# Patient Record
Sex: FEMALE | Race: WHITE | Employment: OTHER | URBAN - METROPOLITAN AREA
[De-identification: names, ages, dates, MRNs, and addresses within clinical notes are randomized per-mention and may not be internally consistent; named-entity substitution may affect disease eponyms.]

---

## 2017-12-14 ENCOUNTER — OFFICE VISIT (OUTPATIENT)
Dept: FAMILY MEDICINE CLINIC | Age: 72
End: 2017-12-14

## 2017-12-14 VITALS
HEART RATE: 84 BPM | DIASTOLIC BLOOD PRESSURE: 80 MMHG | HEIGHT: 62 IN | BODY MASS INDEX: 18.03 KG/M2 | RESPIRATION RATE: 18 BRPM | SYSTOLIC BLOOD PRESSURE: 120 MMHG | WEIGHT: 98 LBS

## 2017-12-14 DIAGNOSIS — I10 ESSENTIAL HYPERTENSION: Primary | ICD-10-CM

## 2017-12-14 DIAGNOSIS — R05.9 COUGH: ICD-10-CM

## 2017-12-14 DIAGNOSIS — F51.01 PRIMARY INSOMNIA: ICD-10-CM

## 2017-12-14 DIAGNOSIS — F41.9 ANXIETY: ICD-10-CM

## 2017-12-14 DIAGNOSIS — E78.2 MIXED HYPERLIPIDEMIA: ICD-10-CM

## 2017-12-14 PROCEDURE — 99203 OFFICE O/P NEW LOW 30 MIN: CPT | Performed by: FAMILY MEDICINE

## 2017-12-14 RX ORDER — PRAVASTATIN SODIUM 20 MG
20 TABLET ORAL DAILY
COMMUNITY
End: 2017-12-14 | Stop reason: SDUPTHER

## 2017-12-14 RX ORDER — ATENOLOL 50 MG/1
50 TABLET ORAL DAILY
Qty: 90 TABLET | Refills: 3 | Status: SHIPPED | OUTPATIENT
Start: 2017-12-14 | End: 2017-12-14

## 2017-12-14 RX ORDER — ALBUTEROL SULFATE 90 UG/1
2 AEROSOL, METERED RESPIRATORY (INHALATION) EVERY 6 HOURS PRN
Qty: 3 INHALER | Refills: 3 | Status: SHIPPED | OUTPATIENT
Start: 2017-12-14 | End: 2018-01-05 | Stop reason: SDUPTHER

## 2017-12-14 RX ORDER — CLONAZEPAM 1 MG/1
1 TABLET ORAL 2 TIMES DAILY PRN
Qty: 45 TABLET | Refills: 2 | Status: SHIPPED | OUTPATIENT
Start: 2017-12-14 | End: 2018-01-05 | Stop reason: SDUPTHER

## 2017-12-14 RX ORDER — TRAZODONE HYDROCHLORIDE 50 MG/1
50 TABLET ORAL NIGHTLY
COMMUNITY
End: 2017-12-14 | Stop reason: SDUPTHER

## 2017-12-14 RX ORDER — MONTELUKAST SODIUM 10 MG/1
10 TABLET ORAL NIGHTLY
Qty: 90 TABLET | Refills: 3 | Status: SHIPPED | OUTPATIENT
Start: 2017-12-14 | End: 2018-01-05 | Stop reason: SDUPTHER

## 2017-12-14 RX ORDER — PANTOPRAZOLE SODIUM 40 MG/1
40 TABLET, DELAYED RELEASE ORAL DAILY
Qty: 90 TABLET | Refills: 3 | Status: SHIPPED | OUTPATIENT
Start: 2017-12-14 | End: 2018-01-05 | Stop reason: SDUPTHER

## 2017-12-14 RX ORDER — CHOLECALCIFEROL (VITAMIN D3) 125 MCG
5 CAPSULE ORAL NIGHTLY
Qty: 90 TABLET | Refills: 1 | Status: CANCELLED | OUTPATIENT
Start: 2017-12-14

## 2017-12-14 RX ORDER — CHOLECALCIFEROL (VITAMIN D3) 125 MCG
10 CAPSULE ORAL NIGHTLY
COMMUNITY

## 2017-12-14 RX ORDER — SPIRONOLACTONE 50 MG/1
50 TABLET, FILM COATED ORAL DAILY
Qty: 90 TABLET | Refills: 3 | Status: SHIPPED | OUTPATIENT
Start: 2017-12-14 | End: 2018-01-05 | Stop reason: SDUPTHER

## 2017-12-14 RX ORDER — BUSPIRONE HYDROCHLORIDE 5 MG/1
5 TABLET ORAL DAILY
Qty: 90 TABLET | Refills: 3 | Status: SHIPPED | OUTPATIENT
Start: 2017-12-14 | End: 2018-01-05 | Stop reason: SDUPTHER

## 2017-12-14 RX ORDER — TRAZODONE HYDROCHLORIDE 50 MG/1
50 TABLET ORAL NIGHTLY
Qty: 90 TABLET | Refills: 3 | Status: SHIPPED | OUTPATIENT
Start: 2017-12-14 | End: 2018-01-05 | Stop reason: SDUPTHER

## 2017-12-14 RX ORDER — BUSPIRONE HYDROCHLORIDE 5 MG/1
5 TABLET ORAL DAILY
COMMUNITY
End: 2017-12-14 | Stop reason: SDUPTHER

## 2017-12-14 RX ORDER — PRAVASTATIN SODIUM 20 MG
20 TABLET ORAL DAILY
Qty: 90 TABLET | Refills: 3 | Status: SHIPPED | OUTPATIENT
Start: 2017-12-14 | End: 2018-01-05 | Stop reason: SDUPTHER

## 2017-12-14 ASSESSMENT — ENCOUNTER SYMPTOMS
CONSTIPATION: 0
SINUS PRESSURE: 0
NAUSEA: 0
VOMITING: 0
SHORTNESS OF BREATH: 1
DIARRHEA: 0
SORE THROAT: 0
CHOKING: 0
COUGH: 1
WHEEZING: 0
TROUBLE SWALLOWING: 0
SINUS PAIN: 0
ABDOMINAL DISTENTION: 0
ABDOMINAL PAIN: 0

## 2017-12-14 NOTE — PROGRESS NOTES
tablet Take 1 tablet by mouth daily 90 tablet 3    albuterol sulfate HFA (VENTOLIN HFA) 108 (90 Base) MCG/ACT inhaler Inhale 2 puffs into the lungs every 6 hours as needed for Wheezing 3 Inhaler 3    aspirin 81 MG tablet Take 81 mg by mouth daily. No current facility-administered medications for this visit. Review of Systems   Constitutional: Positive for unexpected weight change. Negative for activity change, appetite change, chills, fatigue and fever. HENT: Negative for congestion, ear pain, postnasal drip, sinus pain, sinus pressure, sore throat and trouble swallowing. Respiratory: Positive for cough and shortness of breath. Negative for choking and wheezing. Cardiovascular: Negative for chest pain and leg swelling. Gastrointestinal: Negative for abdominal distention, abdominal pain, constipation, diarrhea, nausea and vomiting. Genitourinary: Positive for dyspareunia. Negative for dysuria, flank pain and hematuria. /80 (Site: Right Arm, Position: Sitting, Cuff Size: Medium Adult)   Pulse 84   Resp 18   Ht 5' 1.81\" (1.57 m)   Wt 98 lb (44.5 kg)   Breastfeeding? No   BMI 18.03 kg/m²     Wt Readings from Last 3 Encounters:   12/14/17 98 lb (44.5 kg)   09/05/13 97 lb (44 kg)       Physical Exam   Constitutional: She is oriented to person, place, and time. She appears well-developed and well-nourished. No distress. HENT:   Head: Normocephalic and atraumatic. Right Ear: External ear normal.   Left Ear: External ear normal.   Nose: Nose normal.   Mouth/Throat: Oropharynx is clear and moist. No oropharyngeal exudate. Eyes: Conjunctivae are normal. Pupils are equal, round, and reactive to light. No scleral icterus. Neck: Normal range of motion. Neck supple. No JVD present. No thyromegaly present. Cardiovascular: Normal rate, regular rhythm and normal heart sounds. Exam reveals no gallop and no friction rub. No murmur heard.   Pulmonary/Chest: Effort normal and

## 2017-12-20 ENCOUNTER — HOSPITAL ENCOUNTER (OUTPATIENT)
Dept: CT IMAGING | Age: 72
Discharge: HOME | End: 2017-12-20
Attending: FAMILY MEDICINE | Admitting: FAMILY MEDICINE

## 2017-12-20 DIAGNOSIS — R05.9 COUGH: ICD-10-CM

## 2017-12-27 ENCOUNTER — TELEPHONE (OUTPATIENT)
Dept: FAMILY MEDICINE CLINIC | Age: 72
End: 2017-12-27

## 2017-12-27 DIAGNOSIS — R91.1 LUNG NODULE: Primary | ICD-10-CM

## 2017-12-29 PROBLEM — R91.1 LUNG NODULE: Status: ACTIVE | Noted: 2017-12-29

## 2018-01-05 ENCOUNTER — INITIAL CONSULT (OUTPATIENT)
Dept: PULMONOLOGY | Age: 73
End: 2018-01-05

## 2018-01-05 VITALS
OXYGEN SATURATION: 96 % | HEART RATE: 97 BPM | HEIGHT: 63 IN | SYSTOLIC BLOOD PRESSURE: 118 MMHG | BODY MASS INDEX: 17.36 KG/M2 | WEIGHT: 98 LBS | DIASTOLIC BLOOD PRESSURE: 64 MMHG | RESPIRATION RATE: 16 BRPM

## 2018-01-05 DIAGNOSIS — R91.1 LUNG NODULE: ICD-10-CM

## 2018-01-05 DIAGNOSIS — E78.2 MIXED HYPERLIPIDEMIA: ICD-10-CM

## 2018-01-05 DIAGNOSIS — Z87.891 FORMER SMOKER: ICD-10-CM

## 2018-01-05 DIAGNOSIS — I10 ESSENTIAL HYPERTENSION: ICD-10-CM

## 2018-01-05 DIAGNOSIS — F41.9 ANXIETY: ICD-10-CM

## 2018-01-05 DIAGNOSIS — J44.9 MODERATE COPD (CHRONIC OBSTRUCTIVE PULMONARY DISEASE) (HCC): Primary | ICD-10-CM

## 2018-01-05 DIAGNOSIS — J43.8 OTHER EMPHYSEMA (HCC): ICD-10-CM

## 2018-01-05 DIAGNOSIS — F51.01 PRIMARY INSOMNIA: ICD-10-CM

## 2018-01-05 LAB
DLCO %PRED: NORMAL
DLCO PRE: NORMAL
FEF 25-75%-POST: 0.42
FEF 25-75%-PRE: 0.46
FEV1-POST: 0.92
FEV1-PRE: 1.03
FEV1/FVC-POST: 57.6
FEV1/FVC-PRE: 59
FVC-POST: 1.6
FVC-PRE: 1.75
MEP: NORMAL
MIP: NORMAL
TLC %PRED: NORMAL
TLC PRE: NORMAL

## 2018-01-05 PROCEDURE — 99204 OFFICE O/P NEW MOD 45 MIN: CPT | Performed by: INTERNAL MEDICINE

## 2018-01-05 RX ORDER — TRAZODONE HYDROCHLORIDE 50 MG/1
50 TABLET ORAL NIGHTLY
Qty: 90 TABLET | Refills: 3 | Status: SHIPPED | OUTPATIENT
Start: 2018-01-05 | End: 2018-05-21 | Stop reason: SDUPTHER

## 2018-01-05 RX ORDER — PANTOPRAZOLE SODIUM 40 MG/1
40 TABLET, DELAYED RELEASE ORAL DAILY
Qty: 90 TABLET | Refills: 3 | Status: SHIPPED | OUTPATIENT
Start: 2018-01-05 | End: 2019-01-21 | Stop reason: SDUPTHER

## 2018-01-05 RX ORDER — ALBUTEROL SULFATE 90 UG/1
2 AEROSOL, METERED RESPIRATORY (INHALATION) EVERY 6 HOURS PRN
Qty: 3 INHALER | Refills: 3 | Status: SHIPPED | OUTPATIENT
Start: 2018-01-05 | End: 2018-11-10 | Stop reason: SDUPTHER

## 2018-01-05 RX ORDER — SPIRONOLACTONE 50 MG/1
50 TABLET, FILM COATED ORAL DAILY
Qty: 90 TABLET | Refills: 3 | Status: SHIPPED | OUTPATIENT
Start: 2018-01-05 | End: 2018-06-18

## 2018-01-05 RX ORDER — CLONAZEPAM 1 MG/1
1 TABLET ORAL 2 TIMES DAILY PRN
Qty: 45 TABLET | Refills: 2 | Status: SHIPPED | OUTPATIENT
Start: 2018-01-05 | End: 2018-04-11 | Stop reason: SDUPTHER

## 2018-01-05 RX ORDER — PRAVASTATIN SODIUM 20 MG
20 TABLET ORAL DAILY
Qty: 90 TABLET | Refills: 3 | Status: SHIPPED | OUTPATIENT
Start: 2018-01-05 | End: 2018-06-18

## 2018-01-05 RX ORDER — MONTELUKAST SODIUM 10 MG/1
10 TABLET ORAL NIGHTLY
Qty: 90 TABLET | Refills: 3 | Status: SHIPPED | OUTPATIENT
Start: 2018-01-05 | End: 2019-04-24 | Stop reason: SDUPTHER

## 2018-01-05 RX ORDER — BUSPIRONE HYDROCHLORIDE 5 MG/1
5 TABLET ORAL DAILY
Qty: 90 TABLET | Refills: 3 | Status: SHIPPED | OUTPATIENT
Start: 2018-01-05 | End: 2018-06-18

## 2018-01-05 ASSESSMENT — PULMONARY FUNCTION TESTS
FEV1/FVC_POST: 57.6
FEV1/FVC_PRE: 59.0
FVC_PRE: 1.75
FEV1_POST: 0.92
FVC_POST: 1.60
FEV1_PRE: 1.03

## 2018-01-05 NOTE — PROGRESS NOTES
Subjective:   CHIEF COMPLAINT / HPI: Henrry Cabrales is a 50-year-old female referred here for evaluation of her COPD and the finding of a right middle lobe lung nodule. She states that since adolescence she's had frequent episodes of bronchitis. It is hard to quantitate her smoking exposure but states that she has smoked on and off most of her adult life and stopped completely smoking 4 months ago. She does not complain of exertional dyspnea and has no difficulty climbing stairs and doing her daily activities. She denies hemoptysis or chest pain. She is not aware of a family history of chronic lung disease. She does carry history of hypertension and anxiety. She denies a past history of chronic heart disease or peripheral vascular disease   She states that she does have an albuterol rescue inhaler but uses it infrequently    Past Medical History:  Past Medical History:   Diagnosis Date    Former smoker 1/5/2018    Hypertension     Moderate COPD (chronic obstructive pulmonary disease) ([facility] [address] 1/5/2018    Other emphysema ([facility] 1/5/2018       Current Medications:    No current facility-administered medications for this visit. No Known Allergies    Social History:    Social History     Social History    Marital status:      Spouse name: N/A    Number of children: N/A    Years of education: N/A     Social History Main Topics    Smoking status: Current Every Day Smoker     Packs/day: 0.25     Types: Cigarettes    Smokeless tobacco: Never Used      Comment: Off and on smoker pt states.  Alcohol use No    Drug use: No    Sexual activity: Not Asked     Other Topics Concern    None     Social History Narrative    None       Family History:    History reviewed. No pertinent family history.       REVIEW OF SYSTEMS:    CONSTITUTIONAL:  negative for fevers, chills, diaphoresis, activity change, appetite change, night sweats    HEENT:  negative for hearing loss,  sinus pressure, nasal congestion, epistaxis and snoring  RESPIRATORY:  See HPI  CARDIOVASCULAR:  Negative for chest pain, palpitations, exertional chest pressure/discomfort, edema, syncope  GASTROINTESTINAL: negative for nausea, vomiting, diarrhea, constipation, blood in stool and abdominal pain  GENITOURINARY:  negative for frequency, dysuria and hematuria  HEMATOLOGIC/LYMPHATIC:  negative for easy bruising, bleeding and lymphadenopathy  ALLERGIC/IMMUNOLOGIC:  negative for recurrent infections, angioedema, anaphylaxis and drug reaction  MUSCULOSKELETAL:  negative for  pain, joint swelling, decreased range of motion and muscle weakness    Objective:   PHYSICAL EXAM:      VITALS:    Vitals:    01/05/18 1042   BP: 118/64   Pulse: 97   Resp: 16   SpO2: 96%   Weight: 98 lb (44.5 kg)   Height: 5' 3\" (1.6 m)         CONSTITUTIONAL:  awake, alert, cooperative, no apparent distress, and appears stated age  NECK:  Supple and nontender,  trachea midline, no adenopathy, thyroid nl, no JVD, no wheezing or stridor over neck  CHEST: Chest expansion equal and symmetrical, no intercostal retraction, no increased work of breathing  LUNGS: Breath sounds are mildly diminished throughout all lung areas without wheezing or rhonchi and there is no pleural rubs   CARDIOVASCULAR: Normal S1 and S2 , no murmurs or gallops ,no pericardial rubs  ABDOMEN:  normal bowel sounds, non-distended and no masses palpated, and no tenderness to palpation. No hepatospleenomegaly  LYMPHADENOPATHY:  no axillary or supraclavicular adenopathy. No cervical adnenopathy  RIGHT AND LEFT LOWER EXTREMITIES: No edema, no inflammation, no tenderness. RADIOLOGY: CT chest 12/21/17       1. Stable 5 mm right middle lobe nodule, which is considered benign given the   lack of change from 2011. 2. Moderate emphysema. PFT: Office spirometry demonstrates a moderate obstructive defect with no significant response to bronchodilators    Assessment:     1.  Moderate COPD (chronic obstructive pulmonary

## 2018-01-11 DIAGNOSIS — J43.8 OTHER EMPHYSEMA (HCC): ICD-10-CM

## 2018-01-11 DIAGNOSIS — J44.9 MODERATE COPD (CHRONIC OBSTRUCTIVE PULMONARY DISEASE) (HCC): ICD-10-CM

## 2018-01-16 ENCOUNTER — OFFICE VISIT (OUTPATIENT)
Dept: FAMILY MEDICINE CLINIC | Age: 73
End: 2018-01-16

## 2018-01-16 VITALS
WEIGHT: 100.2 LBS | BODY MASS INDEX: 17.75 KG/M2 | DIASTOLIC BLOOD PRESSURE: 80 MMHG | OXYGEN SATURATION: 93 % | SYSTOLIC BLOOD PRESSURE: 116 MMHG | HEART RATE: 78 BPM

## 2018-01-16 DIAGNOSIS — Z01.419 ENCOUNTER FOR GYNECOLOGICAL EXAMINATION: Primary | ICD-10-CM

## 2018-01-16 DIAGNOSIS — Z90.710 H/O TOTAL HYSTERECTOMY: ICD-10-CM

## 2018-01-16 DIAGNOSIS — N89.8 VAGINAL DRYNESS: ICD-10-CM

## 2018-01-16 PROCEDURE — 99397 PER PM REEVAL EST PAT 65+ YR: CPT | Performed by: FAMILY MEDICINE

## 2018-01-16 ASSESSMENT — ENCOUNTER SYMPTOMS
SHORTNESS OF BREATH: 1
WHEEZING: 0
SORE THROAT: 0
DIARRHEA: 0
ABDOMINAL PAIN: 0
SINUS PRESSURE: 0
SINUS PAIN: 0
CHOKING: 0
CONSTIPATION: 0
ABDOMINAL DISTENTION: 0
VOMITING: 0
NAUSEA: 0
TROUBLE SWALLOWING: 0
COUGH: 1

## 2018-01-29 ENCOUNTER — TELEPHONE (OUTPATIENT)
Dept: FAMILY MEDICINE CLINIC | Age: 73
End: 2018-01-29

## 2018-02-01 ENCOUNTER — TELEPHONE (OUTPATIENT)
Dept: FAMILY MEDICINE CLINIC | Age: 73
End: 2018-02-01

## 2018-02-05 ENCOUNTER — TELEPHONE (OUTPATIENT)
Dept: FAMILY MEDICINE CLINIC | Age: 73
End: 2018-02-05

## 2018-02-05 DIAGNOSIS — N95.2 VAGINAL ATROPHY: Primary | ICD-10-CM

## 2018-02-05 RX ORDER — ESTRADIOL 0.1 MG/G
0.5 CREAM VAGINAL DAILY PRN
Qty: 1 TUBE | Refills: 1 | Status: SHIPPED | OUTPATIENT
Start: 2018-02-05 | End: 2018-04-22

## 2018-04-11 DIAGNOSIS — F41.9 ANXIETY: ICD-10-CM

## 2018-04-12 RX ORDER — CLONAZEPAM 1 MG/1
1 TABLET ORAL 2 TIMES DAILY PRN
Qty: 60 TABLET | Refills: 1 | Status: SHIPPED | OUTPATIENT
Start: 2018-04-12 | End: 2018-06-18 | Stop reason: SDUPTHER

## 2018-04-18 ENCOUNTER — OFFICE VISIT (OUTPATIENT)
Dept: FAMILY MEDICINE CLINIC | Age: 73
End: 2018-04-18

## 2018-04-18 VITALS
SYSTOLIC BLOOD PRESSURE: 100 MMHG | DIASTOLIC BLOOD PRESSURE: 58 MMHG | HEART RATE: 95 BPM | HEIGHT: 63 IN | BODY MASS INDEX: 17.08 KG/M2 | WEIGHT: 96.4 LBS

## 2018-04-18 DIAGNOSIS — E78.2 MIXED HYPERLIPIDEMIA: ICD-10-CM

## 2018-04-18 DIAGNOSIS — F32.A ANXIETY AND DEPRESSION: ICD-10-CM

## 2018-04-18 DIAGNOSIS — I10 ESSENTIAL HYPERTENSION: Primary | ICD-10-CM

## 2018-04-18 DIAGNOSIS — F41.9 ANXIETY AND DEPRESSION: ICD-10-CM

## 2018-04-18 PROCEDURE — G0444 DEPRESSION SCREEN ANNUAL: HCPCS | Performed by: FAMILY MEDICINE

## 2018-04-18 PROCEDURE — 99213 OFFICE O/P EST LOW 20 MIN: CPT | Performed by: FAMILY MEDICINE

## 2018-04-18 ASSESSMENT — PATIENT HEALTH QUESTIONNAIRE - PHQ9
7. TROUBLE CONCENTRATING ON THINGS, SUCH AS READING THE NEWSPAPER OR WATCHING TELEVISION: 0
SUM OF ALL RESPONSES TO PHQ QUESTIONS 1-9: 10
4. FEELING TIRED OR HAVING LITTLE ENERGY: 3
9. THOUGHTS THAT YOU WOULD BE BETTER OFF DEAD, OR OF HURTING YOURSELF: 0
1. LITTLE INTEREST OR PLEASURE IN DOING THINGS: 2
10. IF YOU CHECKED OFF ANY PROBLEMS, HOW DIFFICULT HAVE THESE PROBLEMS MADE IT FOR YOU TO DO YOUR WORK, TAKE CARE OF THINGS AT HOME, OR GET ALONG WITH OTHER PEOPLE: 1
6. FEELING BAD ABOUT YOURSELF - OR THAT YOU ARE A FAILURE OR HAVE LET YOURSELF OR YOUR FAMILY DOWN: 0
5. POOR APPETITE OR OVEREATING: 3
3. TROUBLE FALLING OR STAYING ASLEEP: 0
2. FEELING DOWN, DEPRESSED OR HOPELESS: 2
SUM OF ALL RESPONSES TO PHQ9 QUESTIONS 1 & 2: 4
8. MOVING OR SPEAKING SO SLOWLY THAT OTHER PEOPLE COULD HAVE NOTICED. OR THE OPPOSITE, BEING SO FIGETY OR RESTLESS THAT YOU HAVE BEEN MOVING AROUND A LOT MORE THAN USUAL: 0

## 2018-04-22 ASSESSMENT — ENCOUNTER SYMPTOMS
COUGH: 0
SHORTNESS OF BREATH: 0

## 2018-05-15 ENCOUNTER — OFFICE VISIT (OUTPATIENT)
Dept: FAMILY MEDICINE CLINIC | Age: 73
End: 2018-05-15

## 2018-05-15 VITALS
DIASTOLIC BLOOD PRESSURE: 72 MMHG | HEIGHT: 63 IN | SYSTOLIC BLOOD PRESSURE: 112 MMHG | HEART RATE: 76 BPM | OXYGEN SATURATION: 93 % | WEIGHT: 93 LBS | BODY MASS INDEX: 16.48 KG/M2

## 2018-05-15 DIAGNOSIS — Z13.31 POSITIVE DEPRESSION SCREENING: ICD-10-CM

## 2018-05-15 DIAGNOSIS — E78.2 MIXED HYPERLIPIDEMIA: ICD-10-CM

## 2018-05-15 DIAGNOSIS — I10 ESSENTIAL HYPERTENSION: Primary | ICD-10-CM

## 2018-05-15 DIAGNOSIS — R25.2 LEG CRAMPING: ICD-10-CM

## 2018-05-15 PROCEDURE — 99213 OFFICE O/P EST LOW 20 MIN: CPT | Performed by: FAMILY MEDICINE

## 2018-05-15 PROCEDURE — G8431 POS CLIN DEPRES SCRN F/U DOC: HCPCS | Performed by: FAMILY MEDICINE

## 2018-05-15 ASSESSMENT — ENCOUNTER SYMPTOMS
SHORTNESS OF BREATH: 0
COUGH: 0

## 2018-05-16 LAB
ALBUMIN SERPL-MCNC: 4.6 G/DL
ALP BLD-CCNC: 75 U/L
ALT SERPL-CCNC: 16 U/L
ANION GAP SERPL CALCULATED.3IONS-SCNC: NORMAL MMOL/L
AST SERPL-CCNC: 23 U/L
BASOPHILS ABSOLUTE: 0.1 /ΜL
BASOPHILS RELATIVE PERCENT: 0.9 %
BILIRUB SERPL-MCNC: 0.6 MG/DL (ref 0.1–1.4)
BUN BLDV-MCNC: 14 MG/DL
CALCIUM SERPL-MCNC: 9.8 MG/DL
CHLORIDE BLD-SCNC: 98 MMOL/L
CHOLESTEROL, TOTAL: 164 MG/DL
CHOLESTEROL/HDL RATIO: NORMAL
CO2: 32 MMOL/L
CREAT SERPL-MCNC: 1 MG/DL
EOSINOPHILS ABSOLUTE: 0.2 /ΜL
EOSINOPHILS RELATIVE PERCENT: 2.3 %
GFR CALCULATED: NORMAL
GLUCOSE BLD-MCNC: 92 MG/DL
HCT VFR BLD CALC: 47.1 % (ref 36–46)
HDLC SERPL-MCNC: 62 MG/DL (ref 35–70)
HEMOGLOBIN: 16 G/DL (ref 12–16)
LDL CHOLESTEROL CALCULATED: 83 MG/DL (ref 0–160)
LYMPHOCYTES ABSOLUTE: 2.7 /ΜL
LYMPHOCYTES RELATIVE PERCENT: 41.1 %
MCH RBC QN AUTO: 31.4 PG
MCHC RBC AUTO-ENTMCNC: 34 G/DL
MCV RBC AUTO: 92.3 FL
MONOCYTES ABSOLUTE: 0.7 /ΜL
MONOCYTES RELATIVE PERCENT: 10.3 %
NEUTROPHILS ABSOLUTE: 3 /ΜL
NEUTROPHILS RELATIVE PERCENT: 45.4 %
PDW BLD-RTO: 14.3 %
PLATELET # BLD: 226 K/ΜL
PMV BLD AUTO: ABNORMAL FL
POTASSIUM SERPL-SCNC: 4.4 MMOL/L
RBC # BLD: 5.1 10^6/ΜL
SODIUM BLD-SCNC: 143 MMOL/L
T4 FREE: 1.44
TOTAL PROTEIN: 6.6
TRIGL SERPL-MCNC: 93 MG/DL
TSH SERPL DL<=0.05 MIU/L-ACNC: 5.33 UIU/ML
VLDLC SERPL CALC-MCNC: 19 MG/DL
WBC # BLD: 6.6 10^3/ML

## 2018-05-21 DIAGNOSIS — F51.01 PRIMARY INSOMNIA: ICD-10-CM

## 2018-05-21 RX ORDER — TRAZODONE HYDROCHLORIDE 50 MG/1
50 TABLET ORAL NIGHTLY
Qty: 90 TABLET | Refills: 3 | Status: SHIPPED | OUTPATIENT
Start: 2018-05-21 | End: 2019-04-24 | Stop reason: SDUPTHER

## 2018-05-31 ENCOUNTER — TELEPHONE (OUTPATIENT)
Dept: FAMILY MEDICINE CLINIC | Age: 73
End: 2018-05-31

## 2018-06-18 ENCOUNTER — OFFICE VISIT (OUTPATIENT)
Dept: FAMILY MEDICINE CLINIC | Age: 73
End: 2018-06-18

## 2018-06-18 VITALS
BODY MASS INDEX: 16.34 KG/M2 | DIASTOLIC BLOOD PRESSURE: 65 MMHG | OXYGEN SATURATION: 91 % | HEIGHT: 63 IN | WEIGHT: 92.2 LBS | SYSTOLIC BLOOD PRESSURE: 110 MMHG | HEART RATE: 75 BPM

## 2018-06-18 DIAGNOSIS — M25.572 ACUTE LEFT ANKLE PAIN: Primary | ICD-10-CM

## 2018-06-18 DIAGNOSIS — R25.2 LEG CRAMPING: ICD-10-CM

## 2018-06-18 DIAGNOSIS — I10 ESSENTIAL HYPERTENSION: ICD-10-CM

## 2018-06-18 DIAGNOSIS — E78.2 MIXED HYPERLIPIDEMIA: ICD-10-CM

## 2018-06-18 DIAGNOSIS — F41.9 ANXIETY: ICD-10-CM

## 2018-06-18 PROCEDURE — 99214 OFFICE O/P EST MOD 30 MIN: CPT | Performed by: FAMILY MEDICINE

## 2018-06-18 RX ORDER — IBUPROFEN 600 MG/1
600 TABLET ORAL 2 TIMES DAILY
Qty: 20 TABLET | Refills: 0 | Status: SHIPPED | OUTPATIENT
Start: 2018-06-18 | End: 2018-07-20 | Stop reason: ALTCHOICE

## 2018-06-18 RX ORDER — CLONAZEPAM 1 MG/1
1 TABLET ORAL 2 TIMES DAILY PRN
Qty: 60 TABLET | Refills: 2 | Status: ON HOLD | OUTPATIENT
Start: 2018-06-18 | End: 2018-10-14 | Stop reason: HOSPADM

## 2018-06-24 ASSESSMENT — ENCOUNTER SYMPTOMS
COUGH: 0
SHORTNESS OF BREATH: 0

## 2018-07-13 PROBLEM — K56.609 BOWEL OBSTRUCTION (HCC): Status: ACTIVE | Noted: 2018-07-13

## 2018-07-16 PROBLEM — K52.9 COLITIS: Status: ACTIVE | Noted: 2018-07-16

## 2018-07-19 PROBLEM — K56.609 SBO (SMALL BOWEL OBSTRUCTION) (HCC): Status: ACTIVE | Noted: 2018-07-19

## 2018-07-20 ENCOUNTER — TELEPHONE (OUTPATIENT)
Dept: PHARMACY | Facility: CLINIC | Age: 73
End: 2018-07-20

## 2018-07-20 DIAGNOSIS — K56.609 SBO (SMALL BOWEL OBSTRUCTION) (HCC): Primary | ICD-10-CM

## 2018-07-20 PROCEDURE — 1111F DSCHRG MED/CURRENT MED MERGE: CPT

## 2018-07-20 NOTE — TELEPHONE ENCOUNTER
CLINICAL PHARMACY NOTE  Post-Discharge Transitions of Care ([facility])    Non-face-to-face services provided:  Assessment and support for treatment adherence and medication management-Medication Reconciliation     Subjective/Objective:  Rianna Sanches is a 75 y.o. female. Patient was discharged from [facility] on 7/19/18 with a diagnosis of SBO. Patient outreach to review discharge medications and provide medication review and management. Spoke with patient    No Known Allergies    Discharge Medications (as per discharging medication list found in AVS): There are NEW medications for you. START taking them after you leave the hospital  Medication Sig Comments    traMADol (ULTRAM) 50 MG tablet Take 1 tablet by mouth every 6 hours as needed for Pain for up to 4 days. . Taking as prescribed    Patient states she has taken \"a few tablets since being home\". These are medications you told us you were taking at home, CONTINUE taking them after you leave the hospital    traZODone (DESYREL) 50 MG tablet Take 1 tablet by mouth nightly Taking as prescribed      pantoprazole (PROTONIX) 40 MG tablet Take 1 tablet by mouth daily Taking as prescribed      montelukast (SINGULAIR) 10 MG tablet Take 1 tablet by mouth nightly Taking as prescribed      albuterol sulfate HFA (VENTOLIN HFA) 108 (90 Base) MCG/ACT inhaler Inhale 2 puffs into the lungs every 6 hours as needed for Wheezing Taking as prescribed    Patient has not used it since being home.  melatonin 5 MG TABS tablet Take 5 mg by mouth nightly Taking as prescribed    Patient takes every night    aspirin 81 MG tablet Take 81 mg by mouth daily. Taking as prescribed      clonazePAM (KLONOPIN) 1 MG tablet Take 1 tablet by mouth 2 times daily as needed for Anxiety for up to 30 days. . Taking as prescribed    Patient usually takes one tablet daily and will take the second one if needed.       Meds marked as reviewed    Removed Ibuprofen from the patient's medication list as she has completed therapy. Meds to Beds:No    Estimated Creatinine Clearance: 58 mL/min (based on SCr of 0.7 mg/dL). Assessment/Plan:  - Medication reconciliation completed. Number of medications reviewed: 8    - Pt is taking medications as directed by discharging physician. Number of discrepancies: 1. Instructions per discharge list provided except per below documentation. Identified medication discrepancies/issues:   · Category 4 (1):  1.  Ibuprofen - therapy completed    - [facility] active medication list updated:  · Medications Added (0):    · Medications Removed (1):  Ibuprofen  · Medications Changed (0):      - Identified Potential Medication Interactions: No clinically significant interactions identified via [employer] Interaction Analysis as category D or higher.    - Renal Dosing: No renal adjustments necessary.    - Follow up appointment date (7 days for more severe illness, 14 days for others):    · Patient reminded of upcoming appointment with PCP on 7/30/18    Thank you,    Aleta Joe, PharmD  [address]  Phone: [phone]      For Pharmacy Admin Tracking Only    TCM Call Made?: Yes  [facility] ([facility] Select Patient?: Yes  Total # of Interventions Recommended: 1 - Updated Order #: 1  Total # Interventions Accepted: 1  Intervention Severity:   - Level 1 Intervention Present?: No   - Level 2 #: 0   - Level 3 #: 0  Outreach Status: Review Complete  Care Coordinator Outreach to Patient?: No  Provider Contacted?: No  Time Spent (min): 15

## 2018-07-30 ENCOUNTER — OFFICE VISIT (OUTPATIENT)
Dept: FAMILY MEDICINE CLINIC | Age: 73
End: 2018-07-30

## 2018-07-30 VITALS
HEART RATE: 84 BPM | DIASTOLIC BLOOD PRESSURE: 78 MMHG | BODY MASS INDEX: 15.62 KG/M2 | WEIGHT: 88.2 LBS | OXYGEN SATURATION: 98 % | SYSTOLIC BLOOD PRESSURE: 112 MMHG

## 2018-07-30 DIAGNOSIS — J44.9 MODERATE COPD (CHRONIC OBSTRUCTIVE PULMONARY DISEASE) (HCC): Primary | ICD-10-CM

## 2018-07-30 DIAGNOSIS — E78.2 MIXED HYPERLIPIDEMIA: ICD-10-CM

## 2018-07-30 DIAGNOSIS — F41.9 ANXIETY: ICD-10-CM

## 2018-07-30 DIAGNOSIS — F51.01 PRIMARY INSOMNIA: ICD-10-CM

## 2018-07-30 DIAGNOSIS — Z09 S/P ABDOMINAL SURGERY, FOLLOW-UP EXAM: ICD-10-CM

## 2018-07-30 PROCEDURE — 99213 OFFICE O/P EST LOW 20 MIN: CPT | Performed by: FAMILY MEDICINE

## 2018-07-30 RX ORDER — TRAMADOL HYDROCHLORIDE 50 MG/1
50 TABLET ORAL EVERY 6 HOURS PRN
COMMUNITY
End: 2019-01-21

## 2018-08-05 ASSESSMENT — ENCOUNTER SYMPTOMS
CONSTIPATION: 0
RECTAL PAIN: 0
ABDOMINAL PAIN: 0
SHORTNESS OF BREATH: 0
DIARRHEA: 0
COUGH: 0

## 2018-09-13 ENCOUNTER — TELEPHONE (OUTPATIENT)
Dept: PHARMACY | Facility: CLINIC | Age: 73
End: 2018-09-13

## 2018-10-12 ENCOUNTER — HOSPITAL ENCOUNTER (OUTPATIENT)
Age: 73
Setting detail: OBSERVATION
Discharge: HOME | End: 2018-10-14
Attending: EMERGENCY MEDICINE | Admitting: INTERNAL MEDICINE
Payer: MEDICARE

## 2018-10-12 ENCOUNTER — APPOINTMENT (OUTPATIENT)
Dept: CT IMAGING | Age: 73
End: 2018-10-12
Payer: MEDICARE

## 2018-10-12 DIAGNOSIS — K56.7 ILEUS (HCC): Primary | ICD-10-CM

## 2018-10-12 PROBLEM — Z72.0 TOBACCO ABUSE: Status: ACTIVE | Noted: 2018-10-12

## 2018-10-12 PROBLEM — K56.0 ADYNAMIC ILEUS (HCC): Status: ACTIVE | Noted: 2018-10-12

## 2018-10-12 LAB
ALBUMIN SERPL-MCNC: 4.1 GM/DL (ref 3.4–5)
ALP BLD-CCNC: 80 IU/L (ref 40–129)
ALT SERPL-CCNC: 19 U/L (ref 10–40)
ANION GAP SERPL CALCULATED.3IONS-SCNC: 12 MMOL/L (ref 4–16)
AST SERPL-CCNC: 24 IU/L (ref 15–37)
BASOPHILS ABSOLUTE: 0.1 K/CU MM
BASOPHILS RELATIVE PERCENT: 1 % (ref 0–1)
BILIRUB SERPL-MCNC: 0.4 MG/DL (ref 0–1)
BUN BLDV-MCNC: 8 MG/DL (ref 6–23)
CALCIUM SERPL-MCNC: 9 MG/DL (ref 8.3–10.6)
CHLORIDE BLD-SCNC: 102 MMOL/L (ref 99–110)
CO2: 28 MMOL/L (ref 21–32)
CREAT SERPL-MCNC: 0.7 MG/DL (ref 0.6–1.1)
DIFFERENTIAL TYPE: ABNORMAL
EOSINOPHILS ABSOLUTE: 0.1 K/CU MM
EOSINOPHILS RELATIVE PERCENT: 2 % (ref 0–3)
GFR AFRICAN AMERICAN: >60 ML/MIN/1.73M2
GFR NON-AFRICAN AMERICAN: >60 ML/MIN/1.73M2
GLUCOSE BLD-MCNC: 92 MG/DL (ref 70–99)
HCT VFR BLD CALC: 47.8 % (ref 37–47)
HEMOGLOBIN: 15.2 GM/DL (ref 12.5–16)
IMMATURE NEUTROPHIL %: 0.3 % (ref 0–0.43)
LIPASE: 21 IU/L (ref 13–60)
LYMPHOCYTES ABSOLUTE: 2.1 K/CU MM
LYMPHOCYTES RELATIVE PERCENT: 34.5 % (ref 24–44)
MCH RBC QN AUTO: 30.6 PG (ref 27–31)
MCHC RBC AUTO-ENTMCNC: 31.8 % (ref 32–36)
MCV RBC AUTO: 96.2 FL (ref 78–100)
MONOCYTES ABSOLUTE: 0.7 K/CU MM
MONOCYTES RELATIVE PERCENT: 10.6 % (ref 0–4)
NUCLEATED RBC %: 0 %
PDW BLD-RTO: 14.2 % (ref 11.7–14.9)
PLATELET # BLD: 239 K/CU MM (ref 140–440)
PMV BLD AUTO: 10.6 FL (ref 7.5–11.1)
POTASSIUM SERPL-SCNC: 3.8 MMOL/L (ref 3.5–5.1)
RBC # BLD: 4.97 M/CU MM (ref 4.2–5.4)
SEGMENTED NEUTROPHILS ABSOLUTE COUNT: 3.2 K/CU MM
SEGMENTED NEUTROPHILS RELATIVE PERCENT: 51.6 % (ref 36–66)
SODIUM BLD-SCNC: 142 MMOL/L (ref 135–145)
TOTAL IMMATURE NEUTOROPHIL: 0.02 K/CU MM
TOTAL NUCLEATED RBC: 0 K/CU MM
TOTAL PROTEIN: 6.6 GM/DL (ref 6.4–8.2)
WBC # BLD: 6.1 K/CU MM (ref 4–10.5)

## 2018-10-12 PROCEDURE — 2580000003 HC RX 258: Performed by: NURSE PRACTITIONER

## 2018-10-12 PROCEDURE — 74177 CT ABD & PELVIS W/CONTRAST: CPT

## 2018-10-12 PROCEDURE — 6370000000 HC RX 637 (ALT 250 FOR IP): Performed by: INTERNAL MEDICINE

## 2018-10-12 PROCEDURE — 99284 EMERGENCY DEPT VISIT MOD MDM: CPT

## 2018-10-12 PROCEDURE — 36415 COLL VENOUS BLD VENIPUNCTURE: CPT

## 2018-10-12 PROCEDURE — 83690 ASSAY OF LIPASE: CPT

## 2018-10-12 PROCEDURE — 6370000000 HC RX 637 (ALT 250 FOR IP): Performed by: NURSE PRACTITIONER

## 2018-10-12 PROCEDURE — 2580000003 HC RX 258: Performed by: EMERGENCY MEDICINE

## 2018-10-12 PROCEDURE — G0378 HOSPITAL OBSERVATION PER HR: HCPCS

## 2018-10-12 PROCEDURE — 81001 URINALYSIS AUTO W/SCOPE: CPT

## 2018-10-12 PROCEDURE — 96376 TX/PRO/DX INJ SAME DRUG ADON: CPT

## 2018-10-12 PROCEDURE — 85025 COMPLETE CBC W/AUTO DIFF WBC: CPT

## 2018-10-12 PROCEDURE — 96374 THER/PROPH/DIAG INJ IV PUSH: CPT

## 2018-10-12 PROCEDURE — 96375 TX/PRO/DX INJ NEW DRUG ADDON: CPT

## 2018-10-12 PROCEDURE — 80053 COMPREHEN METABOLIC PANEL: CPT

## 2018-10-12 PROCEDURE — 6360000002 HC RX W HCPCS: Performed by: EMERGENCY MEDICINE

## 2018-10-12 PROCEDURE — 6360000002 HC RX W HCPCS: Performed by: NURSE PRACTITIONER

## 2018-10-12 PROCEDURE — 6360000004 HC RX CONTRAST MEDICATION: Performed by: EMERGENCY MEDICINE

## 2018-10-12 RX ORDER — NICOTINE 21 MG/24HR
1 PATCH, TRANSDERMAL 24 HOURS TRANSDERMAL DAILY
Status: DISCONTINUED | OUTPATIENT
Start: 2018-10-12 | End: 2018-10-14 | Stop reason: HOSPADM

## 2018-10-12 RX ORDER — CLONAZEPAM 0.5 MG/1
1 TABLET ORAL 2 TIMES DAILY PRN
Status: DISCONTINUED | OUTPATIENT
Start: 2018-10-12 | End: 2018-10-14 | Stop reason: HOSPADM

## 2018-10-12 RX ORDER — TRAZODONE HYDROCHLORIDE 50 MG/1
50 TABLET ORAL NIGHTLY
Status: DISCONTINUED | OUTPATIENT
Start: 2018-10-12 | End: 2018-10-14 | Stop reason: HOSPADM

## 2018-10-12 RX ORDER — MONTELUKAST SODIUM 10 MG/1
10 TABLET ORAL NIGHTLY
Status: DISCONTINUED | OUTPATIENT
Start: 2018-10-12 | End: 2018-10-14 | Stop reason: HOSPADM

## 2018-10-12 RX ORDER — LANOLIN ALCOHOL/MO/W.PET/CERES
6 CREAM (GRAM) TOPICAL NIGHTLY
Status: DISCONTINUED | OUTPATIENT
Start: 2018-10-12 | End: 2018-10-12

## 2018-10-12 RX ORDER — MORPHINE SULFATE 4 MG/ML
2 INJECTION, SOLUTION INTRAMUSCULAR; INTRAVENOUS EVERY 30 MIN PRN
Status: DISCONTINUED | OUTPATIENT
Start: 2018-10-12 | End: 2018-10-14 | Stop reason: HOSPADM

## 2018-10-12 RX ORDER — SODIUM CHLORIDE 0.9 % (FLUSH) 0.9 %
10 SYRINGE (ML) INJECTION EVERY 12 HOURS SCHEDULED
Status: DISCONTINUED | OUTPATIENT
Start: 2018-10-12 | End: 2018-10-14 | Stop reason: HOSPADM

## 2018-10-12 RX ORDER — SODIUM CHLORIDE 0.9 % (FLUSH) 0.9 %
10 SYRINGE (ML) INJECTION PRN
Status: DISCONTINUED | OUTPATIENT
Start: 2018-10-12 | End: 2018-10-14 | Stop reason: HOSPADM

## 2018-10-12 RX ORDER — PANTOPRAZOLE SODIUM 40 MG/10ML
40 INJECTION, POWDER, LYOPHILIZED, FOR SOLUTION INTRAVENOUS
Status: DISCONTINUED | OUTPATIENT
Start: 2018-10-13 | End: 2018-10-14 | Stop reason: HOSPADM

## 2018-10-12 RX ORDER — PROMETHAZINE HYDROCHLORIDE 25 MG/ML
6.25 INJECTION, SOLUTION INTRAMUSCULAR; INTRAVENOUS EVERY 6 HOURS PRN
Status: DISCONTINUED | OUTPATIENT
Start: 2018-10-12 | End: 2018-10-14 | Stop reason: HOSPADM

## 2018-10-12 RX ORDER — LANOLIN ALCOHOL/MO/W.PET/CERES
6 CREAM (GRAM) TOPICAL NIGHTLY
Status: DISCONTINUED | OUTPATIENT
Start: 2018-10-12 | End: 2018-10-14 | Stop reason: HOSPADM

## 2018-10-12 RX ORDER — ONDANSETRON 2 MG/ML
4 INJECTION INTRAMUSCULAR; INTRAVENOUS EVERY 30 MIN PRN
Status: DISCONTINUED | OUTPATIENT
Start: 2018-10-12 | End: 2018-10-14 | Stop reason: HOSPADM

## 2018-10-12 RX ORDER — ONDANSETRON 2 MG/ML
4 INJECTION INTRAMUSCULAR; INTRAVENOUS EVERY 6 HOURS PRN
Status: DISCONTINUED | OUTPATIENT
Start: 2018-10-12 | End: 2018-10-14 | Stop reason: HOSPADM

## 2018-10-12 RX ORDER — ASPIRIN 81 MG/1
81 TABLET, CHEWABLE ORAL DAILY
Status: DISCONTINUED | OUTPATIENT
Start: 2018-10-12 | End: 2018-10-14 | Stop reason: HOSPADM

## 2018-10-12 RX ORDER — MORPHINE SULFATE 4 MG/ML
1 INJECTION, SOLUTION INTRAMUSCULAR; INTRAVENOUS
Status: DISCONTINUED | OUTPATIENT
Start: 2018-10-12 | End: 2018-10-14 | Stop reason: HOSPADM

## 2018-10-12 RX ORDER — ALBUTEROL SULFATE 90 UG/1
2 AEROSOL, METERED RESPIRATORY (INHALATION) EVERY 6 HOURS PRN
Status: DISCONTINUED | OUTPATIENT
Start: 2018-10-12 | End: 2018-10-14 | Stop reason: HOSPADM

## 2018-10-12 RX ORDER — SODIUM CHLORIDE 9 MG/ML
INJECTION, SOLUTION INTRAVENOUS CONTINUOUS
Status: DISCONTINUED | OUTPATIENT
Start: 2018-10-12 | End: 2018-10-14 | Stop reason: HOSPADM

## 2018-10-12 RX ORDER — MORPHINE SULFATE 4 MG/ML
2 INJECTION, SOLUTION INTRAMUSCULAR; INTRAVENOUS
Status: DISCONTINUED | OUTPATIENT
Start: 2018-10-12 | End: 2018-10-14 | Stop reason: HOSPADM

## 2018-10-12 RX ORDER — TRAMADOL HYDROCHLORIDE 50 MG/1
50 TABLET ORAL EVERY 6 HOURS PRN
Status: DISCONTINUED | OUTPATIENT
Start: 2018-10-12 | End: 2018-10-14 | Stop reason: HOSPADM

## 2018-10-12 RX ADMIN — MORPHINE SULFATE 1 MG: 4 INJECTION INTRAVENOUS at 23:11

## 2018-10-12 RX ADMIN — MORPHINE SULFATE 2 MG: 4 INJECTION INTRAVENOUS at 15:46

## 2018-10-12 RX ADMIN — SODIUM CHLORIDE, PRESERVATIVE FREE 10 ML: 5 INJECTION INTRAVENOUS at 18:03

## 2018-10-12 RX ADMIN — MORPHINE SULFATE 2 MG: 4 INJECTION INTRAVENOUS at 17:13

## 2018-10-12 RX ADMIN — IOPAMIDOL 75 ML: 755 INJECTION, SOLUTION INTRAVENOUS at 18:03

## 2018-10-12 RX ADMIN — SODIUM CHLORIDE: 9 INJECTION, SOLUTION INTRAVENOUS at 20:27

## 2018-10-12 RX ADMIN — ONDANSETRON 4 MG: 2 INJECTION, SOLUTION INTRAMUSCULAR; INTRAVENOUS at 15:46

## 2018-10-12 ASSESSMENT — PAIN DESCRIPTION - PAIN TYPE
TYPE: ACUTE PAIN
TYPE: ACUTE PAIN

## 2018-10-12 ASSESSMENT — PAIN DESCRIPTION - ONSET: ONSET: PROGRESSIVE

## 2018-10-12 ASSESSMENT — PAIN SCALES - GENERAL
PAINLEVEL_OUTOF10: 9
PAINLEVEL_OUTOF10: 7

## 2018-10-12 ASSESSMENT — PAIN DESCRIPTION - LOCATION
LOCATION: ABDOMEN
LOCATION: ABDOMEN

## 2018-10-12 ASSESSMENT — PAIN DESCRIPTION - FREQUENCY: FREQUENCY: CONTINUOUS

## 2018-10-12 ASSESSMENT — PAIN DESCRIPTION - DESCRIPTORS: DESCRIPTORS: CRAMPING

## 2018-10-12 ASSESSMENT — PAIN DESCRIPTION - ORIENTATION: ORIENTATION: LOWER

## 2018-10-12 ASSESSMENT — PAIN DESCRIPTION - PROGRESSION: CLINICAL_PROGRESSION: GRADUALLY WORSENING

## 2018-10-12 NOTE — ED PROVIDER NOTES
Immature Neutrophil 0.02 K/CU MM    Immature Neutrophil % 0.3 0 - 0.43 %   Lipase   Result Value Ref Range    Lipase 21 13 - 60 IU/L      Radiographs (if obtained):  [] The following radiograph was interpreted by myself in the absence of a radiologist:   [] Radiologist's Report Reviewed:  CT ABDOMEN PELVIS W IV CONTRAST Additional Contrast? None   Final Result   1. Nonspecific borderline dilated small bowel loops left side of the abdomen   and pelvis without clear CT evidence of obstruction. Local mild adynamic   ileus is a consideration versus transient dilatation related to peristalsis. 2.  Calcific atherosclerotic disease aorta. 3. Small volume pelvic ascites is presumably reactive. 4.  Mild to moderate intra and extrahepatic bile duct dilatation status post   cholecystectomy typical of reservoir effect. EKG (if obtained): (All EKG's are interpreted by myself in the absence of a cardiologist)    Chart review shows recent radiographs:  No results found. MDM:  Patient had an IV placed, was given fluids and medications labs sent, results not emergent, CT abdomen and pelvis has some dilated loops of small bowel otherwise no definite findings of a bowel obstruction. Patient continuing to have abdominal pain, given her pain, CT findings, previous bowel obstruction there is concern that she could be developing another one, I do believe she would benefit from observation, repeat abdominal exams, IV fluids and nothing by mouth status for bowel rest, hospitalist notified    Clinical Impression:  1. Ileus ([address]      Disposition referral (if applicable):  No follow-up provider specified.   Disposition medications (if applicable):  New Prescriptions    No medications on file       Comment: Please note this report has been produced using speech recognition software and may contain errors related to that system including errors in grammar, punctuation, and spelling, as well as words and phrases that may

## 2018-10-13 PROBLEM — E43 SEVERE MALNUTRITION (HCC): Chronic | Status: ACTIVE | Noted: 2018-10-13

## 2018-10-13 PROBLEM — R93.5 ABNORMAL CT OF THE ABDOMEN: Status: ACTIVE | Noted: 2018-10-13

## 2018-10-13 PROBLEM — R10.30 LOWER ABDOMINAL PAIN: Status: ACTIVE | Noted: 2018-10-13

## 2018-10-13 PROBLEM — R11.0 NAUSEA: Status: ACTIVE | Noted: 2018-10-13

## 2018-10-13 LAB
BACTERIA: NEGATIVE /HPF
BASOPHILS ABSOLUTE: 0 K/CU MM
BASOPHILS RELATIVE PERCENT: 0.8 % (ref 0–1)
BILIRUBIN URINE: NEGATIVE MG/DL
BLOOD, URINE: NEGATIVE
CLARITY: CLEAR
COLOR: YELLOW
DIFFERENTIAL TYPE: ABNORMAL
EOSINOPHILS ABSOLUTE: 0.1 K/CU MM
EOSINOPHILS RELATIVE PERCENT: 2.8 % (ref 0–3)
GLUCOSE, URINE: NEGATIVE MG/DL
HCT VFR BLD CALC: 41 % (ref 37–47)
HEMOGLOBIN: 12.9 GM/DL (ref 12.5–16)
IMMATURE NEUTROPHIL %: 0.2 % (ref 0–0.43)
KETONES, URINE: ABNORMAL MG/DL
LEUKOCYTE ESTERASE, URINE: NEGATIVE
LYMPHOCYTES ABSOLUTE: 1.5 K/CU MM
LYMPHOCYTES RELATIVE PERCENT: 29.1 % (ref 24–44)
MCH RBC QN AUTO: 30.6 PG (ref 27–31)
MCHC RBC AUTO-ENTMCNC: 31.5 % (ref 32–36)
MCV RBC AUTO: 97.2 FL (ref 78–100)
MONOCYTES ABSOLUTE: 0.6 K/CU MM
MONOCYTES RELATIVE PERCENT: 11 % (ref 0–4)
NITRITE URINE, QUANTITATIVE: NEGATIVE
NUCLEATED RBC %: 0 %
PDW BLD-RTO: 14.2 % (ref 11.7–14.9)
PH, URINE: 6 (ref 5–8)
PLATELET # BLD: 181 K/CU MM (ref 140–440)
PMV BLD AUTO: 10.7 FL (ref 7.5–11.1)
PROTEIN UA: NEGATIVE MG/DL
RBC # BLD: 4.22 M/CU MM (ref 4.2–5.4)
RBC URINE: 1 /HPF (ref 0–6)
SEGMENTED NEUTROPHILS ABSOLUTE COUNT: 2.8 K/CU MM
SEGMENTED NEUTROPHILS RELATIVE PERCENT: 56.1 % (ref 36–66)
SPECIFIC GRAVITY UA: >1.06 (ref 1–1.03)
SQUAMOUS EPITHELIAL: <1 /HPF
TOTAL IMMATURE NEUTOROPHIL: 0.01 K/CU MM
TOTAL NUCLEATED RBC: 0 K/CU MM
TRICHOMONAS: ABNORMAL /HPF
UROBILINOGEN, URINE: NORMAL MG/DL (ref 0.2–1)
WBC # BLD: 5 K/CU MM (ref 4–10.5)
WBC UA: ABNORMAL /HPF (ref 0–5)

## 2018-10-13 PROCEDURE — 6360000002 HC RX W HCPCS: Performed by: NURSE PRACTITIONER

## 2018-10-13 PROCEDURE — 96375 TX/PRO/DX INJ NEW DRUG ADDON: CPT

## 2018-10-13 PROCEDURE — 2700000000 HC OXYGEN THERAPY PER DAY

## 2018-10-13 PROCEDURE — 99222 1ST HOSP IP/OBS MODERATE 55: CPT | Performed by: INTERNAL MEDICINE

## 2018-10-13 PROCEDURE — 99221 1ST HOSP IP/OBS SF/LOW 40: CPT | Performed by: SURGERY

## 2018-10-13 PROCEDURE — 85025 COMPLETE CBC W/AUTO DIFF WBC: CPT

## 2018-10-13 PROCEDURE — 94761 N-INVAS EAR/PLS OXIMETRY MLT: CPT

## 2018-10-13 PROCEDURE — G0378 HOSPITAL OBSERVATION PER HR: HCPCS

## 2018-10-13 PROCEDURE — 6370000000 HC RX 637 (ALT 250 FOR IP): Performed by: NURSE PRACTITIONER

## 2018-10-13 PROCEDURE — 96372 THER/PROPH/DIAG INJ SC/IM: CPT

## 2018-10-13 PROCEDURE — 36415 COLL VENOUS BLD VENIPUNCTURE: CPT

## 2018-10-13 PROCEDURE — 96376 TX/PRO/DX INJ SAME DRUG ADON: CPT

## 2018-10-13 PROCEDURE — C9113 INJ PANTOPRAZOLE SODIUM, VIA: HCPCS | Performed by: NURSE PRACTITIONER

## 2018-10-13 PROCEDURE — 6370000000 HC RX 637 (ALT 250 FOR IP): Performed by: INTERNAL MEDICINE

## 2018-10-13 PROCEDURE — 2580000003 HC RX 258: Performed by: NURSE PRACTITIONER

## 2018-10-13 RX ORDER — POLYETHYLENE GLYCOL 3350 17 G/17G
17 POWDER, FOR SOLUTION ORAL DAILY
Status: DISCONTINUED | OUTPATIENT
Start: 2018-10-13 | End: 2018-10-14 | Stop reason: HOSPADM

## 2018-10-13 RX ADMIN — TRAZODONE HYDROCHLORIDE 50 MG: 50 TABLET ORAL at 20:57

## 2018-10-13 RX ADMIN — MONTELUKAST SODIUM 10 MG: 10 TABLET, COATED ORAL at 20:56

## 2018-10-13 RX ADMIN — MELATONIN TAB 3 MG 6 MG: 3 TAB at 20:56

## 2018-10-13 RX ADMIN — PANTOPRAZOLE SODIUM 40 MG: 40 INJECTION, POWDER, FOR SOLUTION INTRAVENOUS at 06:26

## 2018-10-13 RX ADMIN — ASPIRIN 81 MG CHEWABLE TABLET 81 MG: 81 TABLET CHEWABLE at 09:58

## 2018-10-13 RX ADMIN — SODIUM CHLORIDE: 9 INJECTION, SOLUTION INTRAVENOUS at 06:01

## 2018-10-13 RX ADMIN — ENOXAPARIN SODIUM 30 MG: 30 INJECTION SUBCUTANEOUS at 09:59

## 2018-10-13 RX ADMIN — MORPHINE SULFATE 2 MG: 4 INJECTION INTRAVENOUS at 04:42

## 2018-10-13 RX ADMIN — POLYETHYLENE GLYCOL (3350) 17 G: 17 POWDER, FOR SOLUTION ORAL at 09:59

## 2018-10-13 ASSESSMENT — PAIN SCALES - GENERAL
PAINLEVEL_OUTOF10: 0
PAINLEVEL_OUTOF10: 0
PAINLEVEL_OUTOF10: 8
PAINLEVEL_OUTOF10: 0

## 2018-10-13 ASSESSMENT — PAIN DESCRIPTION - DESCRIPTORS: DESCRIPTORS: CRAMPING

## 2018-10-13 ASSESSMENT — PAIN DESCRIPTION - ORIENTATION: ORIENTATION: LOWER;MID

## 2018-10-13 ASSESSMENT — PAIN DESCRIPTION - FREQUENCY: FREQUENCY: CONTINUOUS

## 2018-10-13 ASSESSMENT — PAIN DESCRIPTION - LOCATION: LOCATION: ABDOMEN

## 2018-10-13 ASSESSMENT — PAIN DESCRIPTION - PAIN TYPE: TYPE: ACUTE PAIN

## 2018-10-13 NOTE — PROGRESS NOTES
Progress Note (Hospitalist, Internal Medicine)  IDENTIFYING INFORMATION   PATIENT:  Bianca Payne  MRN:  [id number]  ADMIT DATE: 10/12/2018  TIME OF EVALUATION: 10/13/2018 1:07 PM      HISTORY OF PRESENT ILLNESS   Bianca Payne is a 75 y.o. female who presents to the ED with granddaughter for complaints of lower abdominal pain with nausea and vomiting, onset this morning. No episode of vomiting in ED. Patient has PMH of pSBO, COPD, lung nodule, smoker, hyperlipidemia, hypertension, and anxiety. Patient reports that this morning, she experienced a moderate crampy, sharp, constant lower abdominal pain, no radiation, pain rate about 3-4/10. She denied taking any pain medication. She also report not eating all day yesterday but symptoms started early morning today. She stated that similar symptoms hse experienced when she had partial SBO last July and had a laparoscopic adhesiolysis procedure done by Dr. Frida Galindo here. Pt seen and examined. Patient denies CP, palpitation, fever, chills or diaphoresis. Denies cough, SOB or difficulty breathing. Denies any other symptoms including HA, paresthesias, diarrhea, constipation, changes in bowels, dysuria, hematuria, frequency or urgency, B/L weakness, and recent illness. Granddaughter at bedside.        SUBJECTIVE     Nausea and abdo pain absent this morning. Feeling hungry    MEDICATIONS   Medications Prior to Admission  Prescriptions Prior to Admission: traMADol (ULTRAM) 50 MG tablet, Take 50 mg by mouth every 6 hours as needed for Pain. .  traZODone (DESYREL) 50 MG tablet, Take 1 tablet by mouth nightly  pantoprazole (PROTONIX) 40 MG tablet, Take 1 tablet by mouth daily  montelukast (SINGULAIR) 10 MG tablet, Take 1 tablet by mouth nightly  albuterol sulfate HFA (VENTOLIN HFA) 108 (90 Base) MCG/ACT inhaler, Inhale 2 puffs into the lungs every 6 hours as needed for Wheezing  melatonin 5 MG TABS tablet, Take 5 mg by mouth nightly  aspirin 81 MG tablet, Take 81 mg by mouth

## 2018-10-13 NOTE — CONSULTS
nightly   Yes Historical Provider, MD   aspirin 81 MG tablet Take 81 mg by mouth daily. Yes Historical Provider, MD   clonazePAM (KLONOPIN) 1 MG tablet Take 1 tablet by mouth 2 times daily as needed for Anxiety for up to 30 days. . 6/18/18 7/18/18  Linda Pop MD        [facility] Meds:    Current Facility-Administered Medications   Medication Dose Route Frequency Provider Last Rate Last Dose    polyethylene glycol (GLYCOLAX) packet 17 g  17 g Oral Daily Svitlana Skinner MD        morphine (PF) injection 2 mg  2 mg Intravenous Q30 Min PRN Anthony Goldstein MD   2 mg at 10/12/18 1713    ondansetron (ZOFRAN) injection 4 mg  4 mg Intravenous Q30 Min PRN Anthony Goldstein MD   4 mg at 10/12/18 1546    sodium chloride flush 0.9 % injection 10 mL  10 mL Intravenous PRN Anthony Goldstein MD   10 mL at 10/12/18 1803    sodium chloride flush 0.9 % injection 10 mL  10 mL Intravenous 2 times per day MIREILLE Araya CNP        sodium chloride flush 0.9 % injection 10 mL  10 mL Intravenous PRN MIREILLE Araya CNP        magnesium hydroxide (MILK OF MAGNESIA) 400 MG/5ML suspension 30 mL  30 mL Oral Daily PRN MIREILLE Araya CNP        ondansetron (ZOFRAN) injection 4 mg  4 mg Intravenous Q6H PRN MIREILLE Wang CNP        enoxaparin (LOVENOX) injection 30 mg  30 mg Subcutaneous Daily MIREILLE Wang - CNP        nicotine (NICODERM CQ) 14 MG/24HR 1 patch  1 patch Transdermal Daily Aidee WattMIREILLE shields - KT        pantoprazole (PROTONIX) injection 40 mg  40 mg Intravenous QAM AC Aidee Watt, APRN - CNP   40 mg at 10/13/18 0626    0.9 % sodium chloride infusion   Intravenous Continuous Aidee WattRICHARD shieldsN -  mL/hr at 10/13/18 0601      promethazine (PHENERGAN) injection 6.25 mg  6.25 mg Intravenous Q6H PRN MIREILLE Wang - CNP        albuterol sulfate  (90 Base) MCG/ACT inhaler 2 puff  2 puff Inhalation Q6H PRN MIREILLE Araya CNP        aspirin chewable

## 2018-10-13 NOTE — CONSULTS
abdominal girth  increase, or abdominal distention. The patient denied dysphagia,  odynophagia, heartburn, or regurgitation. The patient denied weight  loss or weight gain. The patient reports that she did have  colonoscopy every five years because of colon polyps. The colonoscopy  was done by a local GI doctor. The patient was due for another  colonoscopy in 2020. In the emergency room, the patient had undergone  a CT scan of abdomen with IV contrast which showed nonspecific  borderline dilated small bowel loops on the left side of the abdomen  and pelvis. There was no clear CT evidence of bowel obstruction. Small volume pelvic ascites was seen. ASSESSMENT AND PLAN:  A 67-year-old  female patient with a  past medical history of emphysema, COPD, history of small bowel  obstruction status post adhesiolysis in 07/2018 presented with acute  onset low mid abdominal cramps which was 7/10 intensity. The patient  was still able to pass gas and have bowel movement. There was no GI  bleeding. After the pain medication, the patient's pain had resolved  while interview of the patient this morning. 1.  Acute onset low abdominal cramps might be secondary to viral  gastroenteritis, postsurgical _____, _____, or even early stage of  bowel obstruction. On the other hand, mild ileus, postsurgical ileus  could not be completely ruled out. I would recommend conservative  treatment of the patient with IV fluids, ambulation, correction of  electrolytes. I would recommend decreased pain medication because it  might make her possible ileus and also early stage bowel obstruction  worsening. 2.  The patient just had a surgery in 07/2018 with small bowel  obstruction and right now the patient did have borderline dilated  small bowel loops. I will ask the surgeon on board. 3.  History of colon polyps. The patient will follow with her local  GI doctor for routine screening colonoscopy in 2020.     Thank you very much for referring this interesting case.         Irma Crigler, MD  D: 10/13/2018 9:00:55       T: 10/13/2018 10:32:38     [email or web address]  Job#: [id number]     Doc#: [id number]    CC:  <>

## 2018-10-13 NOTE — PLAN OF CARE
Problem: Nutrition  Goal: Optimal nutrition therapy  Outcome: Ongoing  Nutrition Problem: Severe malnutrition, in context of social or environmental circumstances  Intervention: Food and/or Nutrient Delivery: Modify current diet, Start ONS  Nutritional Goals: pt will consume greater than 75% of her meals and supplements provided

## 2018-10-13 NOTE — PROGRESS NOTES
Nutrition Assessment    Type and Reason for Visit: Initial, Positive Nutrition Screen    Nutrition Recommendations:   · Please start a full liquid diet as soon as medically possible  · Start standard supplements    Malnutrition Assessment:  · Malnutrition Status: Meets the criteria for severe malnutrition  · Context: Social or environmental circumstances  · Findings of the 6 clinical characteristics of malnutrition (Minimum of 2 out of 6 clinical characteristics is required to make the diagnosis of moderate or severe Protein Calorie Malnutrition based on AND/ASPEN Guidelines):  1. Energy Intake-Less than or equal to 50%, greater than or equal to 3 months    2. Weight Loss-5% loss or greater, in 1 month  3. Fat Loss-Severe subcutaneous fat loss, Orbital  4. Muscle Loss-Severe muscle mass loss, Temples (temporalis muscle)  5. Fluid Accumulation-No significant fluid accumulation, Extremities  6.  Strength-Not measured    Nutrition Diagnosis:   · Problem: Severe malnutrition, in context of social or environmental circumstances  · Etiology: related to Insufficient energy/nutrient consumption     Signs and symptoms:  as evidenced by Severe loss of subcutaneous fat, Severe muscle loss, Weight loss greater than or equal to 5% in 1 month    Nutrition Assessment:  · Subjective Assessment: Pt seen due to a positive screen for weight loss. Pt states that she has lost 5 lbs in the past month. Pt currently weighs 90 lbs and her BMI is 16. She is showing severe muscle and sub q fat loss. I explained that her BMI and muscle/fat wasting was severe malnutrition and the pt seemed surprised. She states that no one has been concerned about her weight in the past. She is willing to drink Ensure here. Will order.  Educated pt on adding more calories to her daily intake  · Wound Type: None  · Current Nutrition Therapies:  · Oral Diet Orders: Clear Liquid   · Oral Diet intake: Unable to assess  · Oral Nutrition Supplement (ONS)

## 2018-10-14 VITALS
RESPIRATION RATE: 16 BRPM | OXYGEN SATURATION: 91 % | BODY MASS INDEX: 15.36 KG/M2 | DIASTOLIC BLOOD PRESSURE: 65 MMHG | HEIGHT: 64 IN | HEART RATE: 67 BPM | SYSTOLIC BLOOD PRESSURE: 137 MMHG | WEIGHT: 90 LBS | TEMPERATURE: 98 F

## 2018-10-14 LAB
ANION GAP SERPL CALCULATED.3IONS-SCNC: 10 MMOL/L (ref 4–16)
BASOPHILS ABSOLUTE: 0 K/CU MM
BASOPHILS RELATIVE PERCENT: 0.8 % (ref 0–1)
BUN BLDV-MCNC: 8 MG/DL (ref 6–23)
CALCIUM SERPL-MCNC: 8 MG/DL (ref 8.3–10.6)
CHLORIDE BLD-SCNC: 107 MMOL/L (ref 99–110)
CO2: 28 MMOL/L (ref 21–32)
CREAT SERPL-MCNC: 0.7 MG/DL (ref 0.6–1.1)
DIFFERENTIAL TYPE: ABNORMAL
EOSINOPHILS ABSOLUTE: 0.1 K/CU MM
EOSINOPHILS RELATIVE PERCENT: 3.4 % (ref 0–3)
GFR AFRICAN AMERICAN: >60 ML/MIN/1.73M2
GFR NON-AFRICAN AMERICAN: >60 ML/MIN/1.73M2
GLUCOSE BLD-MCNC: 72 MG/DL (ref 70–99)
HCT VFR BLD CALC: 37.3 % (ref 37–47)
HEMOGLOBIN: 11.7 GM/DL (ref 12.5–16)
IMMATURE NEUTROPHIL %: 0.3 % (ref 0–0.43)
LACTATE: 0.5 MMOL/L (ref 0.4–2)
LYMPHOCYTES ABSOLUTE: 1.6 K/CU MM
LYMPHOCYTES RELATIVE PERCENT: 39.9 % (ref 24–44)
MCH RBC QN AUTO: 30.2 PG (ref 27–31)
MCHC RBC AUTO-ENTMCNC: 31.4 % (ref 32–36)
MCV RBC AUTO: 96.1 FL (ref 78–100)
MONOCYTES ABSOLUTE: 0.5 K/CU MM
MONOCYTES RELATIVE PERCENT: 12.1 % (ref 0–4)
NUCLEATED RBC %: 0 %
PDW BLD-RTO: 13.9 % (ref 11.7–14.9)
PLATELET # BLD: 177 K/CU MM (ref 140–440)
PMV BLD AUTO: 10.5 FL (ref 7.5–11.1)
POTASSIUM SERPL-SCNC: 3.9 MMOL/L (ref 3.5–5.1)
RBC # BLD: 3.88 M/CU MM (ref 4.2–5.4)
SEGMENTED NEUTROPHILS ABSOLUTE COUNT: 1.7 K/CU MM
SEGMENTED NEUTROPHILS RELATIVE PERCENT: 43.5 % (ref 36–66)
SODIUM BLD-SCNC: 145 MMOL/L (ref 135–145)
TOTAL IMMATURE NEUTOROPHIL: 0.01 K/CU MM
TOTAL NUCLEATED RBC: 0 K/CU MM
WBC # BLD: 3.9 K/CU MM (ref 4–10.5)

## 2018-10-14 PROCEDURE — 36415 COLL VENOUS BLD VENIPUNCTURE: CPT

## 2018-10-14 PROCEDURE — 6370000000 HC RX 637 (ALT 250 FOR IP): Performed by: INTERNAL MEDICINE

## 2018-10-14 PROCEDURE — 94761 N-INVAS EAR/PLS OXIMETRY MLT: CPT

## 2018-10-14 PROCEDURE — 6360000002 HC RX W HCPCS: Performed by: NURSE PRACTITIONER

## 2018-10-14 PROCEDURE — 83605 ASSAY OF LACTIC ACID: CPT

## 2018-10-14 PROCEDURE — G0378 HOSPITAL OBSERVATION PER HR: HCPCS

## 2018-10-14 PROCEDURE — 6370000000 HC RX 637 (ALT 250 FOR IP): Performed by: NURSE PRACTITIONER

## 2018-10-14 PROCEDURE — 96376 TX/PRO/DX INJ SAME DRUG ADON: CPT

## 2018-10-14 PROCEDURE — C9113 INJ PANTOPRAZOLE SODIUM, VIA: HCPCS | Performed by: NURSE PRACTITIONER

## 2018-10-14 PROCEDURE — 99232 SBSQ HOSP IP/OBS MODERATE 35: CPT | Performed by: INTERNAL MEDICINE

## 2018-10-14 PROCEDURE — 80048 BASIC METABOLIC PNL TOTAL CA: CPT

## 2018-10-14 PROCEDURE — 85025 COMPLETE CBC W/AUTO DIFF WBC: CPT

## 2018-10-14 RX ORDER — POLYETHYLENE GLYCOL 3350 17 G/17G
17 POWDER, FOR SOLUTION ORAL DAILY PRN
Qty: 527 G | Refills: 1 | Status: SHIPPED | OUTPATIENT
Start: 2018-10-14 | End: 2018-11-13

## 2018-10-14 RX ADMIN — TRAMADOL HYDROCHLORIDE 50 MG: 50 TABLET, FILM COATED ORAL at 08:34

## 2018-10-14 RX ADMIN — CLONAZEPAM 1 MG: 0.5 TABLET ORAL at 15:53

## 2018-10-14 RX ADMIN — ASPIRIN 81 MG CHEWABLE TABLET 81 MG: 81 TABLET CHEWABLE at 08:34

## 2018-10-14 RX ADMIN — POLYETHYLENE GLYCOL (3350) 17 G: 17 POWDER, FOR SOLUTION ORAL at 08:34

## 2018-10-14 RX ADMIN — PANTOPRAZOLE SODIUM 40 MG: 40 INJECTION, POWDER, FOR SOLUTION INTRAVENOUS at 05:05

## 2018-10-14 ASSESSMENT — PAIN SCALES - GENERAL: PAINLEVEL_OUTOF10: 5

## 2018-10-14 NOTE — PROGRESS NOTES
Reason for Visit: She had concerns including Abdominal Pain.     HPI: She reported that she had been fine and the abdominal pain has completely resolved and she denied N/V, and she had BM today with no GI bleeding       PMH:    Past Medical History:   Diagnosis Date    Former smoker 1/5/2018    Hypertension     Moderate COPD (chronic obstructive pulmonary disease) ([facility] 1/5/2018    Other emphysema ([facility] 1/5/2018       PSH:    Past Surgical History:   Procedure Laterality Date    ABDOMEN SURGERY      CHOLECYSTECTOMY      HYSTERECTOMY      OTHER SURGICAL HISTORY  07/16/2018    exp lap with TUNG       Medications:    Current Facility-Administered Medications   Medication Dose Route Frequency Provider Last Rate Last Dose    polyethylene glycol (GLYCOLAX) packet 17 g  17 g Oral Daily Ranjit Edwards MD   17 g at 10/14/18 0834    morphine (PF) injection 2 mg  2 mg Intravenous Q30 Min PRN Alan Dueñas MD   2 mg at 10/12/18 1713    ondansetron (ZOFRAN) injection 4 mg  4 mg Intravenous Q30 Min PRN Alan Dueñas MD   4 mg at 10/12/18 1546    sodium chloride flush 0.9 % injection 10 mL  10 mL Intravenous PRN Alan Dueñas MD   10 mL at 10/12/18 1803    sodium chloride flush 0.9 % injection 10 mL  10 mL Intravenous 2 times per day [place] Rodolfo APRN - CNP        sodium chloride flush 0.9 % injection 10 mL  10 mL Intravenous PRN [place] Rodolfo APRN - CNP        magnesium hydroxide (MILK OF MAGNESIA) 400 MG/5ML suspension 30 mL  30 mL Oral Daily PRN Aideeeliu Watt, APRN - CNP        ondansetron (ZOFRAN) injection 4 mg  4 mg Intravenous Q6H PRN Aidee Watt, APRN - CNP        enoxaparin (LOVENOX) injection 30 mg  30 mg Subcutaneous Daily Aidee Watt, APRN - CNP   30 mg at 10/13/18 0959    nicotine (NICODERM CQ) 14 MG/24HR 1 patch  1 patch Transdermal Daily Aidee Watt, APRN - CNP        pantoprazole (PROTONIX) injection 40 mg  40 mg Intravenous QAM AC Aidee Watt, APRN - CNP   40 mg at 10/14/18 0505    0.9 % sodium chloride infusion   Intravenous Continuous Yahir Rosario MD 50 mL/hr at 10/14/18 0820      promethazine (PHENERGAN) injection 6.25 mg  6.25 mg Intravenous Q6H PRN Aidee Watt APRN - CNP        albuterol sulfate  (90 Base) MCG/ACT inhaler 2 puff  2 puff Inhalation Q6H PRN Tyesha Solis APRN - CNP        aspirin chewable tablet 81 mg  81 mg Oral Daily Aidee Watt, APRN - CNP   81 mg at 10/14/18 0834    clonazePAM (KLONOPIN) tablet 1 mg  1 mg Oral BID PRN Tyesha Solis, APRN - CNP        montelukast (SINGULAIR) tablet 10 mg  10 mg Oral Nightly Aidee Watt, APRN - CNP   10 mg at 10/13/18 2056    traMADol (ULTRAM) tablet 50 mg  50 mg Oral Q6H PRN Tyesha Solis, APRN - CNP   50 mg at 10/14/18 0834    traZODone (DESYREL) tablet 50 mg  50 mg Oral Nightly Aidee Watt, APRN - CNP   50 mg at 10/13/18 2057    morphine (PF) injection 1 mg  1 mg Intravenous Q2H PRN Tyesha Solis, APRN - CNP   1 mg at 10/12/18 2311    Or    morphine (PF) injection 2 mg  2 mg Intravenous Q2H PRN Tyesha Solis, APRN - CNP   2 mg at 10/13/18 0442    melatonin tablet 6 mg  6 mg Oral Nightly Catherine Bryant MD   6 mg at 10/13/18 2056       Allergies: Allergies   Allergen Reactions    Pravastatin Sodium Other (See Comments)     Myalgias (note from 5/15/18)       Social History:    Social History     Social History    Marital status:      Spouse name: N/A    Number of children: N/A    Years of education: N/A     Occupational History    Not on file. Social History Main Topics    Smoking status: Current Every Day Smoker     Packs/day: 0.50     Years: 50.00     Types: Cigarettes    Smokeless tobacco: Never Used      Comment: Off and on smoker pt states.     Alcohol use No    Drug use: No    Sexual activity: Not on file     Other Topics Concern    Not on file     Social History Narrative    No narrative on file       Family History:    Family History   Problem there was no enlarged thyroid. There was no JVD. Lungs: The respiratory was not in labor and the patient did not use accessory muscle. Clear to auscultation bilaterally, no wheeze/crackles. Cardiovascular: Regular rate and rhythm, normal S1 & S2, no murmurs, rubs or gallops appreciated. Peripheral pulses were normal and no tenderness. Abdomen: Soft, non-tender, no rebound or guarding or peritoneal features, no masses, no hepatosplenomegaly. Extremities: upper and lower extremities were warm and dry, no clubbing, cyanosis, edema. Neuro: CN II-XII were intact grossly. Sensation was normal on all extremities and the muscle strength was normal and symmetry. Rectum: There was no fistular, fissure, external hemorrhoid, tenderness, abscess, erythema or discharge    Labs:  CBC  WBC   Date Value Ref Range Status   10/14/2018 3.9 (L) 4.0 - 10.5 K/CU MM Final     Hemoglobin   Date Value Ref Range Status   10/14/2018 11.7 (L) 12.5 - 16.0 GM/DL Final     Hematocrit   Date Value Ref Range Status   10/14/2018 37.3 37 - 47 % Final     MCV   Date Value Ref Range Status   10/14/2018 96.1 78 - 100 FL Final        Glucose   Date Value Ref Range Status   10/14/2018 72 70 - 99 MG/DL Final     CO2   Date Value Ref Range Status   10/14/2018 28 21 - 32 MMOL/L Final     BUN   Date Value Ref Range Status   10/14/2018 8 6 - 23 MG/DL Final     Lab Results   Component Value Date    ALT 19 10/12/2018    AST 24 10/12/2018     Lab Results   Component Value Date    AMYLASE 53 08/04/2011     Lab Results   Component Value Date    LIPASE 21 10/12/2018     No results found for: ESR  No components found for: CREACTIVEPR  No results found for: EDGARDO No components found for: ANTISMA, ANTIMITO  No results found for: CEA  No components found for: OCCULTBLOOD, OCCBLDSINPOC, OCCULTBLOODS, OCCBLD1, OCCBLD2, OCCBLD3, OCCULTBLOOD  No results found for: IRON, FERRITIN  No results found for: HAV    Assessment     Assessment and Plan:    Abd has resolved.   She

## 2018-10-14 NOTE — CONSULTS
Consults   I will cancel consult  I see patient has resolved her symptoms and is eating.  Discharge written  She can see me as an outpatient as needed

## 2018-10-14 NOTE — DISCHARGE SUMMARY
K/CU MM Final   10/13/2018 06:28 AM 5.0 4.0 - 10.5 K/CU MM Final   10/12/2018 01:55 PM 6.1 4.0 - 10.5 K/CU MM Final     Hemoglobin   Date/Time Value Ref Range Status   10/14/2018 03:42 AM 11.7 (L) 12.5 - 16.0 GM/DL Final   10/13/2018 06:28 AM 12.9 12.5 - 16.0 GM/DL Final   10/12/2018 01:55 PM 15.2 12.5 - 16.0 GM/DL Final     Platelets   Date/Time Value Ref Range Status   10/14/2018 03:42  140 - 440 K/CU MM Final   10/13/2018 06:28  140 - 440 K/CU MM Final   10/12/2018 01:55  140 - 440 K/CU MM Final    CMP:  Recent Labs      10/12/18   1355  10/14/18   0342   NA  142  145   K  3.8  3.9   CL  102  107   CO2  28  28   BUN  8  8   CREATININE  0.7  0.7   CALCIUM  9.0  8.0*   PROT  6.6   --    LABALBU  4.1   --    BILITOT  0.4   --    ALKPHOS  80   --    AST  24   --    ALT  19   --      Troponin: No results for input(s): TROPONINI in the last 72 hours. BNP: No results for input(s): BNP in the last 72 hours. Lipids: No results for input(s): CHOL, HDL in the last 72 hours. Invalid input(s): LDLCALCU  ABGs:No results for input(s): PH, QQG1OWW, PO2ART, BE, DSP8GOX, CO2CT, O2SAT, LABCARB in the last 72 hours. Invalid input(s): METHGBART    Glucose: No results for input(s): POCGLU in the last 72 hours. Magnesium: No results for input(s): MG in the last 72 hours. Phosphorus:No results for input(s): PHOS in the last 72 hours. INR: No results for input(s): INR in the last 72 hours. Patient Instructions:   [facility] Medication Instructions XGA:[id number]    Printed on:10/14/18 0610   Medication Information                      albuterol sulfate HFA (VENTOLIN HFA) 108 (90 Base) MCG/ACT inhaler  Inhale 2 puffs into the lungs every 6 hours as needed for Wheezing             aspirin 81 MG tablet  Take 81 mg by mouth daily.              melatonin 5 MG TABS tablet  Take 5 mg by mouth nightly             montelukast (SINGULAIR) 10 MG tablet  Take 1 tablet by mouth nightly pantoprazole (PROTONIX) 40 MG tablet  Take 1 tablet by mouth daily             polyethylene glycol (MIRALAX) packet  Take 17 g by mouth daily as needed for Constipation (titrate to 1 BM daily)             traMADol (ULTRAM) 50 MG tablet  Take 50 mg by mouth every 6 hours as needed for Pain. .             traZODone (DESYREL) 50 MG tablet  Take 1 tablet by mouth nightly                  Code Status: Full Code     Consults:   IP CONSULT TO HOSPITALIST  IP CONSULT TO GENERAL SURGERY    Diet: soft diet    Activity: activity as tolerated   Work:    Discharged Condition: fair    Prognosis: Fair    Disposition: home      Follow-up with     Follow-up With  Details  Why  Contact Info   Tal Ferguson MD      [address]   Valentin Navarrete MD  Schedule an appointment as soon as possible for a visit  if symptoms recur, please call surgeon - Dr Sharlett Alpers, partner of Dr Maegan Waldrop who will take over your care  [facility] [address] [place] [address]  [phone]            Discharge Physician Signed:   [address] [place] [address], Internal medicine  10/14/2018 at 9:10 AM    The patient was seen and examined on day of discharge and this discharge summary is in conjunction with any daily progress note from day of discharge.   Time spent on discharge in the examination, evaluation, counseling and review of medications and discharge plan: <30 minutes    Please forward this discharge summary to patient's PCP

## 2018-10-15 ENCOUNTER — TELEPHONE (OUTPATIENT)
Dept: PHARMACY | Facility: CLINIC | Age: 73
End: 2018-10-15

## 2018-10-15 DIAGNOSIS — K56.0 ADYNAMIC ILEUS (HCC): Primary | ICD-10-CM

## 2018-10-15 PROCEDURE — 1111F DSCHRG MED/CURRENT MED MERGE: CPT

## 2018-10-15 RX ORDER — CLONAZEPAM 1 MG/1
1 TABLET ORAL 2 TIMES DAILY PRN
COMMUNITY
End: 2018-10-30 | Stop reason: SDUPTHER

## 2018-10-15 NOTE — TELEPHONE ENCOUNTER
Dr. Tal Ferguson MD to review with patient in upcoming telephone/office visit on 10/30/18.   - FYI - Clonazepam was discontinued by discharging physician on 10/14/18, but no reasoning provided in the chart. Patient informed me during medication review that she takes this once daily in the morning and it was only stopped while she was in the hospital. She did receive a dose inpatient on 10/14 in the afternoon. Patient is currently taking at home - please comment if you would like patient to stop taking clonazepam at home. Thank you,    Imtiaz Cuellar, PharmD  [address] [place] [address] Pharmacist  [phone] or [phone] (Option 7)  =======================================================  CLINICAL PHARMACY NOTE  Post-Discharge Transitions of Care ([facility])    Non-face-to-face services provided:  Assessment and support for treatment adherence and medication management-Medication Reconciliation    Subjective/Objective:  Isabelle Power is a 75 y.o. female. Patient was discharged from [facility] on 10/14/18 with a diagnosis of Adynamic ileus. Patient outreach to review discharge medications and provide medication review and management. Spoke with patient    Allergies   Allergen Reactions    Pravastatin Sodium Other (See Comments)     Myalgias (note from 5/15/18)       Discharge Medications (as per discharging medication list found in AVS): There are NEW medications for you. START taking them after you leave the hospital   polyethylene glycol (MIRALAX) packet Take 17 g by mouth daily as needed for Constipation (titrate to 1 BM daily) Taking as prescribed   Patient states she took dose today. These are medications you told us you were taking at home, CONTINUE taking them after you leave the hospital   Medication Sig Comments    clonazePAM (KLONOPIN) 1 MG tablet Take 1 mg by mouth 2 times daily as needed for Anxiety. . Patient states she is taking one daily despite being

## 2018-10-16 ENCOUNTER — TELEPHONE (OUTPATIENT)
Dept: FAMILY MEDICINE CLINIC | Age: 73
End: 2018-10-16

## 2018-10-17 ENCOUNTER — OFFICE VISIT (OUTPATIENT)
Dept: FAMILY MEDICINE CLINIC | Age: 73
End: 2018-10-17
Payer: MEDICARE

## 2018-10-17 VITALS
HEART RATE: 81 BPM | WEIGHT: 90.2 LBS | TEMPERATURE: 98 F | DIASTOLIC BLOOD PRESSURE: 78 MMHG | SYSTOLIC BLOOD PRESSURE: 125 MMHG | BODY MASS INDEX: 15.48 KG/M2 | OXYGEN SATURATION: 96 %

## 2018-10-17 DIAGNOSIS — I10 ESSENTIAL HYPERTENSION: ICD-10-CM

## 2018-10-17 DIAGNOSIS — J20.9 ACUTE BRONCHITIS, UNSPECIFIED ORGANISM: Primary | ICD-10-CM

## 2018-10-17 DIAGNOSIS — Z23 NEEDS FLU SHOT: ICD-10-CM

## 2018-10-17 PROCEDURE — 90662 IIV NO PRSV INCREASED AG IM: CPT | Performed by: FAMILY MEDICINE

## 2018-10-17 PROCEDURE — G0008 ADMIN INFLUENZA VIRUS VAC: HCPCS | Performed by: FAMILY MEDICINE

## 2018-10-17 PROCEDURE — 99213 OFFICE O/P EST LOW 20 MIN: CPT | Performed by: FAMILY MEDICINE

## 2018-10-17 RX ORDER — DOXYCYCLINE HYCLATE 100 MG
100 TABLET ORAL 2 TIMES DAILY
Qty: 20 TABLET | Refills: 0 | Status: SHIPPED | OUTPATIENT
Start: 2018-10-17 | End: 2018-10-27

## 2018-10-17 ASSESSMENT — ENCOUNTER SYMPTOMS
SORE THROAT: 1
WHEEZING: 0
SHORTNESS OF BREATH: 0
COUGH: 1

## 2018-10-17 NOTE — PROGRESS NOTES
10/14/2018    GLUCOSE 72 10/14/2018    CALCIUM 8.0 (L) 10/14/2018    PROT 6.6 10/12/2018    LABALBU 4.1 10/12/2018    BILITOT 0.4 10/12/2018    ALKPHOS 80 10/12/2018    AST 24 10/12/2018    ALT 19 10/12/2018    LABGLOM >60 10/14/2018    GFRAA >60 10/14/2018     Lab Results   Component Value Date    CHOL 164 05/16/2018    CHOL 167 07/10/2011    CHOL 188 05/18/2011     Lab Results   Component Value Date    TRIG 93 05/16/2018    TRIG 74 07/10/2011    TRIG 131 05/18/2011     Lab Results   Component Value Date    HDL 62 05/16/2018    HDL 47 (L) 07/10/2011    HDL 57 (L) 05/18/2011     Lab Results   Component Value Date    LDLCALC 83 05/16/2018     No results found for: LABA1C  Lab Results   Component Value Date    TSH 5.330 05/16/2018    TSHHS 1.330 07/10/2011         /78 (Site: Right Upper Arm, Position: Sitting, Cuff Size: Small Adult)   Pulse 81   Temp 98 °F (36.7 °C)   Wt 90 lb 3.2 oz (40.9 kg)   SpO2 96%   BMI 15.48 kg/m²     BP Readings from Last 3 Encounters:   10/17/18 125/78   10/14/18 137/65   08/02/18 124/80       Wt Readings from Last 3 Encounters:   10/17/18 90 lb 3.2 oz (40.9 kg)   10/14/18 90 lb (40.8 kg)   08/02/18 89 lb 9.6 oz (40.6 kg)         Physical Exam   Constitutional: She appears well-developed and well-nourished. No distress. HENT:   Head: Normocephalic and atraumatic. Right Ear: External ear normal.   Left Ear: External ear normal.   Nose: Nose normal.   Mouth/Throat: Oropharynx is clear and moist.   Eyes: Pupils are equal, round, and reactive to light. EOM are normal. No scleral icterus. Neck: Normal range of motion. Neck supple. No thyromegaly present. Cardiovascular: Normal rate, regular rhythm and normal heart sounds. No murmur heard. Pulmonary/Chest: Effort normal. No respiratory distress. She has wheezes. She has no rales. Abdominal: Soft. There is no tenderness. Musculoskeletal: Normal range of motion. She exhibits no edema.    Lymphadenopathy:     She has no cervical adenopathy. Skin: She is not diaphoretic. Psychiatric: She has a normal mood and affect. Her behavior is normal.       ASSESSMENT/ PLAN:    1. Acute bronchitis, unspecified organism  - patient going to another stat, will give her  The antibiotic just as a back up prescription, will take it if getting worse.  - doxycycline hyclate (VIBRA-TABS) 100 MG tablet; Take 1 tablet by mouth 2 times daily for 10 days  Dispense: 20 tablet; Refill: 0    2. Essential hypertension  - stable    3. Needs flu shot  - INFLUENZA, HIGH DOSE, 65 YRS +, IM, PF, PREFILL SYR, 0.5ML (FLUZONE HD)  - tolerate the shot            - Appropriateprescription are addressed. - After visit summery provided. - Questions answered and patient verbalizes understanding.  - Call for any problem, questions, or concerns.  - RTC if symptoms worse. Return in about 3 months (around 1/17/2019).

## 2018-10-18 ASSESSMENT — ENCOUNTER SYMPTOMS
ABDOMINAL PAIN: 0
VOMITING: 0
SINUS PAIN: 0
NAUSEA: 0
TROUBLE SWALLOWING: 0
CHEST TIGHTNESS: 0
SINUS PRESSURE: 0

## 2018-10-30 ENCOUNTER — OFFICE VISIT (OUTPATIENT)
Dept: FAMILY MEDICINE CLINIC | Age: 73
End: 2018-10-30
Payer: MEDICARE

## 2018-10-30 VITALS
WEIGHT: 88.4 LBS | SYSTOLIC BLOOD PRESSURE: 106 MMHG | TEMPERATURE: 98.2 F | DIASTOLIC BLOOD PRESSURE: 72 MMHG | HEART RATE: 84 BPM | BODY MASS INDEX: 15.17 KG/M2 | OXYGEN SATURATION: 92 %

## 2018-10-30 DIAGNOSIS — J20.9 ACUTE BRONCHITIS, UNSPECIFIED ORGANISM: Primary | ICD-10-CM

## 2018-10-30 DIAGNOSIS — F41.9 ANXIETY: ICD-10-CM

## 2018-10-30 DIAGNOSIS — I10 ESSENTIAL HYPERTENSION: ICD-10-CM

## 2018-10-30 PROCEDURE — 99213 OFFICE O/P EST LOW 20 MIN: CPT | Performed by: FAMILY MEDICINE

## 2018-10-30 RX ORDER — CLONAZEPAM 1 MG/1
1 TABLET ORAL 2 TIMES DAILY PRN
Qty: 60 TABLET | Refills: 2 | Status: SHIPPED | OUTPATIENT
Start: 2018-10-30 | End: 2019-01-21 | Stop reason: SDUPTHER

## 2018-10-30 RX ORDER — LEVOFLOXACIN 750 MG/1
750 TABLET ORAL DAILY
Qty: 5 TABLET | Refills: 0 | Status: SHIPPED | OUTPATIENT
Start: 2018-10-30 | End: 2018-10-30 | Stop reason: SDUPTHER

## 2018-10-30 RX ORDER — METHYLPREDNISOLONE 4 MG/1
TABLET ORAL
Qty: 1 KIT | Refills: 0 | Status: SHIPPED | OUTPATIENT
Start: 2018-10-30 | End: 2019-01-21

## 2018-10-30 RX ORDER — LEVOFLOXACIN 750 MG/1
750 TABLET ORAL DAILY
Qty: 5 TABLET | Refills: 0 | Status: SHIPPED | OUTPATIENT
Start: 2018-10-30 | End: 2018-11-09

## 2018-10-30 RX ORDER — METHYLPREDNISOLONE 4 MG/1
TABLET ORAL
Qty: 1 KIT | Refills: 0 | Status: SHIPPED | OUTPATIENT
Start: 2018-10-30 | End: 2018-10-30 | Stop reason: SDUPTHER

## 2018-11-04 ASSESSMENT — ENCOUNTER SYMPTOMS
TROUBLE SWALLOWING: 0
COUGH: 1
ABDOMINAL PAIN: 0
CHEST TIGHTNESS: 0
SINUS PAIN: 0
SINUS PRESSURE: 0
NAUSEA: 0
WHEEZING: 1
VOMITING: 0
SHORTNESS OF BREATH: 0
SORE THROAT: 1

## 2019-01-21 ENCOUNTER — OFFICE VISIT (OUTPATIENT)
Dept: FAMILY MEDICINE CLINIC | Age: 74
End: 2019-01-21
Payer: MEDICARE

## 2019-01-21 VITALS
OXYGEN SATURATION: 99 % | HEART RATE: 86 BPM | WEIGHT: 92.2 LBS | DIASTOLIC BLOOD PRESSURE: 65 MMHG | SYSTOLIC BLOOD PRESSURE: 105 MMHG | BODY MASS INDEX: 15.83 KG/M2

## 2019-01-21 DIAGNOSIS — J44.9 MODERATE COPD (CHRONIC OBSTRUCTIVE PULMONARY DISEASE) (HCC): ICD-10-CM

## 2019-01-21 DIAGNOSIS — K21.9 GASTROESOPHAGEAL REFLUX DISEASE WITHOUT ESOPHAGITIS: ICD-10-CM

## 2019-01-21 DIAGNOSIS — F51.01 PRIMARY INSOMNIA: ICD-10-CM

## 2019-01-21 DIAGNOSIS — Z23 NEED FOR PNEUMOCOCCAL VACCINATION: ICD-10-CM

## 2019-01-21 DIAGNOSIS — F41.9 ANXIETY: Primary | ICD-10-CM

## 2019-01-21 DIAGNOSIS — R05.9 COUGH: ICD-10-CM

## 2019-01-21 PROCEDURE — G0009 ADMIN PNEUMOCOCCAL VACCINE: HCPCS | Performed by: FAMILY MEDICINE

## 2019-01-21 PROCEDURE — 90732 PPSV23 VACC 2 YRS+ SUBQ/IM: CPT | Performed by: FAMILY MEDICINE

## 2019-01-21 PROCEDURE — 99213 OFFICE O/P EST LOW 20 MIN: CPT | Performed by: FAMILY MEDICINE

## 2019-01-21 RX ORDER — GUAIFENESIN 600 MG/1
600 TABLET, EXTENDED RELEASE ORAL 2 TIMES DAILY
Qty: 30 TABLET | Refills: 0 | Status: SHIPPED | OUTPATIENT
Start: 2019-01-21 | End: 2019-02-05

## 2019-01-21 RX ORDER — PANTOPRAZOLE SODIUM 40 MG/1
40 TABLET, DELAYED RELEASE ORAL DAILY
Qty: 90 TABLET | Refills: 3 | Status: SHIPPED | OUTPATIENT
Start: 2019-01-21 | End: 2019-10-24

## 2019-01-21 RX ORDER — ALBUTEROL SULFATE 90 UG/1
2 AEROSOL, METERED RESPIRATORY (INHALATION) EVERY 6 HOURS PRN
Qty: 3 INHALER | Refills: 3 | Status: SHIPPED | OUTPATIENT
Start: 2019-01-21 | End: 2019-03-01 | Stop reason: SDUPTHER

## 2019-01-21 RX ORDER — CLONAZEPAM 1 MG/1
1 TABLET ORAL 2 TIMES DAILY PRN
Qty: 60 TABLET | Refills: 2 | Status: SHIPPED | OUTPATIENT
Start: 2019-01-21 | End: 2019-04-24 | Stop reason: SDUPTHER

## 2019-01-21 ASSESSMENT — ENCOUNTER SYMPTOMS
SINUS PRESSURE: 0
SINUS PAIN: 0
SORE THROAT: 1
WHEEZING: 0
FACIAL SWELLING: 0
COUGH: 1
SHORTNESS OF BREATH: 0

## 2019-02-25 ENCOUNTER — OFFICE VISIT (OUTPATIENT)
Dept: FAMILY MEDICINE CLINIC | Age: 74
End: 2019-02-25
Payer: MEDICARE

## 2019-02-25 VITALS
WEIGHT: 92.2 LBS | DIASTOLIC BLOOD PRESSURE: 70 MMHG | BODY MASS INDEX: 16.97 KG/M2 | SYSTOLIC BLOOD PRESSURE: 110 MMHG | HEART RATE: 92 BPM | TEMPERATURE: 99.1 F | HEIGHT: 62 IN | OXYGEN SATURATION: 94 %

## 2019-02-25 DIAGNOSIS — F17.200 TOBACCO DEPENDENCE: ICD-10-CM

## 2019-02-25 DIAGNOSIS — R09.81 NASAL CONGESTION: ICD-10-CM

## 2019-02-25 DIAGNOSIS — J40 BRONCHITIS: Primary | ICD-10-CM

## 2019-02-25 PROCEDURE — 99213 OFFICE O/P EST LOW 20 MIN: CPT | Performed by: NURSE PRACTITIONER

## 2019-02-25 RX ORDER — METHYLPREDNISOLONE 4 MG/1
TABLET ORAL
Qty: 1 KIT | Refills: 0 | Status: SHIPPED | OUTPATIENT
Start: 2019-02-25 | End: 2019-03-03

## 2019-02-25 RX ORDER — DOXYCYCLINE HYCLATE 100 MG
100 TABLET ORAL 2 TIMES DAILY
Qty: 20 TABLET | Refills: 0 | Status: SHIPPED | OUTPATIENT
Start: 2019-02-25 | End: 2019-03-07

## 2019-02-25 RX ORDER — BENZONATATE 100 MG/1
100 CAPSULE ORAL 3 TIMES DAILY PRN
Qty: 20 CAPSULE | Refills: 0 | Status: SHIPPED | OUTPATIENT
Start: 2019-02-25 | End: 2019-05-10 | Stop reason: ALTCHOICE

## 2019-02-25 RX ORDER — AZELASTINE 1 MG/ML
1 SPRAY, METERED NASAL 2 TIMES DAILY
Qty: 1 BOTTLE | Refills: 0 | Status: SHIPPED | OUTPATIENT
Start: 2019-02-25 | End: 2019-05-10

## 2019-02-25 ASSESSMENT — ENCOUNTER SYMPTOMS
ABDOMINAL PAIN: 0
SINUS PAIN: 1
SORE THROAT: 0
SWOLLEN GLANDS: 0
WHEEZING: 1
CHEST TIGHTNESS: 0
VOMITING: 0
RHINORRHEA: 1
DIARRHEA: 1
SINUS PRESSURE: 1
NAUSEA: 1
COUGH: 1
SHORTNESS OF BREATH: 1

## 2019-03-01 DIAGNOSIS — J44.9 MODERATE COPD (CHRONIC OBSTRUCTIVE PULMONARY DISEASE) (HCC): ICD-10-CM

## 2019-03-01 RX ORDER — ALBUTEROL SULFATE 90 UG/1
2 AEROSOL, METERED RESPIRATORY (INHALATION) EVERY 6 HOURS PRN
Qty: 3 INHALER | Refills: 3 | Status: SHIPPED | OUTPATIENT
Start: 2019-03-01 | End: 2019-05-10 | Stop reason: CLARIF

## 2019-03-15 ENCOUNTER — TELEPHONE (OUTPATIENT)
Dept: PHARMACY | Facility: CLINIC | Age: 74
End: 2019-03-15

## 2019-03-27 ENCOUNTER — TELEPHONE (OUTPATIENT)
Dept: FAMILY MEDICINE CLINIC | Age: 74
End: 2019-03-27

## 2019-03-27 RX ORDER — ALBUTEROL SULFATE 90 UG/1
2 AEROSOL, METERED RESPIRATORY (INHALATION) EVERY 6 HOURS PRN
Qty: 1 INHALER | Refills: 3 | Status: SHIPPED | OUTPATIENT
Start: 2019-03-27 | End: 2020-01-21

## 2019-04-24 ENCOUNTER — OFFICE VISIT (OUTPATIENT)
Dept: FAMILY MEDICINE CLINIC | Age: 74
End: 2019-04-24
Payer: MEDICARE

## 2019-04-24 VITALS
WEIGHT: 93.4 LBS | OXYGEN SATURATION: 96 % | HEART RATE: 89 BPM | DIASTOLIC BLOOD PRESSURE: 78 MMHG | BODY MASS INDEX: 17.08 KG/M2 | SYSTOLIC BLOOD PRESSURE: 122 MMHG

## 2019-04-24 DIAGNOSIS — J44.9 CHRONIC OBSTRUCTIVE PULMONARY DISEASE, UNSPECIFIED COPD TYPE (HCC): ICD-10-CM

## 2019-04-24 DIAGNOSIS — F41.9 ANXIETY AND DEPRESSION: ICD-10-CM

## 2019-04-24 DIAGNOSIS — F32.A ANXIETY AND DEPRESSION: ICD-10-CM

## 2019-04-24 DIAGNOSIS — K21.9 GASTROESOPHAGEAL REFLUX DISEASE WITHOUT ESOPHAGITIS: ICD-10-CM

## 2019-04-24 DIAGNOSIS — F41.9 ANXIETY: Primary | ICD-10-CM

## 2019-04-24 DIAGNOSIS — F51.01 PRIMARY INSOMNIA: ICD-10-CM

## 2019-04-24 PROCEDURE — 99214 OFFICE O/P EST MOD 30 MIN: CPT | Performed by: FAMILY MEDICINE

## 2019-04-24 PROCEDURE — G0444 DEPRESSION SCREEN ANNUAL: HCPCS | Performed by: FAMILY MEDICINE

## 2019-04-24 RX ORDER — MONTELUKAST SODIUM 10 MG/1
10 TABLET ORAL NIGHTLY
Qty: 90 TABLET | Refills: 3 | Status: SHIPPED | OUTPATIENT
Start: 2019-04-24 | End: 2020-05-26

## 2019-04-24 RX ORDER — TRAZODONE HYDROCHLORIDE 50 MG/1
50 TABLET ORAL NIGHTLY
Qty: 90 TABLET | Refills: 3 | Status: SHIPPED | OUTPATIENT
Start: 2019-04-24 | End: 2020-04-14

## 2019-04-24 RX ORDER — CLONAZEPAM 1 MG/1
1 TABLET ORAL 2 TIMES DAILY PRN
Qty: 60 TABLET | Refills: 2 | Status: SHIPPED | OUTPATIENT
Start: 2019-04-24 | End: 2019-11-22 | Stop reason: SDUPTHER

## 2019-04-24 ASSESSMENT — ENCOUNTER SYMPTOMS
COUGH: 0
SHORTNESS OF BREATH: 0
SINUS PRESSURE: 0
SINUS PAIN: 0
SORE THROAT: 0

## 2019-04-24 ASSESSMENT — PATIENT HEALTH QUESTIONNAIRE - PHQ9
2. FEELING DOWN, DEPRESSED OR HOPELESS: 3
4. FEELING TIRED OR HAVING LITTLE ENERGY: 3
SUM OF ALL RESPONSES TO PHQ9 QUESTIONS 1 & 2: 3
7. TROUBLE CONCENTRATING ON THINGS, SUCH AS READING THE NEWSPAPER OR WATCHING TELEVISION: 0
10. IF YOU CHECKED OFF ANY PROBLEMS, HOW DIFFICULT HAVE THESE PROBLEMS MADE IT FOR YOU TO DO YOUR WORK, TAKE CARE OF THINGS AT HOME, OR GET ALONG WITH OTHER PEOPLE: 3
SUM OF ALL RESPONSES TO PHQ QUESTIONS 1-9: 9
9. THOUGHTS THAT YOU WOULD BE BETTER OFF DEAD, OR OF HURTING YOURSELF: 0
3. TROUBLE FALLING OR STAYING ASLEEP: 0
5. POOR APPETITE OR OVEREATING: 3
8. MOVING OR SPEAKING SO SLOWLY THAT OTHER PEOPLE COULD HAVE NOTICED. OR THE OPPOSITE, BEING SO FIGETY OR RESTLESS THAT YOU HAVE BEEN MOVING AROUND A LOT MORE THAN USUAL: 0
SUM OF ALL RESPONSES TO PHQ QUESTIONS 1-9: 9
1. LITTLE INTEREST OR PLEASURE IN DOING THINGS: 0

## 2019-04-24 NOTE — PROGRESS NOTES
Talks on phone: Not on file     Gets together: Not on file     Attends Baptism service: Not on file     Active member of club or organization: Not on file     Attends meetings of clubs or organizations: Not on file     Relationship status: Not on file    Intimate partner violence:     Fear of current or ex partner: Not on file     Emotionally abused: Not on file     Physically abused: Not on file     Forced sexual activity: Not on file   Other Topics Concern    Not on file   Social History Narrative    Not on file       Allergies   Allergen Reactions    Pravastatin Sodium Other (See Comments)     Myalgias (note from 5/15/18)     Current Outpatient Medications   Medication Sig Dispense Refill    traZODone (DESYREL) 50 MG tablet Take 1 tablet by mouth nightly 90 tablet 3    montelukast (SINGULAIR) 10 MG tablet Take 1 tablet by mouth nightly 90 tablet 3    clonazePAM (KLONOPIN) 1 MG tablet Take 1 tablet by mouth 2 times daily as needed for Anxiety for up to 30 days. 60 tablet 2    albuterol sulfate HFA (VENTOLIN HFA) 108 (90 Base) MCG/ACT inhaler Inhale 2 puffs into the lungs every 6 hours as needed for Wheezing 1 Inhaler 3    azelastine (ASTELIN) 0.1 % nasal spray 1 spray by Nasal route 2 times daily Use in each nostril as directed 1 Bottle 0    pantoprazole (PROTONIX) 40 MG tablet Take 1 tablet by mouth daily 90 tablet 3    VENTOLIN  (90 Base) MCG/ACT inhaler USE 2 INHALATIONS ORALLY   INTO THE LUNGS EVERY 6     HOURS AS NEEDEDFOR        WHEEZING 54 g 3    melatonin 5 MG TABS tablet Take 5 mg by mouth nightly      aspirin 81 MG tablet Take 81 mg by mouth daily.       albuterol sulfate HFA (PROAIR HFA) 108 (90 Base) MCG/ACT inhaler Inhale 2 puffs into the lungs every 6 hours as needed for Wheezing or Shortness of Breath 3 Inhaler 3    benzonatate (TESSALON PERLES) 100 MG capsule Take 1 capsule by mouth 3 times daily as needed for Cough 20 capsule 0     No current facility-administered medications for this visit. Review of Systems   Constitutional: Negative for activity change, chills, fatigue and fever. HENT: Negative for congestion, sinus pressure, sinus pain and sore throat. Respiratory: Negative for cough and shortness of breath. Cardiovascular: Negative for chest pain and leg swelling. Musculoskeletal: Negative for arthralgias and myalgias. Neurological: Negative for dizziness and headaches. Psychiatric/Behavioral: Positive for agitation and sleep disturbance. Negative for decreased concentration. The patient is nervous/anxious.         Lab Results   Component Value Date    WBC 3.9 (L) 10/14/2018    HGB 11.7 (L) 10/14/2018    HCT 37.3 10/14/2018    MCV 96.1 10/14/2018     10/14/2018     Lab Results   Component Value Date     10/14/2018    K 3.9 10/14/2018     10/14/2018    CO2 28 10/14/2018    BUN 8 10/14/2018    CREATININE 0.7 10/14/2018    GLUCOSE 72 10/14/2018    CALCIUM 8.0 (L) 10/14/2018    PROT 6.6 10/12/2018    LABALBU 4.1 10/12/2018    BILITOT 0.4 10/12/2018    ALKPHOS 80 10/12/2018    AST 24 10/12/2018    ALT 19 10/12/2018    LABGLOM >60 10/14/2018    GFRAA >60 10/14/2018     Lab Results   Component Value Date    CHOL 164 05/16/2018    CHOL 167 07/10/2011    CHOL 188 05/18/2011     Lab Results   Component Value Date    TRIG 93 05/16/2018    TRIG 74 07/10/2011    TRIG 131 05/18/2011     Lab Results   Component Value Date    HDL 62 05/16/2018    HDL 47 (L) 07/10/2011    HDL 57 (L) 05/18/2011     Lab Results   Component Value Date    LDLCALC 83 05/16/2018     No results found for: LABA1C  Lab Results   Component Value Date    TSH 5.330 05/16/2018    TSHHS 1.330 07/10/2011         /78   Pulse 89   Wt 93 lb 6.4 oz (42.4 kg)   SpO2 96%   BMI 17.08 kg/m²     BP Readings from Last 3 Encounters:   04/24/19 122/78   02/25/19 110/70   01/21/19 105/65       Wt Readings from Last 3 Encounters:   04/24/19 93 lb 6.4 oz (42.4 kg)   02/25/19 92 lb 3.2 oz (41.8 kg)   01/21/19 92 lb 3.2 oz (41.8 kg)         Physical Exam   Constitutional: She is oriented to person, place, and time. She appears well-developed and well-nourished. No distress. HENT:   Head: Normocephalic and atraumatic. Cardiovascular: Normal rate, regular rhythm and normal heart sounds. No murmur heard. Pulmonary/Chest: Effort normal and breath sounds normal. She has no wheezes. Musculoskeletal: Normal range of motion. She exhibits no edema. Neurological: She is alert and oriented to person, place, and time. No cranial nerve deficit. She exhibits normal muscle tone. Coordination normal.   Skin: She is not diaphoretic. Psychiatric: She has a normal mood and affect. Her behavior is normal. Judgment and thought content normal.       ASSESSMENT/ PLAN:    1. Anxiety  - stable  - clonazePAM (KLONOPIN) 1 MG tablet; Take 1 tablet by mouth 2 times daily as needed for Anxiety for up to 30 days. Dispense: 60 tablet; Refill: 2    2. Anxiety and depression  - patient refuse any more medication    3. Primary insomnia  - under control   - traZODone (DESYREL) 50 MG tablet; Take 1 tablet by mouth nightly  Dispense: 90 tablet; Refill: 3    4. Gastroesophageal reflux disease without esophagitis  - stable    5. Chronic obstructive pulmonary disease, unspecified [facility] ([facility]  - stable              - Appropriateprescription are addressed. - After visit summery provided. - Questions answered and patient verbalizes understanding.  - Call for any problem, questions, or concerns.  - RTC if symptoms worse. Return in about 3 months (around 7/24/2019).

## 2019-05-10 ENCOUNTER — OFFICE VISIT (OUTPATIENT)
Dept: FAMILY MEDICINE CLINIC | Age: 74
End: 2019-05-10
Payer: MEDICARE

## 2019-05-10 VITALS
WEIGHT: 93 LBS | OXYGEN SATURATION: 93 % | TEMPERATURE: 98.1 F | HEART RATE: 74 BPM | DIASTOLIC BLOOD PRESSURE: 72 MMHG | SYSTOLIC BLOOD PRESSURE: 106 MMHG | HEIGHT: 62 IN | BODY MASS INDEX: 17.11 KG/M2

## 2019-05-10 DIAGNOSIS — J01.90 ACUTE SINUSITIS, RECURRENCE NOT SPECIFIED, UNSPECIFIED LOCATION: Primary | ICD-10-CM

## 2019-05-10 PROCEDURE — 99213 OFFICE O/P EST LOW 20 MIN: CPT | Performed by: NURSE PRACTITIONER

## 2019-05-10 RX ORDER — AMOXICILLIN AND CLAVULANATE POTASSIUM 875; 125 MG/1; MG/1
1 TABLET, FILM COATED ORAL 2 TIMES DAILY
Qty: 20 TABLET | Refills: 0 | Status: SHIPPED | OUTPATIENT
Start: 2019-05-10 | End: 2019-05-20

## 2019-05-10 ASSESSMENT — ENCOUNTER SYMPTOMS
COUGH: 1
WHEEZING: 0
SINUS PRESSURE: 1
HOARSE VOICE: 0
NAUSEA: 0
SORE THROAT: 0
SINUS COMPLAINT: 1
SHORTNESS OF BREATH: 0

## 2019-05-10 NOTE — PROGRESS NOTES
Liormary Saravia  1945  75 y.o. SUBJECT PURNIMA:    Chief Complaint   Patient presents with    Cough     x Monday, it is productive. She notes wheezing after being outside and she is using her albuterol inhaler which makes her cough up more sputum. Denies chills.  Allergies     She worked out in the yard Monday and was coughing, itching and sneezing that night. She is taking loratadine. Mare Silver is a 75year old female who is in with 7 days of nasal congestion, face discomfort, cough and congestion. She states her symptoms worsened 4 days ago when she was working in the yard. She states she has been using her inhaler more often. She denies chest pain, fevers, chills or sweats. Sinus Problem   This is a new problem. The current episode started in the past 7 days. The problem has been gradually worsening since onset. There has been no fever. Her pain is at a severity of 4/10. The pain is mild. Associated symptoms include congestion, coughing, sinus pressure and sneezing. Pertinent negatives include no chills, diaphoresis, ear pain, headaches, hoarse voice, neck pain, shortness of breath or sore throat. Past treatments include saline sprays. The treatment provided no relief. Current Outpatient Medications on File Prior to Visit   Medication Sig Dispense Refill    LORATADINE PO Take by mouth daily as needed      traZODone (DESYREL) 50 MG tablet Take 1 tablet by mouth nightly 90 tablet 3    montelukast (SINGULAIR) 10 MG tablet Take 1 tablet by mouth nightly 90 tablet 3    clonazePAM (KLONOPIN) 1 MG tablet Take 1 tablet by mouth 2 times daily as needed for Anxiety for up to 30 days.  60 tablet 2    albuterol sulfate HFA (VENTOLIN HFA) 108 (90 Base) MCG/ACT inhaler Inhale 2 puffs into the lungs every 6 hours as needed for Wheezing 1 Inhaler 3    pantoprazole (PROTONIX) 40 MG tablet Take 1 tablet by mouth daily 90 tablet 3    melatonin 5 MG TABS tablet Take 5 mg by mouth nightly      aspirin 81 MG tablet Take 81 mg by mouth daily. No current facility-administered medications on file prior to visit. Past Medical History:   Diagnosis Date    Former smoker 2018    Hypertension     Moderate COPD (chronic obstructive pulmonary disease) ([facility] [address] 2018    Other emphysema ([facility] [address] 2018     Past Surgical History:   Procedure Laterality Date    ABDOMEN SURGERY      CHOLECYSTECTOMY      HYSTERECTOMY      OTHER SURGICAL HISTORY  2018    exp lap with TUNG     Family History   Problem Relation Age of Onset    Heart Disease Mother     Diabetes Mother     Cancer Father     Diabetes Brother      Social History     Socioeconomic History    Marital status:      Spouse name: Not on file    Number of children: Not on file    Years of education: Not on file    Highest education level: Not on file   Occupational History    Not on file   Social Needs    Financial resource strain: Not on file    Food insecurity:     Worry: Not on file     Inability: Not on file    Transportation needs:     Medical: Not on file     Non-medical: Not on file   Tobacco Use    Smoking status: Former Smoker     Packs/day: 0.50     Years: 50.00     Pack years: 25.00     Types: Cigarettes     Last attempt to quit: 2018     Years since quittin.4    Smokeless tobacco: Never Used    Tobacco comment: Off and on smoker pt states.    Substance and Sexual Activity    Alcohol use: No    Drug use: No    Sexual activity: Not on file   Lifestyle    Physical activity:     Days per week: Not on file     Minutes per session: Not on file    Stress: Not on file   Relationships    Social connections:     Talks on phone: Not on file     Gets together: Not on file     Attends [employer] service: Not on file     Active member of club or organization: Not on file     Attends meetings of clubs or organizations: Not on file     Relationship status: Not on file    Intimate partner violence:     Fear of current or ex partner: Not on file     Emotionally abused: Not on file     Physically abused: Not on file     Forced sexual activity: Not on file   Other Topics Concern    Not on file   Social History Narrative    Not on file       Review of Systems   Constitutional: Negative for activity change, appetite change, chills, diaphoresis, fatigue and fever. HENT: Positive for congestion, sinus pressure and sneezing. Negative for ear pain, hoarse voice and sore throat. Respiratory: Positive for cough. Negative for shortness of breath and wheezing. Cardiovascular: Negative for chest pain and palpitations. Gastrointestinal: Negative for nausea. Musculoskeletal: Negative for neck pain. Neurological: Negative for dizziness, light-headedness and headaches. OBJECTIVE:     /72   Pulse 74   Temp 98.1 °F (36.7 °C) (Oral)   Ht 5' 2\" (1.575 m)   Wt 93 lb (42.2 kg)   SpO2 93%   BMI 17.01 kg/m²     Physical Exam   Constitutional: She is oriented to person, place, and time. She appears well-developed and well-nourished. No distress. HENT:   Head: Normocephalic and atraumatic. Right Ear: External ear normal.   Left Ear: External ear normal.   Nose: Mucosal edema and sinus tenderness present. Right sinus exhibits maxillary sinus tenderness. Right sinus exhibits no frontal sinus tenderness. Left sinus exhibits maxillary sinus tenderness. Left sinus exhibits no frontal sinus tenderness. Mouth/Throat: Uvula is midline, oropharynx is clear and moist and mucous membranes are normal.   Eyes: Pupils are equal, round, and reactive to light. Conjunctivae and EOM are normal.   Neck: Normal range of motion. Neck supple. Cardiovascular: Normal rate, regular rhythm and normal heart sounds. Pulmonary/Chest: Effort normal and breath sounds normal.   Abdominal: Soft. Musculoskeletal: Normal range of motion. Neurological: She is alert and oriented to person, place, and time. Skin: Skin is warm and dry. She is not diaphoretic. Psychiatric: She has a normal mood and affect. Her behavior is normal. Judgment and thought content normal.   Vitals reviewed. No results found for requested labs within last 30 days. LDL Calculated (mg/dL)   Date Value   05/16/2018 83       Lab Results   Component Value Date    WBC 3.9 10/14/2018    WBC 5.0 10/13/2018    WBC 6.1 10/12/2018    NEUTROABS 3.0 05/16/2018    HGB 11.7 10/14/2018    HGB 12.9 10/13/2018    HGB 15.2 10/12/2018    HCT 37.3 10/14/2018    HCT 41.0 10/13/2018    HCT 47.8 10/12/2018    MCV 96.1 10/14/2018    MCV 97.2 10/13/2018    MCV 96.2 10/12/2018     10/14/2018     10/13/2018     10/12/2018    SEGSABS 1.7 10/14/2018    SEGSABS 2.8 10/13/2018    SEGSABS 3.2 10/12/2018    LYMPHSABS 1.6 10/14/2018    MONOSABS 0.5 10/14/2018    EOSABS 0.1 10/14/2018    BASOSABS 0.0 10/14/2018     Lab Results   Component Value Date    TSH 5.330 05/16/2018    TSHHS 1.330 07/10/2011     Lab Results   Component Value Date    LABALBU 4.1 10/12/2018    BILITOT 0.4 10/12/2018    BILIDIR 0.1 07/09/2011    IBILI 0.3 07/09/2011    AST 24 10/12/2018    ALT 19 10/12/2018    ALKPHOS 80 10/12/2018             No results found for this visit on 05/10/19. ASSESSMENT AND PLAN:     1. Acute sinusitis, recurrence not specified, unspecified location  - amoxicillin-clavulanate (AUGMENTIN) 875-125 MG per tablet; Take 1 tablet by mouth 2 times daily for 10 days  Dispense: 20 tablet; Refill: 0    Eat cup of yogurt while taking medication  Continue with current medication as directed  Warm salt gargles for throat irritation  1 tsp honey every morning for throat discomfort  Return to clinic with any problems  Keep follow up appointment with PCP  Verbalized understanding and agreement with plan      Return if symptoms worsen or fail to improve. Care discussed with patient. Patient educated on signs and symptoms of exacerbation and when to seek further medical attention.  Advised to call for any problems, questions, or concerns. Patient verbalizes understanding and agrees with plan. Medications reviewed and reconciled. Continue current medications. Appropriate prescriptions are ordered. Risks and benefits of meds are discussed. After visit summary provided.

## 2019-05-10 NOTE — PATIENT INSTRUCTIONS
Patient Education        Sinusitis: Care Instructions  Your Care Instructions    Sinusitis is an infection of the lining of the sinus cavities in your head. Sinusitis often follows a cold. It causes pain and pressure in your head and face. In most cases, sinusitis gets better on its own in 1 to 2 weeks. But some mild symptoms may last for several weeks. Sometimes antibiotics are needed. Follow-up care is a key part of your treatment and safety. Be sure to make and go to all appointments, and call your doctor if you are having problems. It's also a good idea to know your test results and keep a list of the medicines you take. How can you care for yourself at home? · Take an over-the-counter pain medicine, such as acetaminophen (Tylenol), ibuprofen (Advil, Motrin), or naproxen (Aleve). Read and follow all instructions on the label. · If the doctor prescribed antibiotics, take them as directed. Do not stop taking them just because you feel better. You need to take the full course of antibiotics. · Be careful when taking over-the-counter cold or flu medicines and Tylenol at the same time. Many of these medicines have acetaminophen, which is Tylenol. Read the labels to make sure that you are not taking more than the recommended dose. Too much acetaminophen (Tylenol) can be harmful. · Breathe warm, moist air from a steamy shower, a hot bath, or a sink filled with hot water. Avoid cold, dry air. Using a humidifier in your home may help. Follow the directions for cleaning the machine. · Use saline (saltwater) nasal washes to help keep your nasal passages open and wash out mucus and bacteria. You can buy saline nose drops at a grocery store or drugstore. Or you can make your own at home by adding 1 teaspoon of salt and 1 teaspoon of baking soda to 2 cups of distilled water. If you make your own, fill a bulb syringe with the solution, insert the tip into your nostril, and squeeze gently. Jennye Garb your nose.   · Put a hot, wet towel or a warm gel pack on your face 3 or 4 times a day for 5 to 10 minutes each time. · Try a decongestant nasal spray like oxymetazoline (Afrin). Do not use it for more than 3 days in a row. Using it for more than 3 days can make your congestion worse. When should you call for help? Call your doctor now or seek immediate medical care if:    · You have new or worse swelling or redness in your face or around your eyes.     · You have a new or higher fever.    Watch closely for changes in your health, and be sure to contact your doctor if:    · You have new or worse facial pain.     · The mucus from your nose becomes thicker (like pus) or has new blood in it.     · You are not getting better as expected. Where can you learn more? Go to [email or web address].[email or web address] org and sign in to your [employer] account. Enter Q260 in the [employer] box to learn more about \"Sinusitis: Care Instructions. \"     If you do not have an account, please click on the \"Sign Up Now\" link. Current as of: October 21, 2018  Content Version: 12.0  © 5760-4557 Healthwise, [employer]). Care instructions adapted under license by [facility] ([facility]). If you have questions about a medical condition or this instruction, always ask your healthcare professional. Jose Antonio Lomeli any warranty or liability for your use of this information.        Increase fluids, rest  Take prescribed medication as directed  Eat cup of yogurt while taking medication  Continue with current medication as directed  Warm salt gargles for throat irritation  1 tsp honey every morning for throat discomfort  Return to clinic with any problems  Keep follow up appointment with PCP  Verbalized understanding and agreement with plan

## 2019-07-24 ENCOUNTER — OFFICE VISIT (OUTPATIENT)
Dept: FAMILY MEDICINE CLINIC | Age: 74
End: 2019-07-24
Payer: MEDICARE

## 2019-07-24 VITALS
HEART RATE: 90 BPM | WEIGHT: 89 LBS | BODY MASS INDEX: 16.28 KG/M2 | SYSTOLIC BLOOD PRESSURE: 100 MMHG | DIASTOLIC BLOOD PRESSURE: 70 MMHG | OXYGEN SATURATION: 98 %

## 2019-07-24 DIAGNOSIS — R63.4 WEIGHT LOSS: ICD-10-CM

## 2019-07-24 DIAGNOSIS — R63.0 POOR APPETITE: ICD-10-CM

## 2019-07-24 DIAGNOSIS — F41.9 ANXIETY: ICD-10-CM

## 2019-07-24 DIAGNOSIS — J20.9 ACUTE BRONCHITIS, UNSPECIFIED ORGANISM: ICD-10-CM

## 2019-07-24 DIAGNOSIS — K21.9 GASTROESOPHAGEAL REFLUX DISEASE WITHOUT ESOPHAGITIS: ICD-10-CM

## 2019-07-24 DIAGNOSIS — J44.9 CHRONIC OBSTRUCTIVE PULMONARY DISEASE, UNSPECIFIED COPD TYPE (HCC): Primary | ICD-10-CM

## 2019-07-24 PROCEDURE — 99214 OFFICE O/P EST MOD 30 MIN: CPT | Performed by: FAMILY MEDICINE

## 2019-07-24 RX ORDER — MEGESTROL ACETATE 20 MG/1
20 TABLET ORAL DAILY
Qty: 30 TABLET | Refills: 3 | Status: SHIPPED | OUTPATIENT
Start: 2019-07-24 | End: 2019-10-24

## 2019-07-24 RX ORDER — LEVOFLOXACIN 500 MG/1
500 TABLET, FILM COATED ORAL DAILY
Qty: 5 TABLET | Refills: 0 | Status: SHIPPED | OUTPATIENT
Start: 2019-07-24 | End: 2020-03-16 | Stop reason: SDUPTHER

## 2019-07-25 ENCOUNTER — TELEPHONE (OUTPATIENT)
Dept: FAMILY MEDICINE CLINIC | Age: 74
End: 2019-07-25

## 2019-07-25 NOTE — TELEPHONE ENCOUNTER
Completed PA for Meg[employer] via phone and it was denied. The only way patient insurance will cover is if the Dx is weight loss due to cancer or aids. Please Advise.

## 2019-08-10 ASSESSMENT — ENCOUNTER SYMPTOMS
SINUS PRESSURE: 0
SINUS PAIN: 0
SHORTNESS OF BREATH: 1
WHEEZING: 1
COUGH: 1
SORE THROAT: 1
HEARTBURN: 0

## 2019-09-10 ENCOUNTER — TELEPHONE (OUTPATIENT)
Dept: FAMILY MEDICINE CLINIC | Age: 74
End: 2019-09-10

## 2019-09-10 NOTE — TELEPHONE ENCOUNTER
[facility] Transitions Initial Follow Up Call    Outreach made within 2 business days of discharge: Yes    Patient: Lester Castro Patient : 1945   MRN: [id number]  Reason for Admission: Bowel Obstruction  Discharge Date: 19       Spoke with: Patient    Discharge department/facility: [facility]    TCM Interactive Patient Contact:  Was patient able to fill all prescriptions: No: She was not sent home with any medications for this visit  Was patient instructed to bring all medications to the follow-up visit: Yes  Is patient taking all medications as directed in the discharge summary?  Yes  Does patient understand their discharge instructions: Yes  Does patient have questions or concerns that need addressed prior to 7-14 day follow up office visit: no    Scheduled appointment with PCP within 7-14 days    Follow Up  Future Appointments   Date Time Provider Maria Del Carmen Carson   2019  2:00 PM MIREILLE Fry -  [facility]   10/24/2019  1:15 PM Sarah Brown MD [facility]   10/24/2019  1:30 PM Sarah Brown MD [facility]       Frank Dawson LPN

## 2019-09-12 ENCOUNTER — OFFICE VISIT (OUTPATIENT)
Dept: FAMILY MEDICINE CLINIC | Age: 74
End: 2019-09-12
Payer: MEDICARE

## 2019-09-12 VITALS
HEIGHT: 62 IN | OXYGEN SATURATION: 97 % | SYSTOLIC BLOOD PRESSURE: 124 MMHG | WEIGHT: 89.2 LBS | HEART RATE: 78 BPM | DIASTOLIC BLOOD PRESSURE: 72 MMHG | BODY MASS INDEX: 16.41 KG/M2

## 2019-09-12 DIAGNOSIS — Z87.19 HISTORY OF SMALL BOWEL OBSTRUCTION: ICD-10-CM

## 2019-09-12 DIAGNOSIS — R11.0 NAUSEA: Primary | ICD-10-CM

## 2019-09-12 PROCEDURE — 1111F DSCHRG MED/CURRENT MED MERGE: CPT | Performed by: NURSE PRACTITIONER

## 2019-09-12 PROCEDURE — 99495 TRANSJ CARE MGMT MOD F2F 14D: CPT | Performed by: NURSE PRACTITIONER

## 2019-09-12 RX ORDER — PROCHLORPERAZINE MALEATE 10 MG
10 TABLET ORAL EVERY 6 HOURS PRN
Qty: 30 TABLET | Refills: 1 | Status: SHIPPED | OUTPATIENT
Start: 2019-09-12 | End: 2019-10-24

## 2019-09-12 ASSESSMENT — ENCOUNTER SYMPTOMS
ABDOMINAL DISTENTION: 0
COUGH: 0
VOMITING: 0
NAUSEA: 1
SHORTNESS OF BREATH: 0
CHEST TIGHTNESS: 0
ABDOMINAL PAIN: 1

## 2019-09-12 NOTE — PROGRESS NOTES
prochlorperazine (COMPAZINE) 10 MG tablet; Take 1 tablet by mouth every 6 hours as needed (for nausea)  Dispense: 30 tablet; Refill: 1  -To prevent dehydration, drink plenty of fluids, enough so that your urine is light yellow or clear like water. Choose water and other caffeine-free clear liquids until you feel better. If you have kidney, heart, or liver disease and have to limit fluids, talk with your doctor before you increase the amount of fluids you drink.  -Rest in bed until you feel better.  -When you are able to eat, try clear soups, mild foods, and liquids until all symptoms are gone for 12 to 48 hours. Other good choices include dry toast, crackers, cooked cereal, and gelatin dessert, such as Jell-O.      2. History of small bowel obstruction  -Try to eat smaller amounts of food more often. For example, have 5 or 6 small meals throughout the day instead of 2 or 3 large meals.  -Chew your food very well. Try to chew each bite about 20 times or until it is liquid.  -Avoid high-fiber foods and raw fruits and vegetables with skins, husks, strings, or seeds. These can form a ball of undigested material that can cause a blockage if a part of your bowel is scarred or narrowed. -Check with your doctor before you eat whole-grain products or use a fiber supplement such as Citrucel or Metamucil.  -To help you have regular bowel movements, eat at regular times, do not strain during a bowel movement, and drink at least 8 to 10 glasses of water each day. If you have kidney, heart, or liver disease and have to limit fluids, talk with your doctor or before you increase the amount of fluids you drink.  -Drink high-calorie liquid formulas if your doctor says to. Severe symptoms may make it hard for your body to take in vitamins and minerals. -Get regular exercise. It helps you digest your food better. Get at least 30 minutes of physical activity on most days of the week. Walking is a good choice.         Medical Decision

## 2019-09-20 ENCOUNTER — OFFICE VISIT (OUTPATIENT)
Dept: FAMILY MEDICINE CLINIC | Age: 74
End: 2019-09-20
Payer: MEDICARE

## 2019-09-20 VITALS
DIASTOLIC BLOOD PRESSURE: 74 MMHG | BODY MASS INDEX: 16.3 KG/M2 | WEIGHT: 88.6 LBS | RESPIRATION RATE: 14 BRPM | HEIGHT: 62 IN | OXYGEN SATURATION: 97 % | SYSTOLIC BLOOD PRESSURE: 118 MMHG | HEART RATE: 96 BPM

## 2019-09-20 DIAGNOSIS — J40 BRONCHITIS: Primary | ICD-10-CM

## 2019-09-20 PROCEDURE — 99213 OFFICE O/P EST LOW 20 MIN: CPT | Performed by: NURSE PRACTITIONER

## 2019-09-20 RX ORDER — BENZONATATE 100 MG/1
100 CAPSULE ORAL 3 TIMES DAILY PRN
Qty: 20 CAPSULE | Refills: 0 | Status: SHIPPED | OUTPATIENT
Start: 2019-09-20 | End: 2019-10-24

## 2019-09-20 RX ORDER — METHYLPREDNISOLONE 4 MG/1
TABLET ORAL
Qty: 1 KIT | Refills: 0 | Status: SHIPPED | OUTPATIENT
Start: 2019-09-20 | End: 2019-09-26

## 2019-09-20 RX ORDER — GUAIFENESIN 600 MG/1
600 TABLET, EXTENDED RELEASE ORAL 2 TIMES DAILY
Qty: 20 TABLET | Refills: 0 | Status: SHIPPED | OUTPATIENT
Start: 2019-09-20 | End: 2019-10-24

## 2019-09-20 ASSESSMENT — ENCOUNTER SYMPTOMS
VOMITING: 0
SORE THROAT: 1
COUGH: 1
HEARTBURN: 0
DIARRHEA: 0
SINUS PRESSURE: 1
NAUSEA: 0
HEMOPTYSIS: 0
RHINORRHEA: 1
SINUS PAIN: 0
WHEEZING: 0
CHEST TIGHTNESS: 1
SHORTNESS OF BREATH: 0

## 2019-09-20 NOTE — PROGRESS NOTES
Chance Rodriguez   75 y.o.  female  [id number]      Chief Complaint   Patient presents with    Cough    Ear Fullness     bilateral    Chest Congestion    Head Congestion        Subjective:  75 y. o.female is here for a follow up. She has the following chronic/acute medical problems:  Patient Active Problem List   Diagnosis    Essential hypertension    Mixed hyperlipidemia    Anxiety    Primary insomnia    Lung nodule    [facility] [address]    Former smoker    Moderate COPD (chronic obstructive pulmonary disease) ([facility] [address]    Vaginal dryness    H/O total hysterectomy    Vaginal atrophy    Leg cramping    Bowel obstruction (HCC)    SBO (small bowel obstruction) (HCC)    Adynamic ileus ([facility] [address]    Tobacco abuse    Lower abdominal pain    Nausea    Abnormal CT of the abdomen    Ileus ([facility] [address]    Gastroesophageal reflux disease without esophagitis       Cough   This is a new problem. The current episode started yesterday. The problem has been unchanged. Episode frequency: intermittantly. The cough is productive of sputum. Associated symptoms include ear pain, headaches, nasal congestion, postnasal drip, rhinorrhea and a sore throat. Pertinent negatives include no chest pain, chills, ear congestion, fever, heartburn, hemoptysis, myalgias, rash, shortness of breath, sweats, weight loss or wheezing. Nothing aggravates the symptoms. She has tried nothing for the symptoms. Her past medical history is significant for COPD. Review of Systems   Constitutional: Positive for appetite change (decreased) and fatigue. Negative for chills, fever and weight loss. HENT: Positive for congestion, ear pain, postnasal drip, rhinorrhea, sinus pressure and sore throat. Negative for sinus pain and sneezing. Respiratory: Positive for cough and chest tightness. Negative for hemoptysis, shortness of breath and wheezing. Cardiovascular: Negative for chest pain and palpitations.    Gastrointestinal: Negative for diarrhea,

## 2019-09-25 RX ORDER — AZITHROMYCIN 250 MG/1
TABLET, FILM COATED ORAL
Qty: 1 PACKET | Refills: 0 | Status: SHIPPED | OUTPATIENT
Start: 2019-09-25 | End: 2019-10-24

## 2019-09-25 NOTE — TELEPHONE ENCOUNTER
Patient was seen in walk-in care clinic on 9/20/19 and still not feeling any better. She states she was asked to all the office if not better by Wednesday. Please advise.

## 2019-10-24 ENCOUNTER — OFFICE VISIT (OUTPATIENT)
Dept: FAMILY MEDICINE CLINIC | Age: 74
End: 2019-10-24
Payer: MEDICARE

## 2019-10-24 VITALS
OXYGEN SATURATION: 95 % | WEIGHT: 85.2 LBS | DIASTOLIC BLOOD PRESSURE: 84 MMHG | HEART RATE: 74 BPM | HEIGHT: 62 IN | SYSTOLIC BLOOD PRESSURE: 122 MMHG | BODY MASS INDEX: 15.68 KG/M2

## 2019-10-24 DIAGNOSIS — F41.9 ANXIETY AND DEPRESSION: ICD-10-CM

## 2019-10-24 DIAGNOSIS — J44.9 CHRONIC OBSTRUCTIVE PULMONARY DISEASE, UNSPECIFIED COPD TYPE (HCC): Primary | ICD-10-CM

## 2019-10-24 DIAGNOSIS — K21.9 GASTROESOPHAGEAL REFLUX DISEASE WITHOUT ESOPHAGITIS: ICD-10-CM

## 2019-10-24 DIAGNOSIS — Z23 NEED FOR INFLUENZA VACCINATION: ICD-10-CM

## 2019-10-24 DIAGNOSIS — Z00.00 ROUTINE GENERAL MEDICAL EXAMINATION AT A HEALTH CARE FACILITY: Primary | ICD-10-CM

## 2019-10-24 DIAGNOSIS — F32.A ANXIETY AND DEPRESSION: ICD-10-CM

## 2019-10-24 DIAGNOSIS — R10.13 EPIGASTRIC PAIN: ICD-10-CM

## 2019-10-24 DIAGNOSIS — R63.0 POOR APPETITE: ICD-10-CM

## 2019-10-24 PROCEDURE — G0438 PPPS, INITIAL VISIT: HCPCS | Performed by: FAMILY MEDICINE

## 2019-10-24 PROCEDURE — 36415 COLL VENOUS BLD VENIPUNCTURE: CPT | Performed by: FAMILY MEDICINE

## 2019-10-24 PROCEDURE — 90653 IIV ADJUVANT VACCINE IM: CPT | Performed by: FAMILY MEDICINE

## 2019-10-24 PROCEDURE — G0008 ADMIN INFLUENZA VIRUS VAC: HCPCS | Performed by: FAMILY MEDICINE

## 2019-10-24 PROCEDURE — 99214 OFFICE O/P EST MOD 30 MIN: CPT | Performed by: FAMILY MEDICINE

## 2019-10-24 RX ORDER — PANTOPRAZOLE SODIUM 40 MG/1
40 TABLET, DELAYED RELEASE ORAL DAILY
COMMUNITY
End: 2020-10-06

## 2019-10-24 ASSESSMENT — PATIENT HEALTH QUESTIONNAIRE - PHQ9
SUM OF ALL RESPONSES TO PHQ QUESTIONS 1-9: 0
SUM OF ALL RESPONSES TO PHQ QUESTIONS 1-9: 0

## 2019-10-24 ASSESSMENT — LIFESTYLE VARIABLES: HOW OFTEN DO YOU HAVE A DRINK CONTAINING ALCOHOL: 0

## 2019-10-25 LAB
A/G RATIO: 3.2 (ref 1.1–2.2)
ALBUMIN SERPL-MCNC: 4.8 G/DL (ref 3.4–5)
ALP BLD-CCNC: 82 U/L (ref 40–129)
ALT SERPL-CCNC: 12 U/L (ref 10–40)
AMYLASE: 53 U/L (ref 25–115)
ANION GAP SERPL CALCULATED.3IONS-SCNC: 15 MMOL/L (ref 3–16)
AST SERPL-CCNC: 18 U/L (ref 15–37)
BILIRUB SERPL-MCNC: 0.3 MG/DL (ref 0–1)
BUN BLDV-MCNC: 15 MG/DL (ref 7–20)
CALCIUM SERPL-MCNC: 9.4 MG/DL (ref 8.3–10.6)
CHLORIDE BLD-SCNC: 103 MMOL/L (ref 99–110)
CO2: 27 MMOL/L (ref 21–32)
CREAT SERPL-MCNC: 0.6 MG/DL (ref 0.6–1.2)
GFR AFRICAN AMERICAN: >60
GFR NON-AFRICAN AMERICAN: >60
GLOBULIN: 1.5 G/DL
GLUCOSE BLD-MCNC: 95 MG/DL (ref 70–99)
LIPASE: 23 U/L (ref 13–60)
POTASSIUM SERPL-SCNC: 3.9 MMOL/L (ref 3.5–5.1)
SODIUM BLD-SCNC: 145 MMOL/L (ref 136–145)
T4 FREE: 1.3 NG/DL (ref 0.9–1.8)
TOTAL PROTEIN: 6.3 G/DL (ref 6.4–8.2)
TSH SERPL DL<=0.05 MIU/L-ACNC: 1.28 UIU/ML (ref 0.27–4.2)

## 2019-11-11 PROBLEM — R63.0 POOR APPETITE: Status: ACTIVE | Noted: 2019-11-11

## 2019-11-11 PROBLEM — F41.9 ANXIETY AND DEPRESSION: Status: ACTIVE | Noted: 2019-11-11

## 2019-11-11 PROBLEM — F32.A ANXIETY AND DEPRESSION: Status: ACTIVE | Noted: 2019-11-11

## 2019-11-11 ASSESSMENT — ENCOUNTER SYMPTOMS
NAUSEA: 0
COUGH: 0
SINUS PRESSURE: 0
ABDOMINAL PAIN: 1
DIARRHEA: 0
CONSTIPATION: 0
VOMITING: 0
BLOOD IN STOOL: 0
SINUS PAIN: 0
SORE THROAT: 0
SHORTNESS OF BREATH: 0

## 2019-11-22 ENCOUNTER — OFFICE VISIT (OUTPATIENT)
Dept: FAMILY MEDICINE CLINIC | Age: 74
End: 2019-11-22
Payer: MEDICARE

## 2019-11-22 VITALS
DIASTOLIC BLOOD PRESSURE: 76 MMHG | OXYGEN SATURATION: 93 % | BODY MASS INDEX: 16.02 KG/M2 | TEMPERATURE: 98.8 F | SYSTOLIC BLOOD PRESSURE: 116 MMHG | WEIGHT: 87.6 LBS | HEART RATE: 85 BPM

## 2019-11-22 DIAGNOSIS — J06.9 URTI (ACUTE UPPER RESPIRATORY INFECTION): Primary | ICD-10-CM

## 2019-11-22 PROCEDURE — 99213 OFFICE O/P EST LOW 20 MIN: CPT | Performed by: NURSE PRACTITIONER

## 2019-11-22 RX ORDER — CLONAZEPAM 1 MG/1
TABLET ORAL
COMMUNITY
Start: 2019-09-19 | End: 2020-03-16

## 2019-11-22 RX ORDER — LEVOFLOXACIN 500 MG/1
500 TABLET, FILM COATED ORAL DAILY
Qty: 7 TABLET | Refills: 0 | Status: SHIPPED | OUTPATIENT
Start: 2019-11-22 | End: 2019-11-29

## 2019-11-22 RX ORDER — PREDNISONE 20 MG/1
TABLET ORAL
Qty: 18 TABLET | Refills: 0 | Status: SHIPPED | OUTPATIENT
Start: 2019-11-22 | End: 2020-01-30

## 2019-11-22 ASSESSMENT — ENCOUNTER SYMPTOMS
HEMOPTYSIS: 0
DIARRHEA: 0
VOMITING: 0
SINUS PRESSURE: 0
CONSTIPATION: 0
ABDOMINAL DISTENTION: 0
NAUSEA: 0
COUGH: 1
SINUS PAIN: 0
SHORTNESS OF BREATH: 1
HEARTBURN: 0
WHEEZING: 0
CHEST TIGHTNESS: 1
SORE THROAT: 0
ABDOMINAL PAIN: 0
RHINORRHEA: 1

## 2019-11-25 DIAGNOSIS — F41.9 ANXIETY: ICD-10-CM

## 2019-11-25 RX ORDER — CLONAZEPAM 1 MG/1
TABLET ORAL
Qty: 60 TABLET | Refills: 2 | Status: SHIPPED | OUTPATIENT
Start: 2019-11-25 | End: 2020-05-07

## 2019-12-02 ENCOUNTER — OFFICE VISIT (OUTPATIENT)
Dept: FAMILY MEDICINE CLINIC | Age: 74
End: 2019-12-02
Payer: MEDICARE

## 2019-12-02 VITALS
HEIGHT: 62 IN | OXYGEN SATURATION: 94 % | BODY MASS INDEX: 16.08 KG/M2 | HEART RATE: 78 BPM | SYSTOLIC BLOOD PRESSURE: 132 MMHG | DIASTOLIC BLOOD PRESSURE: 88 MMHG | TEMPERATURE: 98.4 F | WEIGHT: 87.4 LBS

## 2019-12-02 DIAGNOSIS — R09.81 NASAL CONGESTION: ICD-10-CM

## 2019-12-02 DIAGNOSIS — F17.200 TOBACCO DEPENDENCE: ICD-10-CM

## 2019-12-02 DIAGNOSIS — J40 BRONCHITIS: Primary | ICD-10-CM

## 2019-12-02 PROCEDURE — 99213 OFFICE O/P EST LOW 20 MIN: CPT | Performed by: NURSE PRACTITIONER

## 2019-12-02 RX ORDER — FLUTICASONE PROPIONATE 50 MCG
1 SPRAY, SUSPENSION (ML) NASAL DAILY
Qty: 1 BOTTLE | Refills: 0 | Status: SHIPPED | OUTPATIENT
Start: 2019-12-02 | End: 2020-03-18

## 2019-12-02 RX ORDER — METHYLPREDNISOLONE 4 MG/1
TABLET ORAL
Qty: 1 KIT | Refills: 0 | Status: SHIPPED | OUTPATIENT
Start: 2019-12-02 | End: 2019-12-08

## 2019-12-02 ASSESSMENT — ENCOUNTER SYMPTOMS
RHINORRHEA: 0
CHEST TIGHTNESS: 1
SINUS PRESSURE: 0
WHEEZING: 1
COUGH: 1
NAUSEA: 0
DIARRHEA: 0
SORE THROAT: 0
SINUS PAIN: 0
SHORTNESS OF BREATH: 1
VOMITING: 0

## 2020-01-21 RX ORDER — ALBUTEROL SULFATE 90 UG/1
AEROSOL, METERED RESPIRATORY (INHALATION)
Qty: 54 G | Refills: 3 | Status: SHIPPED | OUTPATIENT
Start: 2020-01-21 | End: 2020-10-13

## 2020-01-30 ENCOUNTER — OFFICE VISIT (OUTPATIENT)
Dept: FAMILY MEDICINE CLINIC | Age: 75
End: 2020-01-30
Payer: MEDICARE

## 2020-01-30 VITALS
DIASTOLIC BLOOD PRESSURE: 82 MMHG | SYSTOLIC BLOOD PRESSURE: 130 MMHG | OXYGEN SATURATION: 92 % | HEART RATE: 101 BPM | HEIGHT: 62 IN | WEIGHT: 87.2 LBS | BODY MASS INDEX: 16.05 KG/M2

## 2020-01-30 PROCEDURE — 99214 OFFICE O/P EST MOD 30 MIN: CPT | Performed by: FAMILY MEDICINE

## 2020-01-30 RX ORDER — MIRTAZAPINE 15 MG/1
15 TABLET, FILM COATED ORAL NIGHTLY
Qty: 30 TABLET | Refills: 3 | Status: SHIPPED | OUTPATIENT
Start: 2020-01-30 | End: 2020-03-16

## 2020-02-02 ASSESSMENT — ENCOUNTER SYMPTOMS
SINUS PRESSURE: 0
CONSTIPATION: 0
SINUS PAIN: 0
DIARRHEA: 0
NAUSEA: 0
VOMITING: 0
SORE THROAT: 0
COUGH: 0
SHORTNESS OF BREATH: 0
BLOOD IN STOOL: 0
ABDOMINAL PAIN: 1

## 2020-02-03 ENCOUNTER — TELEPHONE (OUTPATIENT)
Dept: FAMILY MEDICINE CLINIC | Age: 75
End: 2020-02-03

## 2020-03-16 ENCOUNTER — OFFICE VISIT (OUTPATIENT)
Dept: FAMILY MEDICINE CLINIC | Age: 75
End: 2020-03-16
Payer: MEDICARE

## 2020-03-16 VITALS
TEMPERATURE: 98.8 F | SYSTOLIC BLOOD PRESSURE: 100 MMHG | DIASTOLIC BLOOD PRESSURE: 68 MMHG | BODY MASS INDEX: 16.24 KG/M2 | HEART RATE: 106 BPM | WEIGHT: 88.8 LBS | OXYGEN SATURATION: 90 %

## 2020-03-16 PROCEDURE — 99213 OFFICE O/P EST LOW 20 MIN: CPT | Performed by: FAMILY MEDICINE

## 2020-03-16 RX ORDER — LEVOFLOXACIN 500 MG/1
500 TABLET, FILM COATED ORAL DAILY
Qty: 5 TABLET | Refills: 0 | Status: ON HOLD | OUTPATIENT
Start: 2020-03-16 | End: 2020-03-24 | Stop reason: HOSPADM

## 2020-03-16 ASSESSMENT — ENCOUNTER SYMPTOMS
SINUS PAIN: 0
COUGH: 1
RHINORRHEA: 1
WHEEZING: 1
SORE THROAT: 0
SHORTNESS OF BREATH: 1
SINUS PRESSURE: 0
TROUBLE SWALLOWING: 0

## 2020-03-16 NOTE — PROGRESS NOTES
Neeru Blakely  1945 03/16/20    Chief Complaint   Patient presents with    Cough     Patient states having headache, cough and chest congestion, bodyaches and chills           Patient here c/o:    Cough   This is a new problem. Episode onset: 2 days. The problem has been gradually worsening. The problem occurs every few minutes. The cough is productive of sputum. Associated symptoms include chills, headaches, myalgias, nasal congestion, rhinorrhea, shortness of breath and wheezing. Pertinent negatives include no chest pain, ear congestion, ear pain, fever, postnasal drip or sore throat. Nothing aggravates the symptoms. She has tried a beta-agonist inhaler and OTC cough suppressant for the symptoms. The treatment provided no relief. Her past medical history is significant for COPD.        Past Medical History:   Diagnosis Date    Former smoker 1/5/2018    Hypertension     Moderate COPD (chronic obstructive pulmonary disease) ([facility] [address] 1/5/2018    Other emphysema ([facility] [address] 1/5/2018     Past Surgical History:   Procedure Laterality Date    ABDOMEN SURGERY      CHOLECYSTECTOMY      HYSTERECTOMY      OTHER SURGICAL HISTORY  07/16/2018    exp lap with TUNG     Family History   Problem Relation Age of Onset    Heart Disease Mother     Diabetes Mother     Cancer Father     Diabetes Brother      Social History     Socioeconomic History    Marital status:      Spouse name: Not on file    Number of children: Not on file    Years of education: Not on file    Highest education level: Not on file   Occupational History    Not on file   Social Needs    Financial resource strain: Not on file    Food insecurity     Worry: Not on file     Inability: Not on file    Transportation needs     Medical: Not on file     Non-medical: Not on file   Tobacco Use    Smoking status: Current Every Day Smoker     Packs/day: 0.50     Years: 50.00     Pack years: 25.00     Types: Cigarettes     Last attempt to quit: 2018     Years since quittin.3    Smokeless tobacco: Never Used    Tobacco comment: Off and on smoker pt states. Substance and Sexual Activity    Alcohol use: No    Drug use: No    Sexual activity: Not on file   Lifestyle    Physical activity     Days per week: Not on file     Minutes per session: Not on file    Stress: Not on file   Relationships    Social connections     Talks on phone: Not on file     Gets together: Not on file     Attends Confucianism service: Not on file     Active member of club or organization: Not on file     Attends meetings of clubs or organizations: Not on file     Relationship status: Not on file    Intimate partner violence     Fear of current or ex partner: Not on file     Emotionally abused: Not on file     Physically abused: Not on file     Forced sexual activity: Not on file   Other Topics Concern    Not on file   Social History Narrative    Not on file       Allergies   Allergen Reactions    Pravastatin Sodium Other (See Comments)     Myalgias (note from 5/15/18)     Current Outpatient Medications   Medication Sig Dispense Refill    levoFLOXacin (LEVAQUIN) 500 MG tablet Take 1 tablet by mouth daily for 5 days 5 tablet 0    albuterol sulfate  (90 Base) MCG/ACT inhaler USE 2 INHALATIONS ORALLY INTO THE LUNGS EVERY 6 HOURS AS NEEDED FOR WHEEZING OR SHORTNESS OF BREATH 54 g 3    fluticasone (FLONASE) 50 MCG/ACT nasal spray 1 spray by Nasal route daily 1 Bottle 0    pantoprazole (PROTONIX) 40 MG tablet Take 40 mg by mouth daily      traZODone (DESYREL) 50 MG tablet Take 1 tablet by mouth nightly 90 tablet 3    montelukast (SINGULAIR) 10 MG tablet Take 1 tablet by mouth nightly 90 tablet 3    melatonin 5 MG TABS tablet Take 5 mg by mouth nightly      aspirin 81 MG tablet Take 81 mg by mouth daily.       clonazePAM (KLONOPIN) 1 MG tablet TAKE 1 TABLET TWICE A DAY  AS NEEDED FOR ANXIETY 60 tablet 2     No current facility-administered medications for 132/88       Wt Readings from Last 3 Encounters:   03/16/20 88 lb 12.8 oz (40.3 kg)   01/30/20 87 lb 3.2 oz (39.6 kg)   12/02/19 87 lb 6.4 oz (39.6 kg)         Physical Exam  Constitutional:       General: She is not in acute distress. Appearance: Normal appearance. She is not ill-appearing. HENT:      Head: Normocephalic and atraumatic. Right Ear: Tympanic membrane, ear canal and external ear normal. There is no impacted cerumen. Left Ear: Tympanic membrane, ear canal and external ear normal. There is no impacted cerumen. Nose: Congestion present. Mouth/Throat:      Mouth: Mucous membranes are moist.      Pharynx: Posterior oropharyngeal erythema present. No oropharyngeal exudate. Eyes:      General: No scleral icterus. Extraocular Movements: Extraocular movements intact. Pupils: Pupils are equal, round, and reactive to light. Neck:      Musculoskeletal: Normal range of motion and neck supple. Cardiovascular:      Rate and Rhythm: Normal rate and regular rhythm. Heart sounds: No murmur. Pulmonary:      Effort: Pulmonary effort is normal.      Breath sounds: Decreased air movement present. Wheezing, rhonchi and rales present. Musculoskeletal: Normal range of motion. Neurological:      Mental Status: She is alert and oriented to person, place, and time. Psychiatric:         Mood and Affect: Mood normal.         Behavior: Behavior normal.         ASSESSMENT/ PLAN:    1. Acute bronchitis, unspecified organism  - levoFLOXacin (LEVAQUIN) 500 MG tablet; Take 1 tablet by mouth daily for 5 days  Dispense: 5 tablet; Refill: 0  - use the inhaler              - Appropriateprescription are addressed. - After visit summery provided. - Questions answered and patient verbalizes understanding.  - Call for any problem, questions, or concerns. Return if symptoms worsen or fail to improve.

## 2020-03-18 ENCOUNTER — HOSPITAL ENCOUNTER (INPATIENT)
Age: 75
LOS: 6 days | Discharge: HOME | DRG: 190 | End: 2020-03-24
Attending: EMERGENCY MEDICINE | Admitting: HOSPITALIST
Payer: MEDICARE

## 2020-03-18 ENCOUNTER — APPOINTMENT (OUTPATIENT)
Dept: GENERAL RADIOLOGY | Age: 75
DRG: 190 | End: 2020-03-18
Payer: MEDICARE

## 2020-03-18 ENCOUNTER — APPOINTMENT (OUTPATIENT)
Dept: CT IMAGING | Age: 75
DRG: 190 | End: 2020-03-18
Payer: MEDICARE

## 2020-03-18 ENCOUNTER — TELEPHONE (OUTPATIENT)
Dept: FAMILY MEDICINE CLINIC | Age: 75
End: 2020-03-18

## 2020-03-18 PROBLEM — J96.21 ACUTE ON CHRONIC RESPIRATORY FAILURE WITH HYPOXIA (HCC): Status: ACTIVE | Noted: 2020-03-18

## 2020-03-18 LAB
ADENOVIRUS DETECTION BY PCR: NOT DETECTED
ALBUMIN SERPL-MCNC: 4 GM/DL (ref 3.4–5)
ALP BLD-CCNC: 96 IU/L (ref 40–129)
ALT SERPL-CCNC: 15 U/L (ref 10–40)
ANION GAP SERPL CALCULATED.3IONS-SCNC: 11 MMOL/L (ref 4–16)
AST SERPL-CCNC: 32 IU/L (ref 15–37)
BACTERIA: NEGATIVE /HPF
BANDED NEUTROPHILS ABSOLUTE COUNT: 0.32 K/CU MM
BANDED NEUTROPHILS RELATIVE PERCENT: 6 % (ref 5–11)
BASE EXCESS MIXED: ABNORMAL (ref 0–2.3)
BILIRUB SERPL-MCNC: 0.4 MG/DL (ref 0–1)
BILIRUBIN URINE: NEGATIVE MG/DL
BLOOD, URINE: NEGATIVE
BORDETELLA PERTUSSIS PCR: NOT DETECTED
BUN BLDV-MCNC: 15 MG/DL (ref 6–23)
CALCIUM SERPL-MCNC: 8.7 MG/DL (ref 8.3–10.6)
CHLAMYDOPHILA PNEUMONIA PCR: NOT DETECTED
CHLORIDE BLD-SCNC: 95 MMOL/L (ref 99–110)
CLARITY: CLEAR
CO2: 30 MMOL/L (ref 21–32)
COLOR: ABNORMAL
COMMENT: ABNORMAL
CORONAVIRUS 229E PCR: NOT DETECTED
CORONAVIRUS HKU1 PCR: NOT DETECTED
CORONAVIRUS NL63 PCR: NOT DETECTED
CORONAVIRUS OC43 PCR: NOT DETECTED
CREAT SERPL-MCNC: 0.8 MG/DL (ref 0.6–1.1)
DIFFERENTIAL TYPE: ABNORMAL
EOSINOPHILS ABSOLUTE: 0.1 K/CU MM
EOSINOPHILS RELATIVE PERCENT: 1 % (ref 0–3)
GFR AFRICAN AMERICAN: >60 ML/MIN/1.73M2
GFR NON-AFRICAN AMERICAN: >60 ML/MIN/1.73M2
GLUCOSE BLD-MCNC: 98 MG/DL (ref 70–99)
GLUCOSE, URINE: NEGATIVE MG/DL
HCO3 VENOUS: 31.2 MMOL/L (ref 19–25)
HCT VFR BLD CALC: 44.3 % (ref 37–47)
HEMOGLOBIN: 14.1 GM/DL (ref 12.5–16)
HUMAN METAPNEUMOVIRUS PCR: ABNORMAL
INFLUENZA A BY PCR: NOT DETECTED
INFLUENZA A H1 (2009) PCR: NOT DETECTED
INFLUENZA A H1 PANDEMIC PCR: NOT DETECTED
INFLUENZA A H3 PCR: NOT DETECTED
INFLUENZA B BY PCR: NOT DETECTED
KETONES, URINE: NEGATIVE MG/DL
LACTATE: 0.8 MMOL/L (ref 0.4–2)
LEUKOCYTE ESTERASE, URINE: NEGATIVE
LYMPHOCYTES ABSOLUTE: 1.5 K/CU MM
LYMPHOCYTES RELATIVE PERCENT: 27 % (ref 24–44)
MCH RBC QN AUTO: 29.7 PG (ref 27–31)
MCHC RBC AUTO-ENTMCNC: 31.8 % (ref 32–36)
MCV RBC AUTO: 93.3 FL (ref 78–100)
MONOCYTES ABSOLUTE: 0.8 K/CU MM
MONOCYTES RELATIVE PERCENT: 14 % (ref 0–4)
MYCOPLASMA PNEUMONIAE PCR: NOT DETECTED
NITRITE URINE, QUANTITATIVE: NEGATIVE
O2 SAT, VEN: 92.3 % (ref 50–70)
PARAINFLUENZA 1 PCR: NOT DETECTED
PARAINFLUENZA 2 PCR: NOT DETECTED
PARAINFLUENZA 3 PCR: NOT DETECTED
PARAINFLUENZA 4 PCR: NOT DETECTED
PCO2, VEN: 54 MMHG (ref 38–52)
PDW BLD-RTO: 14.2 % (ref 11.7–14.9)
PH VENOUS: 7.37 (ref 7.32–7.42)
PH, URINE: 6 (ref 5–8)
PLATELET # BLD: 143 K/CU MM (ref 140–440)
PMV BLD AUTO: 10.7 FL (ref 7.5–11.1)
PO2, VEN: 122 MMHG (ref 28–48)
POTASSIUM SERPL-SCNC: 4.1 MMOL/L (ref 3.5–5.1)
PRO-BNP: 71.8 PG/ML
PROTEIN UA: NEGATIVE MG/DL
RAPID INFLUENZA  B AGN: NEGATIVE
RAPID INFLUENZA A AGN: NEGATIVE
RBC # BLD: 4.75 M/CU MM (ref 4.2–5.4)
RBC # BLD: ABNORMAL 10*6/UL
RBC URINE: ABNORMAL /HPF (ref 0–6)
RHINOVIRUS ENTEROVIRUS PCR: NOT DETECTED
RSV PCR: NOT DETECTED
SEGMENTED NEUTROPHILS ABSOLUTE COUNT: 2.7 K/CU MM
SEGMENTED NEUTROPHILS RELATIVE PERCENT: 52 % (ref 36–66)
SODIUM BLD-SCNC: 136 MMOL/L (ref 135–145)
SPECIFIC GRAVITY UA: 1 (ref 1–1.03)
SQUAMOUS EPITHELIAL: <1 /HPF
TOTAL PROTEIN: 6.2 GM/DL (ref 6.4–8.2)
TRICHOMONAS: ABNORMAL /HPF
TROPONIN T: <0.01 NG/ML
UROBILINOGEN, URINE: NORMAL MG/DL (ref 0.2–1)
WBC # BLD: 5.4 K/CU MM (ref 4–10.5)
WBC UA: <1 /HPF (ref 0–5)

## 2020-03-18 PROCEDURE — 83605 ASSAY OF LACTIC ACID: CPT

## 2020-03-18 PROCEDURE — 83880 ASSAY OF NATRIURETIC PEPTIDE: CPT

## 2020-03-18 PROCEDURE — 93005 ELECTROCARDIOGRAM TRACING: CPT | Performed by: PHYSICIAN ASSISTANT

## 2020-03-18 PROCEDURE — 87633 RESP VIRUS 12-25 TARGETS: CPT

## 2020-03-18 PROCEDURE — ~ 87635 HC SO COVID-19 ANY TECHNIQUE NON-CDC

## 2020-03-18 PROCEDURE — 6370000000 HC RX 637 (ALT 250 FOR IP): Performed by: PHYSICIAN ASSISTANT

## 2020-03-18 PROCEDURE — 87899 AGENT NOS ASSAY W/OPTIC: CPT

## 2020-03-18 PROCEDURE — 6360000002 HC RX W HCPCS: Performed by: PHYSICIAN ASSISTANT

## 2020-03-18 PROCEDURE — U0002 COVID-19 LAB TEST NON-CDC: HCPCS

## 2020-03-18 PROCEDURE — 6370000000 HC RX 637 (ALT 250 FOR IP): Performed by: NURSE PRACTITIONER

## 2020-03-18 PROCEDURE — 81001 URINALYSIS AUTO W/SCOPE: CPT

## 2020-03-18 PROCEDURE — 1200000000 HC SEMI PRIVATE

## 2020-03-18 PROCEDURE — 6360000002 HC RX W HCPCS: Performed by: NURSE PRACTITIONER

## 2020-03-18 PROCEDURE — 71045 X-RAY EXAM CHEST 1 VIEW: CPT

## 2020-03-18 PROCEDURE — 87804 INFLUENZA ASSAY W/OPTIC: CPT

## 2020-03-18 PROCEDURE — 87449 NOS EACH ORGANISM AG IA: CPT

## 2020-03-18 PROCEDURE — 87581 M.PNEUMON DNA AMP PROBE: CPT

## 2020-03-18 PROCEDURE — 2580000003 HC RX 258: Performed by: PHYSICIAN ASSISTANT

## 2020-03-18 PROCEDURE — 2700000000 HC OXYGEN THERAPY PER DAY

## 2020-03-18 PROCEDURE — 93010 ELECTROCARDIOGRAM REPORT: CPT | Performed by: INTERNAL MEDICINE

## 2020-03-18 PROCEDURE — 87798 DETECT AGENT NOS DNA AMP: CPT

## 2020-03-18 PROCEDURE — 84484 ASSAY OF TROPONIN QUANT: CPT

## 2020-03-18 PROCEDURE — 85007 BL SMEAR W/DIFF WBC COUNT: CPT

## 2020-03-18 PROCEDURE — 96365 THER/PROPH/DIAG IV INF INIT: CPT

## 2020-03-18 PROCEDURE — 82805 BLOOD GASES W/O2 SATURATION: CPT

## 2020-03-18 PROCEDURE — 96375 TX/PRO/DX INJ NEW DRUG ADDON: CPT

## 2020-03-18 PROCEDURE — 94761 N-INVAS EAR/PLS OXIMETRY MLT: CPT

## 2020-03-18 PROCEDURE — 94640 AIRWAY INHALATION TREATMENT: CPT

## 2020-03-18 PROCEDURE — 87486 CHLMYD PNEUM DNA AMP PROBE: CPT

## 2020-03-18 PROCEDURE — 85027 COMPLETE CBC AUTOMATED: CPT

## 2020-03-18 PROCEDURE — 99285 EMERGENCY DEPT VISIT HI MDM: CPT

## 2020-03-18 PROCEDURE — 71250 CT THORAX DX C-: CPT

## 2020-03-18 PROCEDURE — 87040 BLOOD CULTURE FOR BACTERIA: CPT

## 2020-03-18 PROCEDURE — 96367 TX/PROPH/DG ADDL SEQ IV INF: CPT

## 2020-03-18 PROCEDURE — 80053 COMPREHEN METABOLIC PANEL: CPT

## 2020-03-18 PROCEDURE — 2580000003 HC RX 258: Performed by: NURSE PRACTITIONER

## 2020-03-18 RX ORDER — PROMETHAZINE HYDROCHLORIDE 25 MG/1
12.5 TABLET ORAL EVERY 6 HOURS PRN
Status: DISCONTINUED | OUTPATIENT
Start: 2020-03-18 | End: 2020-03-24 | Stop reason: HOSPADM

## 2020-03-18 RX ORDER — SODIUM CHLORIDE 0.9 % (FLUSH) 0.9 %
10 SYRINGE (ML) INJECTION EVERY 12 HOURS SCHEDULED
Status: DISCONTINUED | OUTPATIENT
Start: 2020-03-18 | End: 2020-03-24 | Stop reason: HOSPADM

## 2020-03-18 RX ORDER — PANTOPRAZOLE SODIUM 40 MG/1
40 TABLET, DELAYED RELEASE ORAL DAILY
Status: DISCONTINUED | OUTPATIENT
Start: 2020-03-18 | End: 2020-03-24 | Stop reason: HOSPADM

## 2020-03-18 RX ORDER — LORATADINE 10 MG/1
10 TABLET ORAL DAILY
COMMUNITY
End: 2020-10-29

## 2020-03-18 RX ORDER — 0.9 % SODIUM CHLORIDE 0.9 %
1000 INTRAVENOUS SOLUTION INTRAVENOUS ONCE
Status: COMPLETED | OUTPATIENT
Start: 2020-03-18 | End: 2020-03-18

## 2020-03-18 RX ORDER — MONTELUKAST SODIUM 10 MG/1
10 TABLET ORAL NIGHTLY
Status: DISCONTINUED | OUTPATIENT
Start: 2020-03-18 | End: 2020-03-24 | Stop reason: HOSPADM

## 2020-03-18 RX ORDER — METHYLPREDNISOLONE SODIUM SUCCINATE 125 MG/2ML
125 INJECTION, POWDER, LYOPHILIZED, FOR SOLUTION INTRAMUSCULAR; INTRAVENOUS ONCE
Status: COMPLETED | OUTPATIENT
Start: 2020-03-18 | End: 2020-03-18

## 2020-03-18 RX ORDER — LANOLIN ALCOHOL/MO/W.PET/CERES
6 CREAM (GRAM) TOPICAL NIGHTLY
Status: DISCONTINUED | OUTPATIENT
Start: 2020-03-18 | End: 2020-03-24 | Stop reason: HOSPADM

## 2020-03-18 RX ORDER — ALBUTEROL SULFATE 90 UG/1
2 AEROSOL, METERED RESPIRATORY (INHALATION) ONCE
Status: COMPLETED | OUTPATIENT
Start: 2020-03-18 | End: 2020-03-18

## 2020-03-18 RX ORDER — POLYETHYLENE GLYCOL 3350 17 G/17G
17 POWDER, FOR SOLUTION ORAL DAILY PRN
Status: DISCONTINUED | OUTPATIENT
Start: 2020-03-18 | End: 2020-03-24 | Stop reason: HOSPADM

## 2020-03-18 RX ORDER — HEPARIN SODIUM 5000 [USP'U]/ML
5000 INJECTION, SOLUTION INTRAVENOUS; SUBCUTANEOUS 2 TIMES DAILY
Status: DISCONTINUED | OUTPATIENT
Start: 2020-03-18 | End: 2020-03-24 | Stop reason: HOSPADM

## 2020-03-18 RX ORDER — ONDANSETRON 2 MG/ML
4 INJECTION INTRAMUSCULAR; INTRAVENOUS EVERY 6 HOURS PRN
Status: DISCONTINUED | OUTPATIENT
Start: 2020-03-18 | End: 2020-03-24 | Stop reason: HOSPADM

## 2020-03-18 RX ORDER — TRAZODONE HYDROCHLORIDE 50 MG/1
50 TABLET ORAL NIGHTLY
Status: DISCONTINUED | OUTPATIENT
Start: 2020-03-18 | End: 2020-03-24 | Stop reason: HOSPADM

## 2020-03-18 RX ORDER — METHYLPREDNISOLONE SODIUM SUCCINATE 40 MG/ML
40 INJECTION, POWDER, LYOPHILIZED, FOR SOLUTION INTRAMUSCULAR; INTRAVENOUS EVERY 6 HOURS
Status: COMPLETED | OUTPATIENT
Start: 2020-03-18 | End: 2020-03-20

## 2020-03-18 RX ORDER — ACETAMINOPHEN 325 MG/1
650 TABLET ORAL EVERY 6 HOURS PRN
Status: DISCONTINUED | OUTPATIENT
Start: 2020-03-18 | End: 2020-03-24 | Stop reason: HOSPADM

## 2020-03-18 RX ORDER — SODIUM CHLORIDE 9 MG/ML
INJECTION, SOLUTION INTRAVENOUS CONTINUOUS
Status: DISCONTINUED | OUTPATIENT
Start: 2020-03-18 | End: 2020-03-23

## 2020-03-18 RX ORDER — ACETAMINOPHEN 650 MG/1
650 SUPPOSITORY RECTAL EVERY 6 HOURS PRN
Status: DISCONTINUED | OUTPATIENT
Start: 2020-03-18 | End: 2020-03-24 | Stop reason: HOSPADM

## 2020-03-18 RX ORDER — CETIRIZINE HYDROCHLORIDE 10 MG/1
10 TABLET ORAL DAILY
Status: DISCONTINUED | OUTPATIENT
Start: 2020-03-18 | End: 2020-03-24 | Stop reason: HOSPADM

## 2020-03-18 RX ORDER — PREDNISONE 20 MG/1
40 TABLET ORAL DAILY
Status: DISCONTINUED | OUTPATIENT
Start: 2020-03-21 | End: 2020-03-24

## 2020-03-18 RX ORDER — SODIUM CHLORIDE 0.9 % (FLUSH) 0.9 %
10 SYRINGE (ML) INJECTION PRN
Status: DISCONTINUED | OUTPATIENT
Start: 2020-03-18 | End: 2020-03-24 | Stop reason: HOSPADM

## 2020-03-18 RX ORDER — ASPIRIN 81 MG/1
81 TABLET ORAL DAILY
Status: DISCONTINUED | OUTPATIENT
Start: 2020-03-18 | End: 2020-03-24 | Stop reason: HOSPADM

## 2020-03-18 RX ADMIN — METHYLPREDNISOLONE SODIUM SUCCINATE 40 MG: 40 INJECTION, POWDER, FOR SOLUTION INTRAMUSCULAR; INTRAVENOUS at 15:37

## 2020-03-18 RX ADMIN — METHYLPREDNISOLONE SODIUM SUCCINATE 125 MG: 125 INJECTION, POWDER, FOR SOLUTION INTRAMUSCULAR; INTRAVENOUS at 11:51

## 2020-03-18 RX ADMIN — ALBUTEROL SULFATE 2 PUFF: 90 AEROSOL, METERED RESPIRATORY (INHALATION) at 11:31

## 2020-03-18 RX ADMIN — MONTELUKAST 10 MG: 10 TABLET, FILM COATED ORAL at 20:49

## 2020-03-18 RX ADMIN — SODIUM CHLORIDE, PRESERVATIVE FREE 10 ML: 5 INJECTION INTRAVENOUS at 20:49

## 2020-03-18 RX ADMIN — METHYLPREDNISOLONE SODIUM SUCCINATE 40 MG: 40 INJECTION, POWDER, FOR SOLUTION INTRAMUSCULAR; INTRAVENOUS at 20:48

## 2020-03-18 RX ADMIN — CETIRIZINE HYDROCHLORIDE 10 MG: 10 TABLET, FILM COATED ORAL at 15:37

## 2020-03-18 RX ADMIN — AZITHROMYCIN DIHYDRATE 500 MG: 500 INJECTION, POWDER, LYOPHILIZED, FOR SOLUTION INTRAVENOUS at 12:34

## 2020-03-18 RX ADMIN — PANTOPRAZOLE SODIUM 40 MG: 40 TABLET, DELAYED RELEASE ORAL at 15:37

## 2020-03-18 RX ADMIN — TRAZODONE HYDROCHLORIDE 50 MG: 50 TABLET ORAL at 20:51

## 2020-03-18 RX ADMIN — SODIUM CHLORIDE 1000 ML: 9 INJECTION, SOLUTION INTRAVENOUS at 11:51

## 2020-03-18 RX ADMIN — CEFTRIAXONE SODIUM 1 G: 1 INJECTION, POWDER, FOR SOLUTION INTRAMUSCULAR; INTRAVENOUS at 11:51

## 2020-03-18 RX ADMIN — ASPIRIN 81 MG: 81 TABLET, COATED ORAL at 15:36

## 2020-03-18 RX ADMIN — SODIUM CHLORIDE: 9 INJECTION, SOLUTION INTRAVENOUS at 15:36

## 2020-03-18 RX ADMIN — MELATONIN 6 MG: at 20:51

## 2020-03-18 ASSESSMENT — PAIN SCALES - GENERAL
PAINLEVEL_OUTOF10: 4
PAINLEVEL_OUTOF10: 0

## 2020-03-18 ASSESSMENT — PAIN DESCRIPTION - PAIN TYPE: TYPE: ACUTE PAIN

## 2020-03-18 ASSESSMENT — PAIN DESCRIPTION - DESCRIPTORS: DESCRIPTORS: ACHING

## 2020-03-18 NOTE — PROGRESS NOTES
Medication History  [facility]    Patient Name: Sharron Lai 1945     Medication history has been completed by: Shayla Theodore CPhT    Source(s) of information: patient and insurance claims     Primary Care Physician: Yancy Mckeon MD     Pharmacy: [facility]    Allergies as of 03/18/2020 - Review Complete 03/18/2020   Allergen Reaction Noted    Pravastatin sodium Other (See Comments) 09/13/2018        Prior to Admission medications    Medication Sig Start Date End Date Taking? Authorizing Provider   loratadine (EQ ALLERGY RELIEF) 10 MG tablet Take 10 mg by mouth daily   Yes Historical Provider, MD   sodium chloride (OCEAN, BABY AYR) 0.65 % nasal spray 1 spray by Nasal route as needed for Congestion   Yes Historical Provider, MD   levoFLOXacin (LEVAQUIN) 500 MG tablet Take 1 tablet by mouth daily for 5 days 3/16/20 3/21/20 Yes Yancy Mckeon MD   albuterol sulfate  (90 Base) MCG/ACT inhaler USE 2 INHALATIONS ORALLY INTO THE LUNGS EVERY 6 HOURS AS NEEDED FOR WHEEZING OR SHORTNESS OF BREATH 1/21/20  Yes Yancy Mckeon MD   clonazePAM (KLONOPIN) 1 MG tablet TAKE 1 TABLET TWICE A DAY  AS NEEDED FOR ANXIETY 11/25/19  Yes Yancy Mckeon MD   pantoprazole (PROTONIX) 40 MG tablet Take 40 mg by mouth daily   Yes Historical Provider, MD   traZODone (DESYREL) 50 MG tablet Take 1 tablet by mouth nightly 4/24/19  Yes Yancy Mckeon MD   montelukast (SINGULAIR) 10 MG tablet Take 1 tablet by mouth nightly 4/24/19  Yes Yancy Mckeon MD   melatonin 5 MG TABS tablet Take 5 mg by mouth nightly   Yes Historical Provider, MD   aspirin 81 MG tablet Take 81 mg by mouth daily. Yes Historical Provider, MD     Medications added or changed (ex.  new medication, dosage change, interval change, formulation change):  Loratadine (added)  Saline nasal spray (added)    Medications removed from list (include reason, ex. noncompliance, medication cost, therapy complete etc.):   Fluticasone nasal spray patient reports she is not using this    Comments:  Medication list reviewed with patient and insurance claims verified. Patient reports she has taken first dose of medications today. Recent claims for Mirtazapine patient states she is not taking this.     To my knowledge the above medication history is accurate as of 3/18/2020 12:04 PM.   Alex Gill CPhT   3/18/2020 12:04 PM

## 2020-03-18 NOTE — ED NOTES
Pt given lamberto crackers per request and per ok from [address] Pt denies further needs. CCM and CPOX in place. Call light in reach. Will cont. To monitor.      Daniella Garnett RN  03/18/20 4416

## 2020-03-18 NOTE — ED NOTES
Pt to ED for c.o cough and SOB. Pt recently started on levaquin by PCP for bronchitis. Pt reports cough and SOB continues to worse. Also reports body aches. Pt a&ox4. Speaking shortened sentences. Pt 86% RA on arrival. Pt does not use any home oxygen. Pt placed on 4L NC and improves to 97-98%. TABATHA Deshpande made aware and states to move pt to main treatment.       Nyasia Bergman RN  03/18/20 0815

## 2020-03-18 NOTE — ED PROVIDER NOTES
ABDOMEN SURGERY      CHOLECYSTECTOMY      HYSTERECTOMY      OTHER SURGICAL HISTORY  2018    exp lap with TUNG       CURRENT MEDICATIONS    Current Outpatient Rx   Medication Sig Dispense Refill    levoFLOXacin (LEVAQUIN) 500 MG tablet Take 1 tablet by mouth daily for 5 days 5 tablet 0    albuterol sulfate  (90 Base) MCG/ACT inhaler USE 2 INHALATIONS ORALLY INTO THE LUNGS EVERY 6 HOURS AS NEEDED FOR WHEEZING OR SHORTNESS OF BREATH 54 g 3    fluticasone (FLONASE) 50 MCG/ACT nasal spray 1 spray by Nasal route daily 1 Bottle 0    clonazePAM (KLONOPIN) 1 MG tablet TAKE 1 TABLET TWICE A DAY  AS NEEDED FOR ANXIETY 60 tablet 2    pantoprazole (PROTONIX) 40 MG tablet Take 40 mg by mouth daily      traZODone (DESYREL) 50 MG tablet Take 1 tablet by mouth nightly 90 tablet 3    montelukast (SINGULAIR) 10 MG tablet Take 1 tablet by mouth nightly 90 tablet 3    melatonin 5 MG TABS tablet Take 5 mg by mouth nightly      aspirin 81 MG tablet Take 81 mg by mouth daily. ALLERGIES    Allergies   Allergen Reactions    Pravastatin Sodium Other (See Comments)     Myalgias (note from 5/15/18)       SOCIAL & FAMILY HISTORY    Social History     Socioeconomic History    Marital status:      Spouse name: None    Number of children: None    Years of education: None    Highest education level: None   Occupational History    None   Social Needs    Financial resource strain: None    Food insecurity     Worry: None     Inability: None    Transportation needs     Medical: None     Non-medical: None   Tobacco Use    Smoking status: Current Every Day Smoker     Packs/day: 0.50     Years: 50.00     Pack years: 25.00     Types: Cigarettes     Last attempt to quit: 2018     Years since quittin.3    Smokeless tobacco: Never Used    Tobacco comment: Off and on smoker pt states.    Substance and Sexual Activity    Alcohol use: No    Drug use: No    Sexual activity: None   Lifestyle    Physical activity     Days per week: None     Minutes per session: None    Stress: None   Relationships    Social connections     Talks on phone: None     Gets together: None     Attends Protestant service: None     Active member of club or organization: None     Attends meetings of clubs or organizations: None     Relationship status: None    Intimate partner violence     Fear of current or ex partner: None     Emotionally abused: None     Physically abused: None     Forced sexual activity: None   Other Topics Concern    None   Social History Narrative    None     Family History   Problem Relation Age of Onset    Heart Disease Mother     Diabetes Mother     Cancer Father     Diabetes Brother        PHYSICAL EXAM    VITAL SIGNS: BP (!) 147/87   Pulse 99   Temp 98.6 °F (37 °C) (Oral)   Resp 20   Ht 5' 2\" (1.575 m)   Wt 88 lb (39.9 kg)   SpO2 97%   BMI 16.10 kg/m²    Constitutional:  Well developed, well nourished, no acute distress   HENT:  Atraumatic, moist mucus membranes  Neck/Lymphatics: supple, no JVD, no swollen nodes  Respiratory:   Nonlabored breathing. Rate 20. Lungs diminished, no significant rhonchi or wheezing. No rales. No obvious retractions. No accessory muscle use. Able to talk in full sentences   Cardiovascular:  Rate tachycardic, normal Rhythm,  no murmurs/rubs/gallops. No carotid bruits or murmurs heard in carotids. No JVD  GI:  Soft, nontender, normal bowel sounds  Musculoskeletal:    There is no edema, asymmetry, or calf / thigh tenderness bilaterally. No cyanosis. No cool or pale-appearing limb.   Distal cap refill and pulses intact bilateral upper and lower extremities  Bilateral upper and lower extremity ROM intact without pain or obvious deficit  Integument:  Skin is warm and dry, no petechiae   Neurologic:  Alert & oriented, no slurred speech  Psych: Pleasant affect, no hallucinations    EKG    EKG 12 Lead   Result Value Ref Range    Ventricular Rate 90 BPM Atrial Rate 90 BPM    P-R Interval 120 ms    QRS Duration 68 ms    Q-T Interval 344 ms    QTc Calculation (Bazett) 420 ms    P Axis 76 degrees    R Axis 83 degrees    T Axis 70 degrees    Diagnosis       Normal sinus rhythm  Possible Left atrial enlargement  Septal infarct , age undetermined  Abnormal ECG  No previous ECGs available  Confirmed by Desi Scott MD, Demetrio Ruiz ([id number]) on 3/18/2020 2:03:20 PM           LABS:  Results for orders placed or performed during the hospital encounter of 03/18/20   Rapid Flu Swab   Result Value Ref Range    Rapid Influenza A Ag NEGATIVE NEGATIVE    Rapid Influenza B Ag NEGATIVE NEGATIVE   CBC auto diff   Result Value Ref Range    WBC 5.4 4.0 - 10.5 K/CU MM    RBC 4.75 4.2 - 5.4 M/CU MM    Hemoglobin 14.1 12.5 - 16.0 GM/DL    Hematocrit 44.3 37 - 47 %    MCV 93.3 78 - 100 FL    MCH 29.7 27 - 31 PG    MCHC 31.8 (L) 32.0 - 36.0 %    RDW 14.2 11.7 - 14.9 %    Platelets 539 050 - 435 K/CU MM    MPV 10.7 7.5 - 11.1 FL    Bands Relative 6 5 - 11 %    Segs Relative 52.0 36 - 66 %    Eosinophils % 1.0 0 - 3 %    Lymphocytes % 27.0 24 - 44 %    Monocytes % 14.0 (H) 0 - 4 %    Bands Absolute 0.32 K/CU MM    Segs Absolute 2.7 K/CU MM    Eosinophils Absolute 0.1 K/CU MM    Lymphocytes Absolute 1.5 K/CU MM    Monocytes Absolute 0.8 K/CU MM    Differential Type MANUAL DIFFERENTIAL     RBC Morphology SLIGHT  ANISOCYTOSIS      CMP   Result Value Ref Range    Sodium 136 135 - 145 MMOL/L    Potassium 4.1 3.5 - 5.1 MMOL/L    Chloride 95 (L) 99 - 110 mMol/L    CO2 30 21 - 32 MMOL/L    BUN 15 6 - 23 MG/DL    CREATININE 0.8 0.6 - 1.1 MG/DL    Glucose 98 70 - 99 MG/DL    Calcium 8.7 8.3 - 10.6 MG/DL    Alb 4.0 3.4 - 5.0 GM/DL    Total Protein 6.2 (L) 6.4 - 8.2 GM/DL    Total Bilirubin 0.4 0.0 - 1.0 MG/DL    ALT 15 10 - 40 U/L    AST 32 15 - 37 IU/L    Alkaline Phosphatase 96 40 - 129 IU/L    GFR Non-African American >60 >60 mL/min/1.73m2    GFR African American >60 >60 mL/min/1.73m2    Anion Gap 11 4 - 16 fever Acuity: Acute Type of Exam: Initial FINDINGS: No focal infiltrate, pleural effusion or pneumothorax is demonstrated. The heart is normal in size. There are bilateral breast prostheses. No acute osseous abnormality is seen. No acute cardiopulmonary disease     ED COURSE & MEDICAL DECISION MAKING      Patient presents as above. Emergent etiologies considered. Patient initially mid 80s oxygen on room air. Did improve with oxygenation. Was on 4 L. Patient state has worsening fever cough, recently treated with outpatient and a biotics. No signs of respiratory distress. Denies chest pain. Broad work-up initiated. Work-up otherwise benign. EKG, cardiac testing within normal limits. Chest x-ray negative. No significant signs of sepsis developing. Patient did improve with breathing treatments, steroids, fluids. Still requiring oxygen. At this point will admit for likely COPD exacerbation for further evaluation. Patient was staffed with Dr. Freya Newman      The [address] time is 20 minutes which excludes separately billable procedures. For empiric antibiotics, breathing treatment, steroid, close monitoring of patient's respiratory status. Vital signs and nursing notes reviewed during ED course. All pertinent Lab data and radiographic results reviewed with patient at bedside. The patient and/or the family were informed of the results of any tests/labs/imaging, the treatment plan, and time was allotted to answer questions. Clinical  IMPRESSION    1. Dyspnea, unspecified type    2. COPD exacerbation ([facility] [address]        Comment: Please note this report has been produced using speech recognition software and may contain errors related to that system including errors in grammar, punctuation, and spelling, as well as words and phrases that may be inappropriate. If there are any questions or concerns please feel free to contact the dictating provider for clarification. Omkar Lynch 411, PA  03/18/20 1435

## 2020-03-18 NOTE — H&P
tingling. PSYCH:  Denies any sleeping problems, history of abuse, marital discord. HEME/LYMPHATIC/IMMUNO:  Denies , bruising, bleeding abnormalities   ENDO:  Denies any heat or cold intolerance, panemiaolyuria or polydipsia. Objective:   No intake or output data in the 24 hours ending 03/18/20 1417   Vitals:   Vitals:    03/18/20 1333   BP: (!) 104/59   Pulse: 83   Resp: 19   Temp:    SpO2: 100%     Physical Exam:   Gen:  awake, alert, cooperative, no apparent distress. Cachectic  EYES:Lids and lashes normal, pupils equal, round ,extra ocular muscles intact, sclera clear, conjunctiva normal  ENT:  Normocephalic, oral pharynx with moist mucus membranes  NECK:  Supple, symmetrical, trachea midline, no adenopathy,  LUNGS:  Diminished bilaterally with diffused wheezing noted. Hypoxia  CARDIOVASCULAR:  Tachycardic, normal S1 and S2,no murmur noted, peripheral pulses 2+, no pitting edema  ABDOMEN: Normal BS, Non tender, non distended, no HSM noted. MUSCULOSKELETAL:  ROM of all extremities grossly wnl  NEUROLOGIC: AOx 3,  Cranial nerves II-XII are grossly intact. Motor is 5 out of 5 bilaterally. Sensory is intact, no lateralizing findings. SKIN:  no bruising or bleeding, normal skin color, turgor, no redness, warmth, or swelling      Past Medical History:      Past Medical History:   Diagnosis Date    Former smoker 1/5/2018    Hypertension     Moderate COPD (chronic obstructive pulmonary disease) ([address] 1/5/2018    Other emphysema ([facility] [address] 1/5/2018     PSHX:  has a past surgical history that includes Hysterectomy; Cholecystectomy; Abdomen surgery; and other surgical history (07/16/2018). Allergies: Allergies   Allergen Reactions    Pravastatin Sodium Other (See Comments)     Myalgias (note from 5/15/18)       FAM HX: family history includes Cancer in her father; Diabetes in her brother and mother; Heart Disease in her mother. Family history reviewed and is essentially negative unless as stated above.

## 2020-03-18 NOTE — ED PROVIDER NOTES
pain nausea medicine as needed IV fluids antibiotics will admit. Patient given a mask. Isolation precautions PP E worn. Patient will be admitted to the hospital for COPD exacerbation hypoxia. So far work-up negative. Awaiting CT PE study otherwise labs negative given breathing treatment steroids cough medicine antibiotics patient minute. Did do COVID 19 testing      12 lead EKG per my interpretation:  Normal Sinus Rhythm 90  Axis is   Normal  QTc is  420  There is no specific T wave changes appreciated. There is no specific ST wave changes appreciated. Prior EKG to compare with was not available       All diagnostic, treatment, and disposition decisions were made by myself in conjunction with the Advanced Practice Provider. For all further details of the patient's emergency department visit, please see the Advanced Practice Provider's documentation.         Jose Angel Eugene DO  03/18/20 1116

## 2020-03-18 NOTE — TELEPHONE ENCOUNTER
I called the patient sounds so sick, she is having fever, and diarrhea too, she is on Levaquin but not helping, so tild her need to go to the ED for further evaluation

## 2020-03-18 NOTE — TELEPHONE ENCOUNTER
Patient called and stated that she has been on the antibiotic for 3 days and feels no better.  Call patient and advise [phone]

## 2020-03-19 LAB
HCT VFR BLD CALC: 43.3 % (ref 37–47)
HEMOGLOBIN: 13.5 GM/DL (ref 12.5–16)
LEGIONELLA URINARY AG: NEGATIVE
MCH RBC QN AUTO: 29.3 PG (ref 27–31)
MCHC RBC AUTO-ENTMCNC: 31.2 % (ref 32–36)
MCV RBC AUTO: 93.9 FL (ref 78–100)
PDW BLD-RTO: 13.9 % (ref 11.7–14.9)
PLATELET # BLD: 139 K/CU MM (ref 140–440)
PMV BLD AUTO: 10.4 FL (ref 7.5–11.1)
RBC # BLD: 4.61 M/CU MM (ref 4.2–5.4)
STREP PNEUMONIAE ANTIGEN: NORMAL
WBC # BLD: 3.2 K/CU MM (ref 4–10.5)

## 2020-03-19 PROCEDURE — 6370000000 HC RX 637 (ALT 250 FOR IP): Performed by: NURSE PRACTITIONER

## 2020-03-19 PROCEDURE — 6360000002 HC RX W HCPCS: Performed by: NURSE PRACTITIONER

## 2020-03-19 PROCEDURE — 2700000000 HC OXYGEN THERAPY PER DAY

## 2020-03-19 PROCEDURE — 94640 AIRWAY INHALATION TREATMENT: CPT

## 2020-03-19 PROCEDURE — 6370000000 HC RX 637 (ALT 250 FOR IP): Performed by: INTERNAL MEDICINE

## 2020-03-19 PROCEDURE — 1200000000 HC SEMI PRIVATE

## 2020-03-19 PROCEDURE — 94761 N-INVAS EAR/PLS OXIMETRY MLT: CPT

## 2020-03-19 PROCEDURE — 2580000003 HC RX 258: Performed by: NURSE PRACTITIONER

## 2020-03-19 PROCEDURE — 85027 COMPLETE CBC AUTOMATED: CPT

## 2020-03-19 RX ORDER — GUAIFENESIN 600 MG/1
600 TABLET, EXTENDED RELEASE ORAL 2 TIMES DAILY
Status: DISCONTINUED | OUTPATIENT
Start: 2020-03-19 | End: 2020-03-24 | Stop reason: HOSPADM

## 2020-03-19 RX ORDER — IPRATROPIUM BROMIDE AND ALBUTEROL SULFATE 2.5; .5 MG/3ML; MG/3ML
1 SOLUTION RESPIRATORY (INHALATION)
Status: DISCONTINUED | OUTPATIENT
Start: 2020-03-19 | End: 2020-03-21

## 2020-03-19 RX ORDER — CLONAZEPAM 1 MG/1
1 TABLET ORAL 2 TIMES DAILY PRN
Status: DISCONTINUED | OUTPATIENT
Start: 2020-03-19 | End: 2020-03-24 | Stop reason: HOSPADM

## 2020-03-19 RX ADMIN — SODIUM CHLORIDE, PRESERVATIVE FREE 10 ML: 5 INJECTION INTRAVENOUS at 20:26

## 2020-03-19 RX ADMIN — PANTOPRAZOLE SODIUM 40 MG: 40 TABLET, DELAYED RELEASE ORAL at 08:05

## 2020-03-19 RX ADMIN — GUAIFENESIN 600 MG: 600 TABLET, EXTENDED RELEASE ORAL at 20:25

## 2020-03-19 RX ADMIN — METHYLPREDNISOLONE SODIUM SUCCINATE 40 MG: 40 INJECTION, POWDER, FOR SOLUTION INTRAMUSCULAR; INTRAVENOUS at 08:05

## 2020-03-19 RX ADMIN — CETIRIZINE HYDROCHLORIDE 10 MG: 10 TABLET, FILM COATED ORAL at 08:05

## 2020-03-19 RX ADMIN — METHYLPREDNISOLONE SODIUM SUCCINATE 40 MG: 40 INJECTION, POWDER, FOR SOLUTION INTRAMUSCULAR; INTRAVENOUS at 20:26

## 2020-03-19 RX ADMIN — GUAIFENESIN 600 MG: 600 TABLET, EXTENDED RELEASE ORAL at 11:41

## 2020-03-19 RX ADMIN — MELATONIN 6 MG: at 20:25

## 2020-03-19 RX ADMIN — ASPIRIN 81 MG: 81 TABLET, COATED ORAL at 08:05

## 2020-03-19 RX ADMIN — CEFTRIAXONE 1 G: 1 INJECTION, POWDER, FOR SOLUTION INTRAMUSCULAR; INTRAVENOUS at 11:40

## 2020-03-19 RX ADMIN — HEPARIN SODIUM 5000 UNITS: 5000 INJECTION, SOLUTION INTRAVENOUS; SUBCUTANEOUS at 08:05

## 2020-03-19 RX ADMIN — METHYLPREDNISOLONE SODIUM SUCCINATE 40 MG: 40 INJECTION, POWDER, FOR SOLUTION INTRAMUSCULAR; INTRAVENOUS at 15:42

## 2020-03-19 RX ADMIN — SODIUM CHLORIDE: 9 INJECTION, SOLUTION INTRAVENOUS at 11:44

## 2020-03-19 RX ADMIN — THEOPHYLLINE ANHYDROUS 100 MG: 100 CAPSULE, EXTENDED RELEASE ORAL at 10:38

## 2020-03-19 RX ADMIN — METHYLPREDNISOLONE SODIUM SUCCINATE 40 MG: 40 INJECTION, POWDER, FOR SOLUTION INTRAMUSCULAR; INTRAVENOUS at 02:41

## 2020-03-19 RX ADMIN — HEPARIN SODIUM 5000 UNITS: 5000 INJECTION, SOLUTION INTRAVENOUS; SUBCUTANEOUS at 20:26

## 2020-03-19 RX ADMIN — AZITHROMYCIN MONOHYDRATE 500 MG: 500 INJECTION, POWDER, LYOPHILIZED, FOR SOLUTION INTRAVENOUS at 12:42

## 2020-03-19 RX ADMIN — CLONAZEPAM 1 MG: 1 TABLET ORAL at 23:44

## 2020-03-19 RX ADMIN — THEOPHYLLINE ANHYDROUS 100 MG: 100 CAPSULE, EXTENDED RELEASE ORAL at 20:25

## 2020-03-19 RX ADMIN — IPRATROPIUM BROMIDE AND ALBUTEROL SULFATE 1 AMPULE: .5; 3 SOLUTION RESPIRATORY (INHALATION) at 12:13

## 2020-03-19 RX ADMIN — CLONAZEPAM 1 MG: 1 TABLET ORAL at 10:38

## 2020-03-19 RX ADMIN — SODIUM CHLORIDE, PRESERVATIVE FREE 10 ML: 5 INJECTION INTRAVENOUS at 10:39

## 2020-03-19 RX ADMIN — MONTELUKAST 10 MG: 10 TABLET, FILM COATED ORAL at 20:25

## 2020-03-19 RX ADMIN — TRAZODONE HYDROCHLORIDE 50 MG: 50 TABLET ORAL at 20:25

## 2020-03-19 ASSESSMENT — PAIN SCALES - GENERAL
PAINLEVEL_OUTOF10: 0
PAINLEVEL_OUTOF10: 0

## 2020-03-19 NOTE — CONSULTS
[address], [address] [facility]                                  CONSULTATION    PATIENT NAME: Dana Meyer                    :        1945  MED REC NO:   [id number]                          ROOM:       6071  ACCOUNT NO:   [de-identified]                           ADMIT DATE: 2020  PROVIDER:     Lita Barone MD    CONSULT DATE:  2020    HISTORY OF PRESENT ILLNESS:  The patient is a 19-year-old lady with  multiple medical problems including COPD, hypertension, who was admitted  through the emergency room with complaints of increasing shortness of  breath, cough productive of thick whitish to yellow phlegm. She was  also complaining of subjective fever. She denied any chills. She  denied any nausea or vomiting. She denied any chest pains. According  to the patient, she had lost some weight unintentionally recently. In  the emergency room, she was short of breath. She was given breathing  treatment with some relief, but she continued to have shortness of  breath, so it was decided to admit her for aggressive therapy. PAST MEDICAL HISTORY:  Significant for COPD, hypertension. PAST SURGICAL HISTORY:  Remarkable for hysterectomy, cholecystectomy,  and abdominal surgery. FAMILY HISTORY:  Reveals that her mother had heart disease, diabetes. Father had cancer. SOCIAL HISTORY:  Reveals that she smokes about half pack per day and has  been smoking for 50 years. No history of alcohol or drug abuse. MEDICATIONS:  Reviewed. ALLERGIES:  She is allergic to PRAVASTATIN. REVIEW OF SYSTEMS:  10- to 14-point review of systems were reviewed and  are negative except for what is mentioned in the history of present  illness. PHYSICAL EXAMINATION:  GENERAL:  The patient is alert, oriented x3, in no acute respiratory  distress.   VITAL SIGNS:  Her blood pressure was 157/77 mmHg, pulse of 89 per  minute,

## 2020-03-19 NOTE — PLAN OF CARE
Problem: Falls - Risk of:  Goal: Will remain free from falls  Description: Will remain free from falls  3/18/2020 2337 by Nikunj Payne RN  Outcome: Ongoing  3/18/2020 1724 by Lindsey Whitney RN  Outcome: Ongoing  Goal: Absence of physical injury  Description: Absence of physical injury  3/18/2020 2337 by Nikunj Payne RN  Outcome: Ongoing  3/18/2020 1724 by Lindsey Whitney RN  Outcome: Ongoing     Problem: Nutritional:  Goal: Nutritional status will improve  Description: Nutritional status will improve  Outcome: Ongoing     Problem: Physical Regulation:  Goal: Diagnostic test results will improve  Description: Diagnostic test results will improve  Outcome: Ongoing  Goal: Will remain free from infection  Description: Will remain free from infection  Outcome: Ongoing  Goal: Ability to maintain vital signs within normal range will improve  Description: Ability to maintain vital signs within normal range will improve  Outcome: Ongoing     Problem: Respiratory:  Goal: Ability to maintain normal respiratory secretions will improve  Description: Ability to maintain normal respiratory secretions will improve  Outcome: Ongoing     Problem: Breathing Pattern - Ineffective:  Goal: Ability to achieve and maintain a regular respiratory rate will improve  Description: Ability to achieve and maintain a regular respiratory rate will improve  Outcome: Ongoing

## 2020-03-19 NOTE — PROGRESS NOTES
[address]    Hospitalist Progress Note      Name:  Beatriz Kaminski /Age/Sex: 1945  (75 y.o. female)   MRN & CSN:  [id number] & [id number] Admission Date/Time: 3/18/2020 10:24 AM   Location:  [address] [place] [address] PCP: Christiano Lockett MD         Hospital Day: 2    Assessment and Plan:   Beatriz Kaminski is a 75 y.o.  female  who presents with cough and shortness of breath    Acute COPD exacerbation     Continue with IV antibiotics, DuoNeb, IV Solu-Medrol  Resume home Singulair, theophylline,   Pulmonary service following    Community-acquired pneumonia    Started on IV azithromycin and Rocephin  Low suspicion for COVID-19 but test is pending   Check MRSA culture, procalcitonin, CRP  Maintain all necessary precautions as per CDC guidelines, Including use of PPE's, washing hands with soap and water for at least 20 seconds before and after each exam    Severe PEM-counseling, consult dietary    Tobacco abuse -NicoDerm patch, counseling on smoking cessation    Chronic conditions addressed    Hypertension  Depression  Hx of recurrent small bowel obstruction    Diet DIET GENERAL;  Dietary Nutrition Supplements: Standard High Calorie Oral Supplement   DVT Prophylaxis [] Lovenox, []  Heparin, [] SCDs, []No VTE prophylaxis, patient ambulating   GI Prophylaxis [] PPI, [] H2 Blocker, [] No GI prophylaxis, patient is receiving diet/Tube Feeds   Code Status Full Code   Disposition Patient requires continued admission due to COPD exacerbation   MDM [] Low, [x] Moderate,[]  High     History of Present Illness: Subjective     Patient Seen & Examined at the bedside     Patient is resting in bed with no distress while on nasal cannula  Had cough which is nonproductive  Denies any fever at this time but she felt that she had fever at home low grade temperature was 99    Ten point ROS reviewed negative, unless as noted above    Objective:        Intake/Output Summary (Last 24 hours) at 3/19/2020 1610  Last data filed at 3/19/2020 0614  Gross per 24 hour   Intake 1051.67 ml   Output 300 ml   Net 751.67 ml      Vitals:   Vitals:    03/19/20 1213   BP:    Pulse:    Resp:    Temp:    SpO2: 91%     Physical Exam:    GEN Awake female, resting in bed in no apparent distress. Appears given age. RESP Clear to auscultation, no wheezes, rales or rhonchi. CARDIO/VASC -S1/S2 auscultated. Regular rate without appreciable murmurs, rubs, or gallops. Peripheral pulses equal bilaterally and palpable. No peripheral edema. GI Abdomen is soft without significant tenderness, masses, or guarding. Bowel sounds are normoactive. Rectal exam deferred.  Bell catheter is not present. MSK No gross joint deformities. Spontaneous movement of all extremities  SKIN Normal coloration, warm, dry.     Medications:   Medications:    ipratropium-albuterol  1 ampule Inhalation Q4H WA    guaiFENesin  600 mg Oral BID    theophylline  100 mg Oral BID    aspirin  81 mg Oral Daily    traZODone  50 mg Oral Nightly    pantoprazole  40 mg Oral Daily    montelukast  10 mg Oral Nightly    melatonin  6 mg Oral Nightly    cetirizine  10 mg Oral Daily    sodium chloride flush  10 mL Intravenous 2 times per day    methylPREDNISolone  40 mg Intravenous Q6H    Followed by   Kendall Seat ON 3/21/2020] predniSONE  40 mg Oral Daily    cefTRIAXone (ROCEPHIN) IV  1 g Intravenous Q24H    azithromycin  500 mg Intravenous Q24H    heparin (porcine)  5,000 Units Subcutaneous BID      Infusions:    sodium chloride 50 mL/hr at 03/19/20 1144     PRN Meds: clonazePAM, 1 mg, BID PRN  sodium chloride, 1 spray, PRN  sodium chloride flush, 10 mL, PRN  acetaminophen, 650 mg, Q6H PRN    Or  acetaminophen, 650 mg, Q6H PRN  polyethylene glycol, 17 g, Daily PRN  promethazine, 12.5 mg, Q6H PRN    Or  ondansetron, 4 mg, Q6H PRN          Electronically signed by Adolph Sandhoff, MD on 3/19/2020 at 4:10 PM

## 2020-03-20 LAB
ALBUMIN SERPL-MCNC: 3.6 GM/DL (ref 3.4–5)
ALP BLD-CCNC: 87 IU/L (ref 40–128)
ALT SERPL-CCNC: 15 U/L (ref 10–40)
ANION GAP SERPL CALCULATED.3IONS-SCNC: 10 MMOL/L (ref 4–16)
AST SERPL-CCNC: 21 IU/L (ref 15–37)
BASOPHILS ABSOLUTE: 0 K/CU MM
BASOPHILS RELATIVE PERCENT: 0.1 % (ref 0–1)
BILIRUB SERPL-MCNC: 0.2 MG/DL (ref 0–1)
BUN BLDV-MCNC: 17 MG/DL (ref 6–23)
CALCIUM SERPL-MCNC: 8.9 MG/DL (ref 8.3–10.6)
CHLORIDE BLD-SCNC: 102 MMOL/L (ref 99–110)
CO2: 29 MMOL/L (ref 21–32)
CREAT SERPL-MCNC: 0.6 MG/DL (ref 0.6–1.1)
DIFFERENTIAL TYPE: ABNORMAL
EOSINOPHILS ABSOLUTE: 0 K/CU MM
EOSINOPHILS RELATIVE PERCENT: 0 % (ref 0–3)
GFR AFRICAN AMERICAN: >60 ML/MIN/1.73M2
GFR NON-AFRICAN AMERICAN: >60 ML/MIN/1.73M2
GLUCOSE BLD-MCNC: 140 MG/DL (ref 70–99)
HCT VFR BLD CALC: 43.4 % (ref 37–47)
HEMOGLOBIN: 13.5 GM/DL (ref 12.5–16)
HIGH SENSITIVE C-REACTIVE PROTEIN: 12.8 MG/L
IMMATURE NEUTROPHIL %: 0.8 % (ref 0–0.43)
LYMPHOCYTES ABSOLUTE: 1.2 K/CU MM
LYMPHOCYTES RELATIVE PERCENT: 13.9 % (ref 24–44)
MAGNESIUM: 2.2 MG/DL (ref 1.8–2.4)
MCH RBC QN AUTO: 29.3 PG (ref 27–31)
MCHC RBC AUTO-ENTMCNC: 31.1 % (ref 32–36)
MCV RBC AUTO: 94.3 FL (ref 78–100)
MONOCYTES ABSOLUTE: 0.3 K/CU MM
MONOCYTES RELATIVE PERCENT: 4.1 % (ref 0–4)
PDW BLD-RTO: 14.1 % (ref 11.7–14.9)
PHOSPHORUS: 3.6 MG/DL (ref 2.5–4.9)
PLATELET # BLD: 189 K/CU MM (ref 140–440)
PLT MORPHOLOGY: ABNORMAL
PMV BLD AUTO: 10.5 FL (ref 7.5–11.1)
POLYCHROMASIA: ABNORMAL
POTASSIUM SERPL-SCNC: 4.3 MMOL/L (ref 3.5–5.1)
PROCALCITONIN: 0.08
RBC # BLD: 4.6 M/CU MM (ref 4.2–5.4)
SEGMENTED NEUTROPHILS ABSOLUTE COUNT: 6.8 K/CU MM
SEGMENTED NEUTROPHILS RELATIVE PERCENT: 81.1 % (ref 36–66)
SODIUM BLD-SCNC: 141 MMOL/L (ref 135–145)
TOTAL IMMATURE NEUTOROPHIL: 0.07 K/CU MM
TOTAL PROTEIN: 5.7 GM/DL (ref 6.4–8.2)
WBC # BLD: 8.4 K/CU MM (ref 4–10.5)
WBC # BLD: ABNORMAL 10*3/UL

## 2020-03-20 PROCEDURE — 2700000000 HC OXYGEN THERAPY PER DAY

## 2020-03-20 PROCEDURE — 6370000000 HC RX 637 (ALT 250 FOR IP): Performed by: INTERNAL MEDICINE

## 2020-03-20 PROCEDURE — 84145 PROCALCITONIN (PCT): CPT

## 2020-03-20 PROCEDURE — 6360000002 HC RX W HCPCS: Performed by: NURSE PRACTITIONER

## 2020-03-20 PROCEDURE — 1200000000 HC SEMI PRIVATE

## 2020-03-20 PROCEDURE — 84100 ASSAY OF PHOSPHORUS: CPT

## 2020-03-20 PROCEDURE — 85025 COMPLETE CBC W/AUTO DIFF WBC: CPT

## 2020-03-20 PROCEDURE — 6370000000 HC RX 637 (ALT 250 FOR IP): Performed by: NURSE PRACTITIONER

## 2020-03-20 PROCEDURE — 86141 C-REACTIVE PROTEIN HS: CPT

## 2020-03-20 PROCEDURE — 94640 AIRWAY INHALATION TREATMENT: CPT

## 2020-03-20 PROCEDURE — 83735 ASSAY OF MAGNESIUM: CPT

## 2020-03-20 PROCEDURE — 80053 COMPREHEN METABOLIC PANEL: CPT

## 2020-03-20 PROCEDURE — 94761 N-INVAS EAR/PLS OXIMETRY MLT: CPT

## 2020-03-20 PROCEDURE — 2580000003 HC RX 258: Performed by: NURSE PRACTITIONER

## 2020-03-20 RX ADMIN — IPRATROPIUM BROMIDE AND ALBUTEROL SULFATE 1 AMPULE: .5; 3 SOLUTION RESPIRATORY (INHALATION) at 15:13

## 2020-03-20 RX ADMIN — MONTELUKAST 10 MG: 10 TABLET, FILM COATED ORAL at 20:37

## 2020-03-20 RX ADMIN — IPRATROPIUM BROMIDE AND ALBUTEROL SULFATE 1 AMPULE: .5; 3 SOLUTION RESPIRATORY (INHALATION) at 07:59

## 2020-03-20 RX ADMIN — AZITHROMYCIN MONOHYDRATE 500 MG: 500 INJECTION, POWDER, LYOPHILIZED, FOR SOLUTION INTRAVENOUS at 11:52

## 2020-03-20 RX ADMIN — ASPIRIN 81 MG: 81 TABLET, COATED ORAL at 08:55

## 2020-03-20 RX ADMIN — MELATONIN 6 MG: at 20:37

## 2020-03-20 RX ADMIN — CLONAZEPAM 1 MG: 1 TABLET ORAL at 20:36

## 2020-03-20 RX ADMIN — METHYLPREDNISOLONE SODIUM SUCCINATE 40 MG: 40 INJECTION, POWDER, FOR SOLUTION INTRAMUSCULAR; INTRAVENOUS at 03:31

## 2020-03-20 RX ADMIN — IPRATROPIUM BROMIDE AND ALBUTEROL SULFATE 1 AMPULE: .5; 3 SOLUTION RESPIRATORY (INHALATION) at 11:40

## 2020-03-20 RX ADMIN — SODIUM CHLORIDE, PRESERVATIVE FREE 10 ML: 5 INJECTION INTRAVENOUS at 20:37

## 2020-03-20 RX ADMIN — HEPARIN SODIUM 5000 UNITS: 5000 INJECTION, SOLUTION INTRAVENOUS; SUBCUTANEOUS at 20:38

## 2020-03-20 RX ADMIN — CETIRIZINE HYDROCHLORIDE 10 MG: 10 TABLET, FILM COATED ORAL at 08:55

## 2020-03-20 RX ADMIN — CEFTRIAXONE 1 G: 1 INJECTION, POWDER, FOR SOLUTION INTRAMUSCULAR; INTRAVENOUS at 10:47

## 2020-03-20 RX ADMIN — METHYLPREDNISOLONE SODIUM SUCCINATE 40 MG: 40 INJECTION, POWDER, FOR SOLUTION INTRAMUSCULAR; INTRAVENOUS at 08:55

## 2020-03-20 RX ADMIN — HEPARIN SODIUM 5000 UNITS: 5000 INJECTION, SOLUTION INTRAVENOUS; SUBCUTANEOUS at 09:00

## 2020-03-20 RX ADMIN — PANTOPRAZOLE SODIUM 40 MG: 40 TABLET, DELAYED RELEASE ORAL at 08:55

## 2020-03-20 RX ADMIN — THEOPHYLLINE ANHYDROUS 100 MG: 100 CAPSULE, EXTENDED RELEASE ORAL at 08:55

## 2020-03-20 RX ADMIN — SODIUM CHLORIDE, PRESERVATIVE FREE 10 ML: 5 INJECTION INTRAVENOUS at 08:56

## 2020-03-20 RX ADMIN — GUAIFENESIN 600 MG: 600 TABLET, EXTENDED RELEASE ORAL at 08:55

## 2020-03-20 RX ADMIN — TRAZODONE HYDROCHLORIDE 50 MG: 50 TABLET ORAL at 20:37

## 2020-03-20 RX ADMIN — GUAIFENESIN 600 MG: 600 TABLET, EXTENDED RELEASE ORAL at 20:37

## 2020-03-20 RX ADMIN — THEOPHYLLINE ANHYDROUS 100 MG: 100 CAPSULE, EXTENDED RELEASE ORAL at 20:37

## 2020-03-20 ASSESSMENT — PAIN SCALES - GENERAL: PAINLEVEL_OUTOF10: 0

## 2020-03-20 NOTE — CARE COORDINATION
Hilda Thomas is discharged, she qualified for home oxygen. It is being set up thru [address]. A hospital follow up appointment has been made for her with her PCP. Zeus Albarran 03/24/20     Hilda Thomas is in isolation. To reduce the equipment use during the pandemic educational material was given to Nursing to give to patient the next time they go into the room. Educational material given: [employer], COPD Stop Light, [facility] of the [address], [employer] Stop Smoking. Zeus Albarran 03/23/20     Hilda Thomas is here for COVID 19 testing. She is positive for Human Metapneumovirus.   Will follow  Zeus Albarran 03/20/20     COPD CHECKLIST    Was the COPD Order set used      Yes  Pneumonia/COPD Stoplight Education   Yes  Blood Cultures drawn before 1st antibiotic given   No  Antibiotic given within 24 hours    Yes  O2 Saturation on admission     86% RA    Consults:             Smoking Cessation      Yes  Pulmonary       Yes  Med Assist Consult      No  Home Health Care Consult     No  Palliative Care Consult     No  Respiratory Consult      Yes  (including bedside instructions on nebulizers, inhalers, and assessment of oxygen and equipment needs at home)    Discharge:             Pneumococcal Vaccine given before discharge  UTD  Flu Vaccine given before discharge    UTD  Inhalers       Yes  Spacer        Yes  Steroids       No  Follow-up appointment scheduled within 5-7 days  Yes  Cardio/Pulmonary Wellness program consult  No  Home Oxygen       Yes  Nebulizer, bi-pap, c-pap at home    No  Home air quality assessment [facility]) No    Where do you live     Primary Physician:               [x] Home     [x] Dr. Saskia Eugene               [] Independent Living              [][facility]               [] Assisted Living    [] [address] [facility] [place]               [] [address]       [] Homeless/Homeless Shelter              Pulmonary Physician:    Consulted:  [] Melissa Morales     [x] Yes   [] Angelo     [] No  [x] Ezequiel  []

## 2020-03-20 NOTE — PROGRESS NOTES
Hospitalist Progress Note      Name:  Naty Hightower /Age/Sex: 1945  (75 y.o. female)   MRN & CSN:  [id number] & [id number] Admission Date/Time: 3/18/2020 10:24 AM   Location:  [address] [place] [address] PCP: Aly Norris MD         Hospital Day: 3    Assessment and Plan:     Naty Hightower is a 75 y.o.  female  who presents with cough and shortness of breath     Acute COPD exacerbation   CT Chest: peribronchial thickening in the bilateral lower lungs as well as tree in bud nodularity in the Lt lower lobe  Continue with IV antibiotics, DuoNeb, IV Solu-Medrol  Resume home Singulair, theophylline,   Pulmonary on board     Probable Atypical Pneumonia  Started on IV azithromycin and Rocephin  CRP and Procal both not elevated  PDP positive for Human Metapneumonia  Low suspicion for COVID-19 but test is pending      Severe PEM-counseling, consult dietary     Tobacco abuse -NicoDerm patch, counseling on smoking cessation     Chronic conditions addressed   Hypertension  Depression  Hx of recurrent small bowel obstruction    Diet DIET GENERAL;  Dietary Nutrition Supplements: Standard High Calorie Oral Supplement   DVT Prophylaxis [] Lovenox, []  Heparin, [] SCDs, [] Ambulation   GI Prophylaxis [] PPI,  [] H2 Blocker,  [] Carafate,  [] Diet/Tube Feeds   Code Status Full Code   Disposition Home   MDM      History of Present Illness:     Patient was seen and examined  Denied any worsening SOB, dizziness  No chest pain, palpitations  No fever, chills, N/V/D  Cough slowly improving       Ten point ROS reviewed negative, unless as noted above    Objective: Intake/Output Summary (Last 24 hours) at 3/20/2020 1249  Last data filed at 3/20/2020 0802  Gross per 24 hour   Intake 480 ml   Output --   Net 480 ml      Vitals:   Vitals:    20 1140   BP:    Pulse:    Resp: 17   Temp:    SpO2: 93%     Physical Exam:   GEN Awake female, sitting upright in bed in no apparent distress. Appears given age.   EYES Pupils are equally round. No scleral erythema, discharge, or conjunctivitis. HENT Mucous membranes are moist. Oral pharynx without exudates, no evidence of thrush. NECK Supple, no apparent thyromegaly or masses. RESP End expiratory wheezes bilaterally. Symmetric chest movement while on 4L of O2.  CARDIO/VASC S1/S2 auscultated. Regular rate without appreciable murmurs, rubs, or gallops. No JVD or carotid bruits. Peripheral pulses equal bilaterally and palpable. No peripheral edema. GI Abdomen is soft without significant tenderness, masses, or guarding. Bowel sounds are normoactive. Rectal exam deferred.  No costovertebral angle tenderness. Normal appearing external genitalia. Bell catheter is not present. HEME/LYMPH No palpable cervical lymphadenopathy and no hepatosplenomegaly. No petechiae or ecchymoses. MSK No gross joint deformities. SKIN Normal coloration, warm, dry. NEURO Cranial nerves appear grossly intact, normal speech, no lateralizing weakness. PSYCH Awake, alert, oriented x 4. Affect appropriate.     Medications:   Medications:    ipratropium-albuterol  1 ampule Inhalation Q4H WA    guaiFENesin  600 mg Oral BID    theophylline  100 mg Oral BID    aspirin  81 mg Oral Daily    traZODone  50 mg Oral Nightly    pantoprazole  40 mg Oral Daily    montelukast  10 mg Oral Nightly    melatonin  6 mg Oral Nightly    cetirizine  10 mg Oral Daily    sodium chloride flush  10 mL Intravenous 2 times per day    [START ON 3/21/2020] predniSONE  40 mg Oral Daily    cefTRIAXone (ROCEPHIN) IV  1 g Intravenous Q24H    azithromycin  500 mg Intravenous Q24H    heparin (porcine)  5,000 Units Subcutaneous BID      Infusions:    sodium chloride 50 mL/hr at 03/19/20 1144     PRN Meds: clonazePAM, 1 mg, BID PRN  sodium chloride, 1 spray, PRN  sodium chloride flush, 10 mL, PRN  acetaminophen, 650 mg, Q6H PRN    Or  acetaminophen, 650 mg, Q6H PRN  polyethylene glycol, 17 g, Daily PRN  promethazine, 12.5 mg, Q6H PRN Or  ondansetron, 4 mg, Q6H PRN          Electronically signed by Radha Guajardo MD on 3/20/2020 at 12:49 PM

## 2020-03-20 NOTE — PLAN OF CARE
Problem: Falls - Risk of:  Goal: Will remain free from falls  Description: Will remain free from falls  Outcome: Ongoing  Goal: Absence of physical injury  Description: Absence of physical injury  Outcome: Ongoing     Problem: Nutritional:  Goal: Nutritional status will improve  Description: Nutritional status will improve  Outcome: Ongoing     Problem: Physical Regulation:  Goal: Diagnostic test results will improve  Description: Diagnostic test results will improve  Outcome: Ongoing  Goal: Will remain free from infection  Description: Will remain free from infection  Outcome: Ongoing  Goal: Ability to maintain vital signs within normal range will improve  Description: Ability to maintain vital signs within normal range will improve  Outcome: Ongoing     Problem: Respiratory:  Goal: Ability to maintain normal respiratory secretions will improve  Description: Ability to maintain normal respiratory secretions will improve  Outcome: Ongoing     Problem: Breathing Pattern - Ineffective:  Goal: Ability to achieve and maintain a regular respiratory rate will improve  Description: Ability to achieve and maintain a regular respiratory rate will improve  Outcome: Ongoing     Problem: Nutrition  Goal: Optimal nutrition therapy  Outcome: Ongoing

## 2020-03-21 LAB
DOSE AMOUNT: ABNORMAL
DOSE TIME: ABNORMAL
THEOPHYLLINE LEVEL: 4.9 UG/ML (ref 10–20)

## 2020-03-21 PROCEDURE — 94640 AIRWAY INHALATION TREATMENT: CPT

## 2020-03-21 PROCEDURE — 6370000000 HC RX 637 (ALT 250 FOR IP): Performed by: INTERNAL MEDICINE

## 2020-03-21 PROCEDURE — 1200000000 HC SEMI PRIVATE

## 2020-03-21 PROCEDURE — 80198 ASSAY OF THEOPHYLLINE: CPT

## 2020-03-21 PROCEDURE — 94761 N-INVAS EAR/PLS OXIMETRY MLT: CPT

## 2020-03-21 PROCEDURE — 6360000002 HC RX W HCPCS: Performed by: NURSE PRACTITIONER

## 2020-03-21 PROCEDURE — 2580000003 HC RX 258: Performed by: NURSE PRACTITIONER

## 2020-03-21 PROCEDURE — 2700000000 HC OXYGEN THERAPY PER DAY

## 2020-03-21 PROCEDURE — 6370000000 HC RX 637 (ALT 250 FOR IP): Performed by: NURSE PRACTITIONER

## 2020-03-21 PROCEDURE — 36415 COLL VENOUS BLD VENIPUNCTURE: CPT

## 2020-03-21 RX ORDER — ALBUTEROL SULFATE 90 UG/1
2 AEROSOL, METERED RESPIRATORY (INHALATION) 4 TIMES DAILY
Status: DISCONTINUED | OUTPATIENT
Start: 2020-03-21 | End: 2020-03-24 | Stop reason: HOSPADM

## 2020-03-21 RX ADMIN — IPRATROPIUM BROMIDE 2 PUFF: 17 AEROSOL, METERED RESPIRATORY (INHALATION) at 11:37

## 2020-03-21 RX ADMIN — THEOPHYLLINE ANHYDROUS 100 MG: 100 CAPSULE, EXTENDED RELEASE ORAL at 09:27

## 2020-03-21 RX ADMIN — CLONAZEPAM 1 MG: 1 TABLET ORAL at 09:41

## 2020-03-21 RX ADMIN — MONTELUKAST 10 MG: 10 TABLET, FILM COATED ORAL at 20:43

## 2020-03-21 RX ADMIN — CLONAZEPAM 1 MG: 1 TABLET ORAL at 20:43

## 2020-03-21 RX ADMIN — SODIUM CHLORIDE: 9 INJECTION, SOLUTION INTRAVENOUS at 05:55

## 2020-03-21 RX ADMIN — THEOPHYLLINE ANHYDROUS 100 MG: 100 CAPSULE, EXTENDED RELEASE ORAL at 20:43

## 2020-03-21 RX ADMIN — PREDNISONE 40 MG: 20 TABLET ORAL at 09:27

## 2020-03-21 RX ADMIN — IPRATROPIUM BROMIDE 2 PUFF: 17 AEROSOL, METERED RESPIRATORY (INHALATION) at 07:45

## 2020-03-21 RX ADMIN — GUAIFENESIN 600 MG: 600 TABLET, EXTENDED RELEASE ORAL at 20:43

## 2020-03-21 RX ADMIN — ALBUTEROL SULFATE 2 PUFF: 90 AEROSOL, METERED RESPIRATORY (INHALATION) at 21:03

## 2020-03-21 RX ADMIN — PANTOPRAZOLE SODIUM 40 MG: 40 TABLET, DELAYED RELEASE ORAL at 09:27

## 2020-03-21 RX ADMIN — AZITHROMYCIN MONOHYDRATE 500 MG: 500 INJECTION, POWDER, LYOPHILIZED, FOR SOLUTION INTRAVENOUS at 11:55

## 2020-03-21 RX ADMIN — CETIRIZINE HYDROCHLORIDE 10 MG: 10 TABLET, FILM COATED ORAL at 09:27

## 2020-03-21 RX ADMIN — MELATONIN 6 MG: at 20:43

## 2020-03-21 RX ADMIN — GUAIFENESIN 600 MG: 600 TABLET, EXTENDED RELEASE ORAL at 09:27

## 2020-03-21 RX ADMIN — TRAZODONE HYDROCHLORIDE 50 MG: 50 TABLET ORAL at 20:43

## 2020-03-21 RX ADMIN — ALBUTEROL SULFATE 2 PUFF: 90 AEROSOL, METERED RESPIRATORY (INHALATION) at 11:37

## 2020-03-21 RX ADMIN — ASPIRIN 81 MG: 81 TABLET, COATED ORAL at 09:27

## 2020-03-21 RX ADMIN — THEOPHYLLINE ANHYDROUS 100 MG: 100 CAPSULE, EXTENDED RELEASE ORAL at 12:21

## 2020-03-21 RX ADMIN — ALBUTEROL SULFATE 2 PUFF: 90 AEROSOL, METERED RESPIRATORY (INHALATION) at 15:18

## 2020-03-21 RX ADMIN — CEFTRIAXONE 1 G: 1 INJECTION, POWDER, FOR SOLUTION INTRAMUSCULAR; INTRAVENOUS at 09:28

## 2020-03-21 RX ADMIN — ALBUTEROL SULFATE 2 PUFF: 90 AEROSOL, METERED RESPIRATORY (INHALATION) at 07:46

## 2020-03-21 NOTE — PROGRESS NOTES
Hospitalist Progress Note      Name:  Sunny Lu /Age/Sex: 1945  (75 y.o. female)   MRN & CSN:  [id number] & [id number] Admission Date/Time: 3/18/2020 10:24 AM   Location:  [address] [place] [address] PCP: Brigid Oneal MD         Hospital Day: 4    Assessment and Plan:   Karen Hale a 75 y. o.  female  who presents with cough and shortness of breath     Acute COPD exacerbation   CT Chest: peribronchial thickening in the bilateral lower lungs as well as tree in bud nodularity in the Lt lower lobe  Continue with IV antibiotics, DuoNeb, IV Solu-Medrol   Resume home Singulair, theophylline,   Pulmonary on board     Probable Atypical Pneumonia  Started on IV azithromycin and Rocephin  CRP and Procal both not elevated  PDP positive for Human Metapneumonia  Low suspicion for COVID-19 but test is pending     Acute Hypoxic Respiratory failure 2/2 above  -Started 4L of O2  -Will start weaning off as tolerated  -Continue COPD and PNA management     Severe PEM-counseling, consult dietary     Tobacco abuse -NicoDerm patch, counseling on smoking cessation     Chronic conditions addressed   Hypertension  Depression  Hx of recurrent small bowel obstruction    Diet DIET GENERAL;  Dietary Nutrition Supplements: Standard High Calorie Oral Supplement   DVT Prophylaxis [] Lovenox, []  Heparin, [] SCDs, [] Ambulation   GI Prophylaxis [] PPI,  [] H2 Blocker,  [] Carafate,  [] Diet/Tube Feeds   Code Status Full Code   Disposition Home   MDM      History of Present Illness:     Patient was seen and examined  No worsening cough, SOB  No fever, chills, dizziness  No chest pain, palpitations    Ten point ROS reviewed negative, unless as noted above    Objective:        Intake/Output Summary (Last 24 hours) at 3/21/2020 0942  Last data filed at 3/20/2020 1312  Gross per 24 hour   Intake 240 ml   Output --   Net 240 ml      Vitals:   Vitals:    20 0747   BP:    Pulse:    Resp: 16   Temp:    SpO2:      Physical Exam:   GEN Awake female, sitting upright in bed in no apparent distress. Appears given age. EYES Pupils are equally round. No scleral erythema, discharge, or conjunctivitis. HENT Mucous membranes are moist. Oral pharynx without exudates, no evidence of thrush. NECK Supple, no apparent thyromegaly or masses. RESP Bilateral wheezes in both lungs. Symmetric chest movement while on 4L of O2.  CARDIO/VASC S1/S2 auscultated. Regular rate without appreciable murmurs, rubs, or gallops. No JVD or carotid bruits. Peripheral pulses equal bilaterally and palpable. No peripheral edema. GI Abdomen is soft without significant tenderness, masses, or guarding. Bowel sounds are normoactive. Rectal exam deferred.  No costovertebral angle tenderness. Normal appearing external genitalia. Bell catheter is not present. HEME/LYMPH No palpable cervical lymphadenopathy and no hepatosplenomegaly. No petechiae or ecchymoses. MSK No gross joint deformities. SKIN Normal coloration, warm, dry. NEURO Cranial nerves appear grossly intact, normal speech, no lateralizing weakness. PSYCH Awake, alert, oriented x 4. Affect appropriate.     Medications:   Medications:    albuterol sulfate HFA  2 puff Inhalation 4x daily    ipratropium  2 puff Inhalation 4x daily    guaiFENesin  600 mg Oral BID    theophylline  100 mg Oral BID    aspirin  81 mg Oral Daily    traZODone  50 mg Oral Nightly    pantoprazole  40 mg Oral Daily    montelukast  10 mg Oral Nightly    melatonin  6 mg Oral Nightly    cetirizine  10 mg Oral Daily    sodium chloride flush  10 mL Intravenous 2 times per day    predniSONE  40 mg Oral Daily    cefTRIAXone (ROCEPHIN) IV  1 g Intravenous Q24H    azithromycin  500 mg Intravenous Q24H    heparin (porcine)  5,000 Units Subcutaneous BID      Infusions:    sodium chloride 50 mL/hr at 03/21/20 0555     PRN Meds: clonazePAM, 1 mg, BID PRN  sodium chloride, 1 spray, PRN  sodium chloride flush, 10 mL, PRN  acetaminophen, 650 mg, Q6H PRN    Or  acetaminophen, 650 mg, Q6H PRN  polyethylene glycol, 17 g, Daily PRN  promethazine, 12.5 mg, Q6H PRN    Or  ondansetron, 4 mg, Q6H PRN          Electronically signed by Radha Guajardo MD on 3/21/2020 at 9:42 AM

## 2020-03-21 NOTE — PROGRESS NOTES
Pulmonary and Critical Care  Progress Note    Subjective: The patient is better. Shortness of breath has improved. Chest pain none  Addressing respiratory complaints Patient is negative for  hemoptysis and cyanosis  CONSTITUTIONAL:  negative for fevers and chills      Past Medical History:     has a past medical history of Former smoker, Hypertension, Moderate COPD (chronic obstructive pulmonary disease) ([address] and Other emphysema ([address] has a past surgical history that includes Hysterectomy; Cholecystectomy; Abdomen surgery; and other surgical history (07/16/2018). reports that she has been smoking cigarettes. She has a 25.00 pack-year smoking history. She has never used smokeless tobacco. She reports that she does not drink alcohol or use drugs. Family history:  family history includes Cancer in her father; Diabetes in her brother and mother; Heart Disease in her mother. Allergies   Allergen Reactions    Pravastatin Sodium Other (See Comments)     Myalgias (note from 5/15/18)     Social History:    Reviewed; no changes    Objective:   PHYSICAL EXAM:        VITALS:  BP (!) 148/86   Pulse 74   Temp 97.7 °F (36.5 °C) (Oral)   Resp 16   Ht 5' 2\" (1.575 m)   Wt 88 lb (39.9 kg)   SpO2 94%   BMI 16.10 kg/m²     24HR INTAKE/OUTPUT:      Intake/Output Summary (Last 24 hours) at 3/21/2020 1117  Last data filed at 3/20/2020 1312  Gross per 24 hour   Intake 240 ml   Output --   Net 240 ml       CONSTITUTIONAL:  awake, alert, cooperative, no apparent distress, and appears stated age  LUNGS:  Moving air better. CARDIOVASCULAR:  normal S1 and S2 and negative JVD  ABD:Abdomen soft, non-tender. BS normal. No masses,  No organomegaly  NEURO:Alert and oriented x3. Gait normal. Reflexes and motor strength normal and symmetric. Cranial nerves 2-12 and sensation grossly intact.   DATA:    CBC:  Recent Labs     03/18/20  1146 03/19/20  0222 03/20/20  0816   WBC 5.4 3.2* 8.4   RBC 4.75 4.61 4.60   HGB 14.1 13.5 13.5

## 2020-03-22 LAB
ALBUMIN SERPL-MCNC: 3.1 GM/DL (ref 3.4–5)
ALP BLD-CCNC: 67 IU/L (ref 40–128)
ALT SERPL-CCNC: 17 U/L (ref 10–40)
ANION GAP SERPL CALCULATED.3IONS-SCNC: 7 MMOL/L (ref 4–16)
AST SERPL-CCNC: 17 IU/L (ref 15–37)
BASOPHILS ABSOLUTE: 0 K/CU MM
BASOPHILS RELATIVE PERCENT: 0 % (ref 0–1)
BILIRUB SERPL-MCNC: 0.2 MG/DL (ref 0–1)
BUN BLDV-MCNC: 15 MG/DL (ref 6–23)
CALCIUM SERPL-MCNC: 8.5 MG/DL (ref 8.3–10.6)
CHLORIDE BLD-SCNC: 102 MMOL/L (ref 99–110)
CO2: 34 MMOL/L (ref 21–32)
CREAT SERPL-MCNC: 0.7 MG/DL (ref 0.6–1.1)
DIFFERENTIAL TYPE: ABNORMAL
DOSE AMOUNT: ABNORMAL
DOSE TIME: ABNORMAL
EOSINOPHILS ABSOLUTE: 0 K/CU MM
EOSINOPHILS RELATIVE PERCENT: 0 % (ref 0–3)
GFR AFRICAN AMERICAN: >60 ML/MIN/1.73M2
GFR NON-AFRICAN AMERICAN: >60 ML/MIN/1.73M2
GLUCOSE BLD-MCNC: 103 MG/DL (ref 70–99)
HCT VFR BLD CALC: 35.8 % (ref 37–47)
HEMOGLOBIN: 11.2 GM/DL (ref 12.5–16)
IMMATURE NEUTROPHIL %: 0.2 % (ref 0–0.43)
LYMPHOCYTES ABSOLUTE: 1.2 K/CU MM
LYMPHOCYTES RELATIVE PERCENT: 27.3 % (ref 24–44)
MCH RBC QN AUTO: 29.4 PG (ref 27–31)
MCHC RBC AUTO-ENTMCNC: 31.3 % (ref 32–36)
MCV RBC AUTO: 94 FL (ref 78–100)
MONOCYTES ABSOLUTE: 0.6 K/CU MM
MONOCYTES RELATIVE PERCENT: 14.3 % (ref 0–4)
NUCLEATED RBC %: 0 %
PDW BLD-RTO: 13.9 % (ref 11.7–14.9)
PLATELET # BLD: 179 K/CU MM (ref 140–440)
PMV BLD AUTO: 10.7 FL (ref 7.5–11.1)
POTASSIUM SERPL-SCNC: 3.9 MMOL/L (ref 3.5–5.1)
RBC # BLD: 3.81 M/CU MM (ref 4.2–5.4)
SEGMENTED NEUTROPHILS ABSOLUTE COUNT: 2.6 K/CU MM
SEGMENTED NEUTROPHILS RELATIVE PERCENT: 58.2 % (ref 36–66)
SODIUM BLD-SCNC: 143 MMOL/L (ref 135–145)
THEOPHYLLINE LEVEL: 5 UG/ML (ref 10–20)
TOTAL IMMATURE NEUTOROPHIL: 0.01 K/CU MM
TOTAL NUCLEATED RBC: 0 K/CU MM
TOTAL PROTEIN: 4.7 GM/DL (ref 6.4–8.2)
WBC # BLD: 4.5 K/CU MM (ref 4–10.5)

## 2020-03-22 PROCEDURE — 1200000000 HC SEMI PRIVATE

## 2020-03-22 PROCEDURE — 6370000000 HC RX 637 (ALT 250 FOR IP): Performed by: INTERNAL MEDICINE

## 2020-03-22 PROCEDURE — 85025 COMPLETE CBC W/AUTO DIFF WBC: CPT

## 2020-03-22 PROCEDURE — 80198 ASSAY OF THEOPHYLLINE: CPT

## 2020-03-22 PROCEDURE — 6360000002 HC RX W HCPCS: Performed by: NURSE PRACTITIONER

## 2020-03-22 PROCEDURE — 80053 COMPREHEN METABOLIC PANEL: CPT

## 2020-03-22 PROCEDURE — 6370000000 HC RX 637 (ALT 250 FOR IP): Performed by: NURSE PRACTITIONER

## 2020-03-22 PROCEDURE — 2580000003 HC RX 258: Performed by: NURSE PRACTITIONER

## 2020-03-22 PROCEDURE — 94761 N-INVAS EAR/PLS OXIMETRY MLT: CPT

## 2020-03-22 PROCEDURE — 94640 AIRWAY INHALATION TREATMENT: CPT

## 2020-03-22 RX ADMIN — CETIRIZINE HYDROCHLORIDE 10 MG: 10 TABLET, FILM COATED ORAL at 09:14

## 2020-03-22 RX ADMIN — ALBUTEROL SULFATE 2 PUFF: 90 AEROSOL, METERED RESPIRATORY (INHALATION) at 08:00

## 2020-03-22 RX ADMIN — THEOPHYLLINE ANHYDROUS 100 MG: 100 CAPSULE, EXTENDED RELEASE ORAL at 14:22

## 2020-03-22 RX ADMIN — THEOPHYLLINE ANHYDROUS 100 MG: 100 CAPSULE, EXTENDED RELEASE ORAL at 22:39

## 2020-03-22 RX ADMIN — CLONAZEPAM 1 MG: 1 TABLET ORAL at 22:39

## 2020-03-22 RX ADMIN — AZITHROMYCIN MONOHYDRATE 500 MG: 500 INJECTION, POWDER, LYOPHILIZED, FOR SOLUTION INTRAVENOUS at 14:21

## 2020-03-22 RX ADMIN — CEFTRIAXONE 1 G: 1 INJECTION, POWDER, FOR SOLUTION INTRAMUSCULAR; INTRAVENOUS at 09:13

## 2020-03-22 RX ADMIN — ASPIRIN 81 MG: 81 TABLET, COATED ORAL at 09:14

## 2020-03-22 RX ADMIN — MONTELUKAST 10 MG: 10 TABLET, FILM COATED ORAL at 22:39

## 2020-03-22 RX ADMIN — ALBUTEROL SULFATE 2 PUFF: 90 AEROSOL, METERED RESPIRATORY (INHALATION) at 11:28

## 2020-03-22 RX ADMIN — ALBUTEROL SULFATE 2 PUFF: 90 AEROSOL, METERED RESPIRATORY (INHALATION) at 19:16

## 2020-03-22 RX ADMIN — THEOPHYLLINE ANHYDROUS 100 MG: 100 CAPSULE, EXTENDED RELEASE ORAL at 09:14

## 2020-03-22 RX ADMIN — MELATONIN 6 MG: at 22:39

## 2020-03-22 RX ADMIN — TRAZODONE HYDROCHLORIDE 50 MG: 50 TABLET ORAL at 22:43

## 2020-03-22 RX ADMIN — GUAIFENESIN 600 MG: 600 TABLET, EXTENDED RELEASE ORAL at 22:39

## 2020-03-22 RX ADMIN — GUAIFENESIN 600 MG: 600 TABLET, EXTENDED RELEASE ORAL at 09:14

## 2020-03-22 RX ADMIN — PREDNISONE 40 MG: 20 TABLET ORAL at 09:14

## 2020-03-22 RX ADMIN — PANTOPRAZOLE SODIUM 40 MG: 40 TABLET, DELAYED RELEASE ORAL at 09:14

## 2020-03-22 RX ADMIN — CLONAZEPAM 1 MG: 1 TABLET ORAL at 09:39

## 2020-03-22 NOTE — PROGRESS NOTES
Hospitalist Progress Note      Name:  Jennifer Garcia /Age/Sex: 1945  (75 y.o. female)   MRN & CSN:  [id number] & [id number] Admission Date/Time: 3/18/2020 10:24 AM   Location:  [address] [place] [address] PCP: Rakesh Hay MD         Hospital Day: 5    Assessment and Plan:     Jacinto Johnson a 75 y. o.  female  who presents with cough and shortness of breath     Acute COPD exacerbation   CT Chest: peribronchial thickening in the bilateral lower lungs as well as tree in bud nodularity in the Lt lower lobe  Continue with IV antibiotics, DuoNeb, IV Solu-Medrol   Resume home Singulair, theophylline,   Pulmonary on board     Probable Atypical Pneumonia  Started on IV azithromycin and Rocephin; will dc after completion for 5 days  CRP and Procal both not elevated  RDP positive for Human Metapneumonia  Low suspicion for COVID-19 but test is pending      Acute Hypoxic Respiratory failure 2/2 above  -Started 4L of O2  -Continue weaning off as tolerated  -Continue COPD and PNA management     Severe PEM-counseling, consult dietary     Tobacco abuse -NicoDerm patch, counseling on smoking cessation     Chronic conditions addressed  Hypertension  Depression  Hx of recurrent small bowel obstruction       Diet DIET GENERAL;  Dietary Nutrition Supplements: Standard High Calorie Oral Supplement   DVT Prophylaxis [] Lovenox, []  Heparin, [] SCDs, [] Ambulation   GI Prophylaxis [] PPI,  [] H2 Blocker,  [] Carafate,  [] Diet/Tube Feeds   Code Status Full Code   Disposition Home   MDM      History of Present Illness:     Patient was seen and examined  Denied worsening SOB  No fever, chills, N/V/D  No dizziness, palpitations or chest pain    Ten point ROS reviewed negative, unless as noted above    Objective:        Intake/Output Summary (Last 24 hours) at 3/22/2020 0910  Last data filed at 3/21/2020 1506  Gross per 24 hour   Intake 240 ml   Output --   Net 240 ml      Vitals:   Vitals:    20 0900   BP: (!) 162/77   Pulse: 99 Resp: 16   Temp: 98.2 °F (36.8 °C)   SpO2:      Physical Exam:   GEN Awake female, sitting upright in bed in no apparent distress. Appears given age. EYES Pupils are equally round. No scleral erythema, discharge, or conjunctivitis. HENT Mucous membranes are moist. Oral pharynx without exudates, no evidence of thrush. NECK Supple, no apparent thyromegaly or masses. RESP Mild wheezes bilaterally. Symmetric chest movement while on 4L of O2.  CARDIO/VASC S1/S2 auscultated. Regular rate without appreciable murmurs, rubs, or gallops. No JVD or carotid bruits. Peripheral pulses equal bilaterally and palpable. No peripheral edema. GI Abdomen is soft without significant tenderness, masses, or guarding. Bowel sounds are normoactive. Rectal exam deferred.  No costovertebral angle tenderness. Normal appearing external genitalia. Bell catheter is not present. HEME/LYMPH No palpable cervical lymphadenopathy and no hepatosplenomegaly. No petechiae or ecchymoses. MSK No gross joint deformities. SKIN Normal coloration, warm, dry. NEURO Cranial nerves appear grossly intact, normal speech, no lateralizing weakness. PSYCH Awake, alert, oriented x 4. Affect appropriate.     Medications:   Medications:    albuterol sulfate HFA  2 puff Inhalation 4x daily    ipratropium  2 puff Inhalation 4x daily    theophylline  100 mg Oral TID    guaiFENesin  600 mg Oral BID    aspirin  81 mg Oral Daily    traZODone  50 mg Oral Nightly    pantoprazole  40 mg Oral Daily    montelukast  10 mg Oral Nightly    melatonin  6 mg Oral Nightly    cetirizine  10 mg Oral Daily    sodium chloride flush  10 mL Intravenous 2 times per day    predniSONE  40 mg Oral Daily    cefTRIAXone (ROCEPHIN) IV  1 g Intravenous Q24H    azithromycin  500 mg Intravenous Q24H    heparin (porcine)  5,000 Units Subcutaneous BID      Infusions:    sodium chloride 50 mL/hr at 03/21/20 0555     PRN Meds: clonazePAM, 1 mg, BID PRN  sodium chloride, 1 spray, PRN  sodium chloride flush, 10 mL, PRN  acetaminophen, 650 mg, Q6H PRN    Or  acetaminophen, 650 mg, Q6H PRN  polyethylene glycol, 17 g, Daily PRN  promethazine, 12.5 mg, Q6H PRN    Or  ondansetron, 4 mg, Q6H PRN          Electronically signed by Renu Alegria MD on 3/22/2020 at 9:10 AM

## 2020-03-23 LAB
CULTURE: NORMAL
Lab: NORMAL
SPECIMEN: NORMAL

## 2020-03-23 PROCEDURE — 6360000002 HC RX W HCPCS: Performed by: NURSE PRACTITIONER

## 2020-03-23 PROCEDURE — 94761 N-INVAS EAR/PLS OXIMETRY MLT: CPT

## 2020-03-23 PROCEDURE — 2580000003 HC RX 258: Performed by: NURSE PRACTITIONER

## 2020-03-23 PROCEDURE — 6370000000 HC RX 637 (ALT 250 FOR IP): Performed by: INTERNAL MEDICINE

## 2020-03-23 PROCEDURE — 1200000000 HC SEMI PRIVATE

## 2020-03-23 PROCEDURE — 6370000000 HC RX 637 (ALT 250 FOR IP): Performed by: NURSE PRACTITIONER

## 2020-03-23 PROCEDURE — 2700000000 HC OXYGEN THERAPY PER DAY

## 2020-03-23 PROCEDURE — 94640 AIRWAY INHALATION TREATMENT: CPT

## 2020-03-23 RX ADMIN — SODIUM CHLORIDE 50 ML/HR: 9 INJECTION, SOLUTION INTRAVENOUS at 08:50

## 2020-03-23 RX ADMIN — THEOPHYLLINE ANHYDROUS 100 MG: 100 CAPSULE, EXTENDED RELEASE ORAL at 12:20

## 2020-03-23 RX ADMIN — ALBUTEROL SULFATE 2 PUFF: 90 AEROSOL, METERED RESPIRATORY (INHALATION) at 22:09

## 2020-03-23 RX ADMIN — CEFTRIAXONE 1 G: 1 INJECTION, POWDER, FOR SOLUTION INTRAMUSCULAR; INTRAVENOUS at 12:10

## 2020-03-23 RX ADMIN — CETIRIZINE HYDROCHLORIDE 10 MG: 10 TABLET, FILM COATED ORAL at 08:50

## 2020-03-23 RX ADMIN — GUAIFENESIN 600 MG: 600 TABLET, EXTENDED RELEASE ORAL at 08:49

## 2020-03-23 RX ADMIN — ALBUTEROL SULFATE 2 PUFF: 90 AEROSOL, METERED RESPIRATORY (INHALATION) at 11:20

## 2020-03-23 RX ADMIN — CLONAZEPAM 1 MG: 1 TABLET ORAL at 23:02

## 2020-03-23 RX ADMIN — PREDNISONE 40 MG: 20 TABLET ORAL at 08:50

## 2020-03-23 RX ADMIN — IPRATROPIUM BROMIDE 2 PUFF: 17 AEROSOL, METERED RESPIRATORY (INHALATION) at 22:09

## 2020-03-23 RX ADMIN — THEOPHYLLINE ANHYDROUS 100 MG: 100 CAPSULE, EXTENDED RELEASE ORAL at 08:50

## 2020-03-23 RX ADMIN — GUAIFENESIN 600 MG: 600 TABLET, EXTENDED RELEASE ORAL at 22:56

## 2020-03-23 RX ADMIN — MELATONIN 6 MG: at 22:56

## 2020-03-23 RX ADMIN — IPRATROPIUM BROMIDE 2 PUFF: 17 AEROSOL, METERED RESPIRATORY (INHALATION) at 15:06

## 2020-03-23 RX ADMIN — IPRATROPIUM BROMIDE 2 PUFF: 17 AEROSOL, METERED RESPIRATORY (INHALATION) at 07:16

## 2020-03-23 RX ADMIN — ALBUTEROL SULFATE 2 PUFF: 90 AEROSOL, METERED RESPIRATORY (INHALATION) at 07:16

## 2020-03-23 RX ADMIN — CLONAZEPAM 1 MG: 1 TABLET ORAL at 12:20

## 2020-03-23 RX ADMIN — PANTOPRAZOLE SODIUM 40 MG: 40 TABLET, DELAYED RELEASE ORAL at 08:50

## 2020-03-23 RX ADMIN — ALBUTEROL SULFATE 2 PUFF: 90 AEROSOL, METERED RESPIRATORY (INHALATION) at 15:06

## 2020-03-23 RX ADMIN — IPRATROPIUM BROMIDE 2 PUFF: 17 AEROSOL, METERED RESPIRATORY (INHALATION) at 11:20

## 2020-03-23 RX ADMIN — THEOPHYLLINE ANHYDROUS 100 MG: 100 CAPSULE, EXTENDED RELEASE ORAL at 22:56

## 2020-03-23 RX ADMIN — MONTELUKAST 10 MG: 10 TABLET, FILM COATED ORAL at 22:56

## 2020-03-23 RX ADMIN — AZITHROMYCIN MONOHYDRATE 500 MG: 500 INJECTION, POWDER, LYOPHILIZED, FOR SOLUTION INTRAVENOUS at 12:56

## 2020-03-23 RX ADMIN — ASPIRIN 81 MG: 81 TABLET, COATED ORAL at 08:50

## 2020-03-23 RX ADMIN — TRAZODONE HYDROCHLORIDE 50 MG: 50 TABLET ORAL at 22:57

## 2020-03-23 ASSESSMENT — PAIN DESCRIPTION - PAIN TYPE: TYPE: ACUTE PAIN

## 2020-03-23 ASSESSMENT — PAIN SCALES - GENERAL: PAINLEVEL_OUTOF10: 0

## 2020-03-23 NOTE — PROGRESS NOTES
3/23/2020 11:48 AM  Patient Room #: 2501/3000-T  Patient Name: Jessie Castillo    (Step 1 Done by RN if possible otherwise call Pulmonary Diagnostics)  1. Place patient on room air at rest for at least 30 minutes. If patient falls below 88% before 30 minutes then you can record the level and stop. Record room air saturation level 85 %. If patient is at 88% or below, they will qualify for home oxygen and you can stop. If level does not fall below 88%, fill in level above. If indicated continue to Step 2. Signature:_Marychuy Granado, RRT__  Date: _3/23/2020__  (Step 2&3 Done by [facility])  2. Ambulate patient on room air until saturation falls below 89%. Record level of room air saturation with ambulation_85 %. Next, place patient back on _3_lpm oxygen and ambulate, record level 93_%. (Note:  this level must show improvement from room air level done with ambulation.)  If patients saturation on room air with ambulation is 88% or below AND patient shows improvement with oxygen during ambulation, they will qualify for home oxygen and you can stop. If patient does not drop below 89%, then patient should have an overnight oximetry trending on room air to see if level falls below 88%. Complete level in Step 3 below. 3. Room air overnight oximetry level 88 % for___  cumulative minutes. If patients room air oxygen level is < 89% for at least 5 cumulative minutes, patient will qualify for home oxygen and you can stop. (Attach Night Trending Report)    Complete order below: Diagnosis:__COPD, Emphysema____  Home oxygen at:  Length of Need: ? Lifetime ?  3 Months     _3_lpm or __%   via  [x] nasal cannula  []mask  [] other:___         [x]continuous [x]  with activity  [x]  Nocturnal   [x] Portable Tanks [x]  Concentrator        Therapist Signature:_Marychuy Granado, RRT_     Date:  _3/23/2020_  Physician Signature:  __Electronically Signed in EMR_    Date: ____  Physician Printed Name:  Sandra Kinsey Mingo Vázquez MD  NPI:  [id number]    [x] Patient Qualifies      [] Patient Does NOT qualify

## 2020-03-23 NOTE — PROGRESS NOTES
consume at least 50-75% of meals and supplement during stay     · Monitoring: Meal Intake, Supplement Intake, Diet Tolerance, Pertinent Labs, Weight      Electronically signed by Carl Salinas RD, PAUL on 3/23/20 at 12:00 PM EDT    Contact Number: [phone]

## 2020-03-23 NOTE — PROGRESS NOTES
Pulmonary and Critical Care  Progress Note    Subjective: The patient is better. Shortness of breath has improved. Chest pain none  Addressing respiratory complaints Patient is negative for  hemoptysis and cyanosis  CONSTITUTIONAL:  negative for fevers and chills      Past Medical History:     has a past medical history of Former smoker, Hypertension, Moderate COPD (chronic obstructive pulmonary disease) ([facility] [address] and Other emphysema ([address] has a past surgical history that includes Hysterectomy; Cholecystectomy; Abdomen surgery; and other surgical history (07/16/2018). reports that she has been smoking cigarettes. She has a 25.00 pack-year smoking history. She has never used smokeless tobacco. She reports that she does not drink alcohol or use drugs. Family history:  family history includes Cancer in her father; Diabetes in her brother and mother; Heart Disease in her mother. Allergies   Allergen Reactions    Pravastatin Sodium Other (See Comments)     Myalgias (note from 5/15/18)     Social History:    Reviewed; no changes    Objective:   PHYSICAL EXAM:        VITALS:  BP (!) 151/79   Pulse 75   Temp 98.1 °F (36.7 °C) (Oral)   Resp 18   Ht 5' 2\" (1.575 m)   Wt 95 lb 8 oz (43.3 kg)   SpO2 95%   BMI 17.47 kg/m²     24HR INTAKE/OUTPUT:    No intake or output data in the 24 hours ending 03/23/20 1031    CONSTITUTIONAL:  awake, alert, cooperative, no apparent distress, and appears stated age  LUNGS:  Moving air better. CARDIOVASCULAR:  normal S1 and S2 and negative JVD  ABD:Abdomen soft, non-tender. BS normal. No masses,  No organomegaly  NEURO:Alert and oriented x3. Gait normal. Reflexes and motor strength normal and symmetric. Cranial nerves 2-12 and sensation grossly intact.   DATA:    CBC:  Recent Labs     03/22/20  0254   WBC 4.5   RBC 3.81*   HGB 11.2*   HCT 35.8*      MCV 94.0   MCH 29.4   MCHC 31.3*   RDW 13.9   SEGSPCT 58.2      BMP:  Recent Labs     03/22/20  0254      K 3.9   CL 102   CO2 34*   BUN 15   CREATININE 0.7   CALCIUM 8.5   GLUCOSE 103*      ABG:  No results for input(s): PH, PO2ART, DLA4LKC, HCO3, BEART, O2SAT in the last 72 hours. Lab Results   Component Value Date    PROBNP 71.80 03/18/2020    THEOPH 5.0 (L) 03/22/2020     No results found for: [facility]    Radiology Review:  Pertinent images / reports were reviewed as a part of this visit. Assessment:     Patient Active Problem List   Diagnosis    Essential hypertension    Mixed hyperlipidemia    Anxiety    Primary insomnia    Lung nodule    Other emphysema ([address]    Former smoker    Chronic obstructive pulmonary disease ([address]    Vaginal dryness    H/O total hysterectomy    Vaginal atrophy    Leg cramping    Bowel obstruction (HCC)    SBO (small bowel obstruction) (HCC)    Adynamic ileus ([address]    Tobacco abuse    Lower abdominal pain    Nausea    Abnormal CT of the abdomen    Ileus ([address]    Gastroesophageal reflux disease without esophagitis    Anxiety and depression    Poor appetite    Acute on chronic respiratory failure with hypoxia ([address]       Plan:   1. Overall the patient has improved. 2. Inc. activity. 3. Check Covid results.   4. Home O2  eval.  Millie Sevilla MD  3/23/2020  10:31 AM

## 2020-03-23 NOTE — PROGRESS NOTES
Called and faxed preliminary paperwork for Home O2 to [address]. They are aware pt is planned for discharge tomorrow.

## 2020-03-23 NOTE — PROGRESS NOTES
GEN Awake female, sitting upright in bed in no apparent distress. Appears given age. EYES Pupils are equally round. No scleral erythema, discharge, or conjunctivitis. HENT Mucous membranes are moist. Oral pharynx without exudates, no evidence of thrush. NECK Supple, no apparent thyromegaly or masses. RESP Clear to auscultation, no wheezes, rales or rhonchi. Symmetric chest movement while on 4L of O2.  CARDIO/VASC S1/S2 auscultated. Regular rate without appreciable murmurs, rubs, or gallops. No JVD or carotid bruits. Peripheral pulses equal bilaterally and palpable. No peripheral edema. GI Abdomen is soft without significant tenderness, masses, or guarding. Bowel sounds are normoactive. Rectal exam deferred.  No costovertebral angle tenderness. Normal appearing external genitalia. Bell catheter is not present. HEME/LYMPH No palpable cervical lymphadenopathy and no hepatosplenomegaly. No petechiae or ecchymoses. MSK No gross joint deformities. SKIN Normal coloration, warm, dry. NEURO Cranial nerves appear grossly intact, normal speech, no lateralizing weakness. PSYCH Awake, alert, oriented x 4. Affect appropriate.     Medications:   Medications:    albuterol sulfate HFA  2 puff Inhalation 4x daily    ipratropium  2 puff Inhalation 4x daily    theophylline  100 mg Oral TID    guaiFENesin  600 mg Oral BID    aspirin  81 mg Oral Daily    traZODone  50 mg Oral Nightly    pantoprazole  40 mg Oral Daily    montelukast  10 mg Oral Nightly    melatonin  6 mg Oral Nightly    cetirizine  10 mg Oral Daily    sodium chloride flush  10 mL Intravenous 2 times per day    predniSONE  40 mg Oral Daily    cefTRIAXone (ROCEPHIN) IV  1 g Intravenous Q24H    azithromycin  500 mg Intravenous Q24H    heparin (porcine)  5,000 Units Subcutaneous BID      Infusions:    sodium chloride 50 mL/hr (03/23/20 0850)     PRN Meds: clonazePAM, 1 mg, BID PRN  sodium chloride, 1 spray, PRN  sodium chloride flush, 10 mL, PRN  acetaminophen, 650 mg, Q6H PRN    Or  acetaminophen, 650 mg, Q6H PRN  polyethylene glycol, 17 g, Daily PRN  promethazine, 12.5 mg, Q6H PRN    Or  ondansetron, 4 mg, Q6H PRN          Electronically signed by Jeancarlos Patel MD on 3/23/2020 at 10:04 AM

## 2020-03-23 NOTE — PROGRESS NOTES
Patient was seen in hospital for COPD, Emphysema. I am prescribing oxygen because the diagnosis and testing requires the patient to have oxygen in the home. Conditions will improve or be benefited by oxygen use. The patient is able to perform good mobility and therefore requires the use of a portable oxygen system for ambulation.

## 2020-03-24 VITALS
OXYGEN SATURATION: 95 % | DIASTOLIC BLOOD PRESSURE: 71 MMHG | TEMPERATURE: 97.8 F | RESPIRATION RATE: 20 BRPM | WEIGHT: 93.1 LBS | HEART RATE: 98 BPM | SYSTOLIC BLOOD PRESSURE: 146 MMHG | BODY MASS INDEX: 17.13 KG/M2 | HEIGHT: 62 IN

## 2020-03-24 LAB
SARS-COV-2: NORMAL
SOURCE: NORMAL
SOURCE: NORMAL

## 2020-03-24 PROCEDURE — 2580000003 HC RX 258: Performed by: NURSE PRACTITIONER

## 2020-03-24 PROCEDURE — 6370000000 HC RX 637 (ALT 250 FOR IP): Performed by: INTERNAL MEDICINE

## 2020-03-24 PROCEDURE — 6370000000 HC RX 637 (ALT 250 FOR IP): Performed by: NURSE PRACTITIONER

## 2020-03-24 PROCEDURE — 6360000002 HC RX W HCPCS: Performed by: NURSE PRACTITIONER

## 2020-03-24 PROCEDURE — 2700000000 HC OXYGEN THERAPY PER DAY

## 2020-03-24 PROCEDURE — 94761 N-INVAS EAR/PLS OXIMETRY MLT: CPT

## 2020-03-24 PROCEDURE — 94640 AIRWAY INHALATION TREATMENT: CPT

## 2020-03-24 RX ADMIN — THEOPHYLLINE ANHYDROUS 100 MG: 100 CAPSULE, EXTENDED RELEASE ORAL at 10:05

## 2020-03-24 RX ADMIN — ALBUTEROL SULFATE 2 PUFF: 90 AEROSOL, METERED RESPIRATORY (INHALATION) at 08:29

## 2020-03-24 RX ADMIN — CEFTRIAXONE 1 G: 1 INJECTION, POWDER, FOR SOLUTION INTRAMUSCULAR; INTRAVENOUS at 13:35

## 2020-03-24 RX ADMIN — THEOPHYLLINE ANHYDROUS 100 MG: 100 CAPSULE, EXTENDED RELEASE ORAL at 14:23

## 2020-03-24 RX ADMIN — IPRATROPIUM BROMIDE 2 PUFF: 17 AEROSOL, METERED RESPIRATORY (INHALATION) at 15:48

## 2020-03-24 RX ADMIN — ASPIRIN 81 MG: 81 TABLET, COATED ORAL at 10:04

## 2020-03-24 RX ADMIN — IPRATROPIUM BROMIDE 2 PUFF: 17 AEROSOL, METERED RESPIRATORY (INHALATION) at 08:31

## 2020-03-24 RX ADMIN — IPRATROPIUM BROMIDE 2 PUFF: 17 AEROSOL, METERED RESPIRATORY (INHALATION) at 11:52

## 2020-03-24 RX ADMIN — ALBUTEROL SULFATE 2 PUFF: 90 AEROSOL, METERED RESPIRATORY (INHALATION) at 11:50

## 2020-03-24 RX ADMIN — CETIRIZINE HYDROCHLORIDE 10 MG: 10 TABLET, FILM COATED ORAL at 10:04

## 2020-03-24 RX ADMIN — PANTOPRAZOLE SODIUM 40 MG: 40 TABLET, DELAYED RELEASE ORAL at 10:04

## 2020-03-24 RX ADMIN — PREDNISONE 30 MG: 20 TABLET ORAL at 10:04

## 2020-03-24 RX ADMIN — ALBUTEROL SULFATE 2 PUFF: 90 AEROSOL, METERED RESPIRATORY (INHALATION) at 15:45

## 2020-03-24 RX ADMIN — GUAIFENESIN 600 MG: 600 TABLET, EXTENDED RELEASE ORAL at 10:04

## 2020-03-24 NOTE — DISCHARGE SUMMARY
Discharge Summary    Name:  Iván Thomas /Age/Sex: 1945  (75 y.o. female)   MRN & CSN:  [id number] & [id number] Admission Date/Time: 3/18/2020 10:24 AM   Attending:  Damien Parada MD;Am* Discharging Physician: Natalia David DO     HPI and Hospital Course:   Iván Thomas is a 75 y.o.  female  who presents with Shortness of Breath and Cough    · Acute COPDE  · Probably atypical pneumonia, COVID NEGATIVE, +HMP  · Acute respiratory failure, hypoxic. Needed 4L on admission. Titrated down to 3L before discharge. She was given home oxygen prior to discharge  · Severe Protein malnutrition, talked with dietary  · Chronic tobacco abuse, nicoderm patch  · Htn, controlled  · Depression, chronic  · Hx recurrent small bowel obstruction    The patient expressed appropriate understanding of and agreement with the discharge recommendations, medications, and plan. Consults this admission:  IP CONSULT TO HOSPITALIST  IP CONSULT TO PULMONOLOGY  IP CONSULT TO [address] [place] [address] [address]    Discharge Instruction:   Follow up appointments: pulm  Primary care physician:  within 2 weeks    Diet:  General/cardiac/ADA/as tolerated  Activity: {discharge activity: as tolerated  Disposition: Discharged to:   [x]Home, []C, []SNF, []Acute Rehab, []Hospice   Condition on discharge: Stable    Discharge Medications:      Sukhjinder Sans"   Home Medication Instructions NWN:[id number]    Printed on:20 0817   Medication Information                      albuterol sulfate  (90 Base) MCG/ACT inhaler  USE 2 INHALATIONS ORALLY INTO THE LUNGS EVERY 6 HOURS AS NEEDED FOR WHEEZING OR SHORTNESS OF BREATH             aspirin 81 MG tablet  Take 81 mg by mouth daily.              clonazePAM (KLONOPIN) 1 MG tablet  TAKE 1 TABLET TWICE A DAY  AS NEEDED FOR ANXIETY             loratadine (EQ ALLERGY RELIEF) 10 MG tablet  Take 10 mg by mouth daily             melatonin 5 MG TABS tablet  Take 5 mg by mouth nightly montelukast (SINGULAIR) 10 MG tablet  Take 1 tablet by mouth nightly             pantoprazole (PROTONIX) 40 MG tablet  Take 40 mg by mouth daily             sodium chloride (OCEAN, BABY AYR) 0.65 % nasal spray  1 spray by Nasal route as needed for Congestion             traZODone (DESYREL) 50 MG tablet  Take 1 tablet by mouth nightly                 Objective Findings at Discharge:   BP (!) 146/71   Pulse 96   Temp 97.8 °F (36.6 °C) (Oral)   Resp 20   Ht 5' 2\" (1.575 m)   Wt 93 lb 1.6 oz (42.2 kg)   SpO2 94%   BMI 17.03 kg/m²            PHYSICAL EXAM   GEN Awake female, laying in bed in no apparent distress. Appears given age. EYES Pupils are equally round. No scleral discharge  HENT Atraumatic and symmetric head  NECK No apparent thyromegaly  RESP Symmetric chest movement while on room air. CARDIO/VASC Peripheral pulses equal bilaterally and palpable. No peripheral edema. GI Abdomen is not distended. Rectal exam deferred.  Bell catheter is not present. HEME/LYMPH No petechiae or ecchymoses. MSK Spontaneous movement of BL upper extremities. Low muscle tone and sq fat  SKIN Normal coloration, warm, dry. NEURO Cranial nerves appear grossly intact  PSYCH Awake, alert. BMP/CBC  Recent Labs     03/22/20  0254      K 3.9      CO2 34*   BUN 15   CREATININE 0.7   WBC 4.5   HCT 35.8*        SIGNIFICANT IMAGING AND LABS:  Impression:  ct head wo contrast      1.  Peribronchial thickening in the bilateral lower lungs can be seen with  bronchiolitis. 2.  Emphysema. 3.  Tree-in-bud nodularity in the left lower lobe laterally may be due to  atypical infection.      Discharge Time of 36 minutes    Electronically signed by Michelle Trujillo DO on 3/24/2020 at 12:45 PM

## 2020-03-24 NOTE — PLAN OF CARE
Problem: Falls - Risk of:  Goal: Will remain free from falls  Description: Will remain free from falls  3/24/2020 0648 by Brigid Lindo LPN  Outcome: Ongoing  3/24/2020 0500 by Brigid Lindo LPN  Outcome: Ongoing  Goal: Absence of physical injury  Description: Absence of physical injury  3/24/2020 0648 by Brigid Lindo LPN  Outcome: Ongoing  3/24/2020 0500 by Brigid Lindo LPN  Outcome: Ongoing     Problem: Nutritional:  Goal: Nutritional status will improve  Description: Nutritional status will improve  3/24/2020 0648 by Brigid Lindo LPN  Outcome: Ongoing  3/24/2020 0500 by Brigid Lindo LPN  Outcome: Ongoing     Problem: Physical Regulation:  Goal: Diagnostic test results will improve  Description: Diagnostic test results will improve  3/24/2020 0648 by Brigid Lindo LPN  Outcome: Ongoing  3/24/2020 0500 by Brigid Lnido LPN  Outcome: Ongoing  Goal: Will remain free from infection  Description: Will remain free from infection  3/24/2020 0648 by Brigid Lindo LPN  Outcome: Ongoing  3/24/2020 0500 by Brigid Lindo LPN  Outcome: Ongoing  Goal: Ability to maintain vital signs within normal range will improve  Description: Ability to maintain vital signs within normal range will improve  3/24/2020 0648 by Brigid Lindo LPN  Outcome: Ongoing  3/24/2020 0500 by Brigid Lindo LPN  Outcome: Ongoing     Problem: Respiratory:  Goal: Ability to maintain normal respiratory secretions will improve  Description: Ability to maintain normal respiratory secretions will improve  3/24/2020 0648 by Brigid Lindo LPN  Outcome: Ongoing  3/24/2020 0500 by Brigid Lindo LPN  Outcome: Ongoing     Problem: Breathing Pattern - Ineffective:  Goal: Ability to achieve and maintain a regular respiratory rate will improve  Description: Ability to achieve and maintain a regular respiratory rate will improve  3/24/2020 0648 by Brigid Lindo LPN  Outcome: Ongoing  3/24/2020 0500 by Brigid Lindo LPN  Outcome: Ongoing     Problem: Nutrition  Goal: Optimal nutrition therapy  3/24/2020 0648 by Carlos Mckeon LPN  Outcome: Ongoing  3/24/2020 0500 by Carlos Mckeon LPN  Outcome: Ongoing

## 2020-03-24 NOTE — ADT AUTH CERT
as tolerated; might need home O2 if weaning fails   -Continue COPD and PNA management       Severe PEM-counseling, consult dietary       Tobacco abuse -NicoDerm patch, counseling on smoking cessation       Chronic conditions addressed   Hypertension   Depression   Hx of recurrent small bowel obstruction          COVID19 by Moon Smith RN         Review Status Review Entered   In Primary 3/23/2020 12:12       Criteria Review   3/23 Denied any worsening SOB  No CP, dizziness, palpitations  No fever, chills, N/V/D     Assessment and Plan:   Santa Singer a 75 y. o.  female  who presents with cough and shortness of breath     Acute COPD exacerbation   -CT Chest: peribronchial thickening in the bilateral lower lungs as well as tree in bud nodularity in the Lt lower lobe  -Complete 5 days of IV antibiotics.  Continue breathing treatment, IV Solu-Medrol   -Resume home Singulair, theophylline,   -Pulmonary on board     Probable Atypical Pneumonia  -Started on IV azithromycin and Rocephin; will dc after completion for 5 days  -CRP and Procal both not elevated  -RDP positive for Human Metapneumonia  -Low suspicion for COVID-19 but test is pending      Acute Hypoxic Respiratory failure 2/2 above  -Started 4L of O2  -Continue weaning off as tolerated; might need home O2 if weaning fails  -Continue COPD and PNA management     Severe PEM-counseling, consult dietary     Tobacco abuse -NicoDerm patch, counseling on smoking cessation     Chronic conditions addressed  Hypertension  Depression  Hx of recurrent small bowel obstruction

## 2020-03-24 NOTE — PROGRESS NOTES
3/24/2020 9:54 AM  Patient Room #: 8145/1696-P  Patient Name: Mandy Jarvis    (Step 1 Done by RN if possible otherwise call Pulmonary Diagnostics)  1. Place patient on room air at rest for at least 30 minutes. If patient falls below 88% before 30 minutes then you can record the level and stop. Record room air saturation level 85 %. If patient is at 88% or below, they will qualify for home oxygen and you can stop. If level does not fall below 88%, fill in level above. If indicated continue to Step 2. Signature:_Marychuy Inman, RRT__  Date: _3/23/2020__  (Step 2&3 Done by P)  2. Ambulate patient on room air until saturation falls below 89%. Record level of room air saturation with ambulation_85 %. Next, place patient back on _3_lpm oxygen and ambulate, record level 93_%. (Note:  this level must show improvement from room air level done with ambulation.)  If patients saturation on room air with ambulation is 88% or below AND patient shows improvement with oxygen during ambulation, they will qualify for home oxygen and you can stop. If patient does not drop below 89%, then patient should have an overnight oximetry trending on room air to see if level falls below 88%. Complete level in Step 3 below. 3. Room air overnight oximetry level 88 % for___  cumulative minutes. If patients room air oxygen level is < 89% for at least 5 cumulative minutes, patient will qualify for home oxygen and you can stop. (Attach Night Trending Report)    Complete order below: Diagnosis:__COPD, Emphysema____  Home oxygen at:  Length of Need: ? Lifetime ?  3 Months     _3_lpm or __%   via  [x] nasal cannula  []mask  [] other:___         [x]continuous [x]  with activity  [x]  Nocturnal   [x] Portable Tanks [x]  Concentrator        Therapist Signature:_Marychuy Inman, RRT_     Date:  _3/23/2020_  Physician Signature:  __Electronically Signed in EMR_    Date: ____  Physician Printed Name:  Laura Castanon

## 2020-03-24 NOTE — PROGRESS NOTES
[facility] called and made aware of patient being discharged  Please make sure patient has her portable O2 tank from May Ansari before she is discharged

## 2020-03-25 ENCOUNTER — CARE COORDINATION (OUTPATIENT)
Dept: CASE MANAGEMENT | Age: 75
End: 2020-03-25

## 2020-03-25 ENCOUNTER — TELEPHONE (OUTPATIENT)
Dept: FAMILY MEDICINE CLINIC | Age: 75
End: 2020-03-25

## 2020-03-25 LAB
EKG ATRIAL RATE: 90 BPM
EKG DIAGNOSIS: NORMAL
EKG P AXIS: 76 DEGREES
EKG P-R INTERVAL: 120 MS
EKG Q-T INTERVAL: 344 MS
EKG QRS DURATION: 68 MS
EKG QTC CALCULATION (BAZETT): 420 MS
EKG R AXIS: 83 DEGREES
EKG T AXIS: 70 DEGREES
EKG VENTRICULAR RATE: 90 BPM

## 2020-03-26 ENCOUNTER — CARE COORDINATION (OUTPATIENT)
Dept: CASE MANAGEMENT | Age: 75
End: 2020-03-26

## 2020-03-26 ENCOUNTER — TELEPHONE (OUTPATIENT)
Dept: FAMILY MEDICINE CLINIC | Age: 75
End: 2020-03-26

## 2020-03-26 PROCEDURE — 1111F DSCHRG MED/CURRENT MED MERGE: CPT | Performed by: FAMILY MEDICINE

## 2020-03-26 NOTE — CARE COORDINATION
[facility] Transitions Initial Follow Up Call    Call within 2 business days of discharge: Yes    Patient: Vasquez Cheney Patient : 1945   MRN: [id number]  Reason for Admission  SOB, cough  Discharge Date: 3/24/20 RARS: Readmission Risk Score: 13      Last Discharge [facility]       Complaint Diagnosis Description Type Department Provider    3/18/20 Shortness of Breath; Cough Dyspnea, unspecified type . .. ED to Hosp-Admission (Discharged) (ADMITTED) [address] Jaqui Irvin DO; Jenny Flores. .. Spoke with: patient    Facility:  [facility]    Non-face-to-face services provided:  Assessment and support for treatment adherence and medication management-[id number]    Care Transitions 24 Hour Call    Do you have any ongoing symptoms?:  No  Do you have a copy of your discharge instructions?:  Yes  Do you have all of your prescriptions and are they filled?:  Yes  Have you been contacted by a [address]?:  No  Have you scheduled your follow up appointment?:  No  Were you discharged with any Home Care or Post Acute Services:  No  Do you feel like you have everything you need to keep you well at home?:  Yes  Care Transitions Interventions         Follow Up:  COVID-19 Screening Initial Follow-up Note    Patient contacted regarding COVID-19:  Risk  Care Transition Nurse contacted the patient by telephone to perform post discharge assessment. Verified name and  with patient as identifiers. Provided introduction to self, and explanation of the CTN role, and reason for call due to risk factors for infection and/or exposure to COVID-19. Symptoms reviewed with patient who verbalized the following symptoms: extreme fatigue, SOB , cough, HA. Denies fever, chills, nausea, diarrhea, dizziness or light headedness. Patient reports that SOB and cough are at baseline; denies worsening symptoms.  Reports that she feels a little better than when she was in the hospital.  Instructed on worsening s/s to report to MD.  Patient confirms plan for Provider follow up tomorrow. Patient has following risk factors of: COPD/ Acute on Chronic Resp. Failure with Hypoxia. CTN reviewed discharge instructions, medical action plan and red flags such as increased shortness of breath, increasing fever and signs of decompensation with patient who verbalized understanding. Discussed exposure protocols and quarantine with CDC Guidelines What to do if you are sick with coronavirus disease 2019 with patient who was given an opportunity for questions and concerns. The patient agrees to contact the [facility] exposure line [phone], [facility] ( [phone]) and PCP office for questions related to their healthcare. CTN/ACM provided contact information for future reference. Reviewed and educated patient on any new and changed medications related to discharge diagnosis. Medication reconciliation completed. Patient reports that she is taking her medications as directed and denies any questions in r/t her prescriptions. Patient confirms that she has family support and denies the need for [address]. Denies any questions, equipment or resource needs. Patient is agreeable to continued Care Transition. Confirmed CTN contact information. Encouraged call back if needs arise. Plan for follow-up call in 5-7 days based on severity of symptoms and risk factors.   Future Appointments   Date Time Provider Maria Del Carmen Carson   3/27/2020 10:30 AM Sherine Lam MD [facility]   4/2/2020 12:30 PM [facility]   4/17/2020  1:00 PM Sherine Lam MD [facility]   10/29/2020  1:30 PM Sherine Lam MD [facility]       Amaury Sliva RN

## 2020-04-03 ENCOUNTER — CARE COORDINATION (OUTPATIENT)
Dept: CASE MANAGEMENT | Age: 75
End: 2020-04-03

## 2020-04-10 ENCOUNTER — CARE COORDINATION (OUTPATIENT)
Dept: CASE MANAGEMENT | Age: 75
End: 2020-04-10

## 2020-04-14 RX ORDER — TRAZODONE HYDROCHLORIDE 50 MG/1
TABLET ORAL
Qty: 90 TABLET | Refills: 3 | Status: SHIPPED | OUTPATIENT
Start: 2020-04-14 | End: 2021-02-23 | Stop reason: SDUPTHER

## 2020-04-17 ENCOUNTER — VIRTUAL VISIT (OUTPATIENT)
Dept: FAMILY MEDICINE CLINIC | Age: 75
End: 2020-04-17
Payer: MEDICARE

## 2020-04-17 PROCEDURE — G2012 BRIEF CHECK IN BY MD/QHP: HCPCS | Performed by: FAMILY MEDICINE

## 2020-04-17 NOTE — PROGRESS NOTES
Sexual Activity    Alcohol use: No    Drug use: No    Sexual activity: Not on file   Lifestyle    Physical activity     Days per week: Not on file     Minutes per session: Not on file    Stress: Not on file   Relationships    Social connections     Talks on phone: Not on file     Gets together: Not on file     Attends Jehovah's witness service: Not on file     Active member of club or organization: Not on file     Attends meetings of clubs or organizations: Not on file     Relationship status: Not on file    Intimate partner violence     Fear of current or ex partner: Not on file     Emotionally abused: Not on file     Physically abused: Not on file     Forced sexual activity: Not on file   Other Topics Concern    Not on file   Social History Narrative    Not on file       Allergies   Allergen Reactions    Pravastatin Sodium Other (See Comments)     Myalgias (note from 5/15/18)     Current Outpatient Medications   Medication Sig Dispense Refill    traZODone (DESYREL) 50 MG tablet TAKE 1 TABLET NIGHTLY 90 tablet 3    loratadine (EQ ALLERGY RELIEF) 10 MG tablet Take 10 mg by mouth daily      sodium chloride (OCEAN, BABY AYR) 0.65 % nasal spray 1 spray by Nasal route as needed for Congestion      albuterol sulfate  (90 Base) MCG/ACT inhaler USE 2 INHALATIONS ORALLY INTO THE LUNGS EVERY 6 HOURS AS NEEDED FOR WHEEZING OR SHORTNESS OF BREATH 54 g 3    clonazePAM (KLONOPIN) 1 MG tablet TAKE 1 TABLET TWICE A DAY  AS NEEDED FOR ANXIETY 60 tablet 2    pantoprazole (PROTONIX) 40 MG tablet Take 40 mg by mouth daily      montelukast (SINGULAIR) 10 MG tablet Take 1 tablet by mouth nightly 90 tablet 3    melatonin 5 MG TABS tablet Take 5 mg by mouth nightly      aspirin 81 MG tablet Take 81 mg by mouth daily. No current facility-administered medications for this visit. Review of Systems   Constitutional: Negative. HENT: Negative. Respiratory: Negative for cough and chest tightness.

## 2020-04-17 NOTE — LETTER
[address] [address] [address] [facility],[address] [place] [address]  Phone: [phone]  Fax: [phone]    Killian May MD        June 2, 2020     Patient: Amber Wells   YOB: 1945   Date of Visit: 4/17/2020       To Whom it May Concern:    Marcos Sellers was seen by [facility] on 4/17/2020. She dose not need to be on oxygen    If you have any questions or concerns, please don't hesitate to call.     Sincerely,         Killian May MD

## 2020-05-07 RX ORDER — CLONAZEPAM 1 MG/1
TABLET ORAL
Qty: 60 TABLET | Refills: 2 | Status: SHIPPED | OUTPATIENT
Start: 2020-05-07 | End: 2020-11-06

## 2020-05-26 RX ORDER — MONTELUKAST SODIUM 10 MG/1
TABLET ORAL
Qty: 90 TABLET | Refills: 3 | Status: SHIPPED | OUTPATIENT
Start: 2020-05-26 | End: 2021-06-16

## 2020-06-01 ENCOUNTER — TELEPHONE (OUTPATIENT)
Dept: FAMILY MEDICINE CLINIC | Age: 75
End: 2020-06-01

## 2020-06-29 ENCOUNTER — OFFICE VISIT (OUTPATIENT)
Dept: PRIMARY CARE CLINIC | Age: 75
End: 2020-06-29
Payer: MEDICARE

## 2020-06-29 ENCOUNTER — HOSPITAL ENCOUNTER (OUTPATIENT)
Age: 75
Setting detail: SPECIMEN
Discharge: HOME | End: 2020-06-29
Payer: MEDICARE

## 2020-06-29 VITALS — TEMPERATURE: 97.4 F | OXYGEN SATURATION: 96 % | HEART RATE: 93 BPM

## 2020-06-29 PROCEDURE — U0002 COVID-19 LAB TEST NON-CDC: HCPCS

## 2020-06-29 PROCEDURE — 99213 OFFICE O/P EST LOW 20 MIN: CPT | Performed by: FAMILY MEDICINE

## 2020-06-29 RX ORDER — AMOXICILLIN AND CLAVULANATE POTASSIUM 875; 125 MG/1; MG/1
1 TABLET, FILM COATED ORAL 2 TIMES DAILY
Qty: 20 TABLET | Refills: 0 | Status: SHIPPED | OUTPATIENT
Start: 2020-06-29 | End: 2020-09-23 | Stop reason: SDUPTHER

## 2020-06-29 RX ORDER — PREDNISONE 10 MG/1
10 TABLET ORAL DAILY
Qty: 10 TABLET | Refills: 0 | Status: SHIPPED | OUTPATIENT
Start: 2020-06-29 | End: 2020-09-23 | Stop reason: SDUPTHER

## 2020-07-02 LAB
SARS-COV-2: NOT DETECTED
SOURCE: NORMAL

## 2020-07-13 ENCOUNTER — TELEPHONE (OUTPATIENT)
Dept: FAMILY MEDICINE CLINIC | Age: 75
End: 2020-07-13

## 2020-07-13 RX ORDER — LEVOFLOXACIN 500 MG/1
500 TABLET, FILM COATED ORAL DAILY
Qty: 5 TABLET | Refills: 0 | Status: SHIPPED | OUTPATIENT
Start: 2020-07-13 | End: 2020-07-18

## 2020-07-13 NOTE — TELEPHONE ENCOUNTER
Patient was seen on 6/29/20 at the walk in clinic and was given an antibioctic for bronchitis. Patient states she needs a different once because she is still feeling bad.  Please advise

## 2020-07-14 ENCOUNTER — VIRTUAL VISIT (OUTPATIENT)
Dept: FAMILY MEDICINE CLINIC | Age: 75
End: 2020-07-14
Payer: MEDICARE

## 2020-07-14 PROCEDURE — 99441 PR PHYS/QHP TELEPHONE EVALUATION 5-10 MIN: CPT | Performed by: FAMILY MEDICINE

## 2020-07-14 ASSESSMENT — ENCOUNTER SYMPTOMS
HEMOPTYSIS: 0
WHEEZING: 1
COUGH: 1
SHORTNESS OF BREATH: 1
RHINORRHEA: 0
SORE THROAT: 0

## 2020-07-14 NOTE — PROGRESS NOTES
Nae Mccabe is a 67 y.o. female evaluated via telephone on 7/14/2020. Consent:  She and/or health care decision maker is aware that that she may receive a bill for this telephone service, depending on her insurance coverage, and has provided verbal consent to proceed: Yes       Documentation:  I communicated with the patient:      Chief Complaint   Patient presents with    Cough     x 3 wks         Patient c/o cough, x 3 wks, went to the [facility] clinic on 6/29 and was treated for bronchitis, and the covid test was negative. Cough   This is a new problem. Episode onset: 3 wks. The problem has been unchanged. The problem occurs constantly. The cough is productive of sputum. Associated symptoms include myalgias, nasal congestion, shortness of breath and wheezing. Pertinent negatives include no chest pain, chills, ear congestion, ear pain, fever, headaches, hemoptysis, postnasal drip, rhinorrhea, sore throat or weight loss. Nothing aggravates the symptoms. She has tried a beta-agonist inhaler and steroid inhaler for the symptoms. The treatment provided no relief. Her past medical history is significant for bronchitis and COPD.        Past Medical History:   Diagnosis Date    Former smoker 1/5/2018    Hypertension     Moderate COPD (chronic obstructive pulmonary disease) ([facility] [address] 1/5/2018    Other emphysema ([facility] [address] 1/5/2018     Family History   Problem Relation Age of Onset    Heart Disease Mother     Diabetes Mother     Cancer Father     Diabetes Brother      Social History     Socioeconomic History    Marital status:      Spouse name: Not on file    Number of children: Not on file    Years of education: Not on file    Highest education level: Not on file   Occupational History    Not on file   Social Needs    Financial resource strain: Not on file    Food insecurity     Worry: Not on file     Inability: Not on file    Transportation needs     Medical: Not on file     Non-medical: Not on file Tobacco Use    Smoking status: Current Every Day Smoker     Packs/day: 0.50     Years: 50.00     Pack years: 25.00     Types: Cigarettes     Last attempt to quit: 2018     Years since quittin.6    Smokeless tobacco: Never Used    Tobacco comment: Off and on smoker pt states.    Substance and Sexual Activity    Alcohol use: No    Drug use: No    Sexual activity: Not on file   Lifestyle    Physical activity     Days per week: Not on file     Minutes per session: Not on file    Stress: Not on file   Relationships    Social connections     Talks on phone: Not on file     Gets together: Not on file     Attends Alevism service: Not on file     Active member of club or organization: Not on file     Attends meetings of clubs or organizations: Not on file     Relationship status: Not on file    Intimate partner violence     Fear of current or ex partner: Not on file     Emotionally abused: Not on file     Physically abused: Not on file     Forced sexual activity: Not on file   Other Topics Concern    Not on file   Social History Narrative    Not on file       Allergies   Allergen Reactions    Pravastatin Sodium Other (See Comments)     Myalgias (note from 5/15/18)     Current Outpatient Medications   Medication Sig Dispense Refill    levoFLOXacin (LEVAQUIN) 500 MG tablet Take 1 tablet by mouth daily for 5 days 5 tablet 0    montelukast (SINGULAIR) 10 MG tablet TAKE 1 TABLET NIGHTLY 90 tablet 3    clonazePAM (KLONOPIN) 1 MG tablet TAKE 1 TABLET TWICE DAILY  AS NEEDED FOR ANXIETY 60 tablet 2    traZODone (DESYREL) 50 MG tablet TAKE 1 TABLET NIGHTLY 90 tablet 3    loratadine (EQ ALLERGY RELIEF) 10 MG tablet Take 10 mg by mouth daily      sodium chloride (OCEAN, BABY AYR) 0.65 % nasal spray 1 spray by Nasal route as needed for Congestion      albuterol sulfate  (90 Base) MCG/ACT inhaler USE 2 INHALATIONS ORALLY INTO THE LUNGS EVERY 6 HOURS AS NEEDED FOR WHEEZING OR SHORTNESS OF BREATH 54 g 3    pantoprazole (PROTONIX) 40 MG tablet Take 40 mg by mouth daily      melatonin 5 MG TABS tablet Take 5 mg by mouth nightly      aspirin 81 MG tablet Take 81 mg by mouth daily. No current facility-administered medications for this visit. Review of Systems   General:  No fevers, no chills  Eyes:  No recent vison changes  ENT:  No sore throat, no nasal congestion  Cardiovascular:  No chest pain, no palpitations  Respiratory:  No shortness of breath, no cough, no wheezing  Gastrointestinal:  No abdominal pain, no nausea, no vomiting, no diarrhea  Musculoskeletal:  No muscle pain, no joint pain  Skin:  No rash  Neurologic:  No headache or weakness  Genitourinary:  No dysuria, no hematuria  Extremities:  no edema, no pain     Physical Exam     Vital signs: by the patient    Wt 95.6, Bp 108/68, pulse 78, po2 93, temp 97.3    Per phone call, the voice sounds good, no acute distress      ASSESSMENT/ PLAN:      1. Acute bronchitis, unspecified organism  - Levaquin already send yesterday to the pharmacy          - Appropriate prescription are addressed. - Questions answered and patient verbalizes understanding.  - Call for any problem, questions, or concerns. Return if symptoms worsen or fail to improve. I affirm this is a Patient Initiated Episode with a Patient who has not had a related appointment within my department in the past 7 days or scheduled within the next 24 hours.     Patient identification was verified at the start of the visit: Yes    Total Time: minutes: 5-10 minutes    Note: not billable if this call serves to triage the patient into an appointment for the relevant concern      Joyce David

## 2020-09-23 ENCOUNTER — HOSPITAL ENCOUNTER (OUTPATIENT)
Age: 75
Setting detail: SPECIMEN
Discharge: HOME | End: 2020-09-23
Payer: MEDICARE

## 2020-09-23 ENCOUNTER — OFFICE VISIT (OUTPATIENT)
Dept: PRIMARY CARE CLINIC | Age: 75
End: 2020-09-23
Payer: MEDICARE

## 2020-09-23 VITALS — OXYGEN SATURATION: 99 % | TEMPERATURE: 98.5 F | HEART RATE: 79 BPM

## 2020-09-23 PROCEDURE — 99213 OFFICE O/P EST LOW 20 MIN: CPT | Performed by: FAMILY MEDICINE

## 2020-09-23 PROCEDURE — U0002 COVID-19 LAB TEST NON-CDC: HCPCS

## 2020-09-23 RX ORDER — AMOXICILLIN AND CLAVULANATE POTASSIUM 875; 125 MG/1; MG/1
1 TABLET, FILM COATED ORAL 2 TIMES DAILY
Qty: 20 TABLET | Refills: 0 | Status: SHIPPED | OUTPATIENT
Start: 2020-09-23 | End: 2020-10-03

## 2020-09-23 RX ORDER — PREDNISONE 10 MG/1
10 TABLET ORAL DAILY
Qty: 10 TABLET | Refills: 0 | Status: SHIPPED | OUTPATIENT
Start: 2020-09-23 | End: 2020-10-03

## 2020-09-23 NOTE — PROGRESS NOTES
9/23/20  Denilson Mohamud  1945    FLU/COVID-19 CLINIC EVALUATION    Chief Complaint   Patient presents with    Concern For COVID-19    COPD     recurrent respiratory sx, previous covid testing tegative         HPI SYMPTOMS:    Employer: Retired    [x] Fevers -- 100.5 currently  [] Chills  [] Cough  [] Coughing up blood  [x] Chest Congestion  [x] Nasal Congestion  [x] Feeling short of breath -- WHEN COUGHING  [x] Sometimes  [] Frequently  [] All the time  [] Chest pain  [x] Headaches  []Tolerable  [x] Severe  [] Sore throat  [] Muscle aches  [] Nausea  [] Vomiting  []Unable to keep fluids down  [] Diarrhea  []Severe    [] OTHER SYMPTOMS:      Symptom Duration:   [] 1  [] 2   [] 3   [] 4    [x] 5   [] 6   [] 7   [] 8   [] 9   [] 10   [] 11   [] 12   [] 13   [] 14   [] Longer than 14 days    Symptom course:   [x] Worsening     [] Stable     [] Improving    RISK FACTORS:    [] Pregnant or possibly pregnant  [x] Age over [address] [facility]  [] Diabetes  [] Heart disease  [x] Asthma--(AS A CHILD)  [x] COPD/Other chronic lung diseases  [] Active Cancer  [] On Chemotherapy  [] Taking oral steroids  [] History Lymphoma/Leukemia  [] Close contact with a lab confirmed COVID-19 patient within 14 days of symptom onset  [] History of travel from affected geographical areas within 14 days of symptom onset       VITALS:  Vitals:    09/23/20 1204 09/23/20 1209   Pulse:  79   Temp: 100.5 °F (38.1 °C) 98.5 °F (36.9 °C)   TempSrc: Tympanic    SpO2:  99%      Physical Exam  Vitals signs and nursing note reviewed. Constitutional:       General: She is not in acute distress. Appearance: She is well-developed. She is not diaphoretic. HENT:      Head: Normocephalic. Eyes:      General:         Right eye: No discharge. Left eye: No discharge. Conjunctiva/sclera: Conjunctivae normal.      Pupils: Pupils are equal, round, and reactive to light. Neck:      Musculoskeletal: Normal range of motion.    Cardiovascular:      Rate and Rhythm: Normal rate and regular rhythm. Heart sounds: Normal heart sounds. No murmur. Pulmonary:      Effort: Pulmonary effort is normal. No respiratory distress. Breath sounds: Normal breath sounds. No wheezing or rales. Skin:     General: Skin is warm and dry. Neurological:      Mental Status: She is alert and oriented to person, place, and time. Psychiatric:         Behavior: Behavior normal.         TESTS:    POCT FLU:  [] Positive     []Negative    ASSESSMENT:    [] Flu  [x] Possible COVID-19 LIKELY COPD EXACERBATION  [] Strep    PLAN:    [x] Discharge home with written instructions for:  [] Flu management  [x] Possible COVID-19 infection with self-quarantine and management of symptoms  [x] Follow-up with primary care physician or emergency department if worsens  [] Evaluation per physician/nurse practitioner in clinic  [] Sent to ER     ATB and prednisone sent to treat current sx, call if not better. An  electronic signature was used to authenticate this note.      --Rene Fisher MD on 9/23/2020 at 12:26 PM

## 2020-09-23 NOTE — PATIENT INSTRUCTIONS
Your COVID 19 test can take 3-5 days for the results come back. We ask that you make a Mychart page and view your test results this way. You will need to Self quarantine until you know your results. Increase fluids rest  Saline nasal spray as directed  Warm salt gargles for throat discomfort  Monitor temperature twice a day  Tylenol for fevers and/or discomfort. If symptoms are worse -Go to the ER. Follow up with your primary doctor    To Whom it May Concern:    Eliezer Bonner has been tested for COVID on 09/23/20. They may NOT return to work until their lab test results back and they been fever free for 3 days. If test is positive they must stay home for 2 weeks or until they test negative or as directed by the [facility] Department.

## 2020-09-24 LAB
SARS-COV-2: NOT DETECTED
SOURCE: NORMAL

## 2020-10-02 ENCOUNTER — TELEPHONE (OUTPATIENT)
Dept: FAMILY MEDICINE CLINIC | Age: 75
End: 2020-10-02

## 2020-10-02 ENCOUNTER — HOSPITAL ENCOUNTER (OUTPATIENT)
Dept: GENERAL RADIOLOGY | Age: 75
Discharge: HOME | End: 2020-10-02
Payer: MEDICARE

## 2020-10-02 ENCOUNTER — HOSPITAL ENCOUNTER (OUTPATIENT)
Age: 75
Discharge: HOME | End: 2020-10-02
Payer: MEDICARE

## 2020-10-02 PROCEDURE — 71046 X-RAY EXAM CHEST 2 VIEWS: CPT

## 2020-10-02 NOTE — TELEPHONE ENCOUNTER
CXR Order is signed and available but no results at this time. COVID swab confirmed to be negative, suspected COPD exacerbation at the time seen with low grade fever, was treated with oral prednisone and augmentin at [address] visit. I would not want to treat with another atb (due to risk of diarrhea) unless has fever or SOB that is definitely worse or abnormal CXR result. She is a current smoker with long-standing COPD so benefit of antibiotics is marginal.      I will await her result, may need seen again next week if not making good improvement.

## 2020-10-06 ENCOUNTER — HOSPITAL ENCOUNTER (EMERGENCY)
Age: 75
Discharge: HOME | End: 2020-10-06
Attending: EMERGENCY MEDICINE
Payer: MEDICARE

## 2020-10-06 VITALS
HEART RATE: 97 BPM | SYSTOLIC BLOOD PRESSURE: 128 MMHG | DIASTOLIC BLOOD PRESSURE: 80 MMHG | WEIGHT: 93 LBS | BODY MASS INDEX: 17.01 KG/M2 | RESPIRATION RATE: 18 BRPM | OXYGEN SATURATION: 92 % | TEMPERATURE: 98 F

## 2020-10-06 PROCEDURE — 99282 EMERGENCY DEPT VISIT SF MDM: CPT

## 2020-10-06 RX ORDER — GUAIFENESIN 600 MG/1
1200 TABLET, EXTENDED RELEASE ORAL 2 TIMES DAILY
Qty: 40 TABLET | Refills: 0 | Status: SHIPPED | OUTPATIENT
Start: 2020-10-06 | End: 2020-10-29 | Stop reason: SDUPTHER

## 2020-10-06 RX ORDER — PREDNISONE 10 MG/1
TABLET ORAL
Qty: 30 TABLET | Refills: 0 | Status: SHIPPED | OUTPATIENT
Start: 2020-10-06 | End: 2020-10-29 | Stop reason: SDUPTHER

## 2020-10-06 RX ORDER — DOXYCYCLINE HYCLATE 100 MG
100 TABLET ORAL 2 TIMES DAILY
Qty: 20 TABLET | Refills: 0 | Status: SHIPPED | OUTPATIENT
Start: 2020-10-06 | End: 2020-10-29 | Stop reason: SDUPTHER

## 2020-10-06 NOTE — ED TRIAGE NOTES
Patient arrived to Ed via walk in for viral symptoms for 3 weeks. Patient tested for covid and negative recently. Patient has recently finished prescriptions for prednisone and amox.

## 2020-10-06 NOTE — ED PROVIDER NOTES
eMERGENCY dEPARTMENT eNCOUnter        [address] [facility]    Chief Complaint   Patient presents with    Cough     3 weeks    Sinusitis       HPI    Tra Davies is a 67 y.o. female who presents with sinus congestion, cough for the last 3 weeks. States initially started as a lot of congestion, sinus pressure, then she felt like it went into her throat and upper chest and has stayed there. [facility] had taken amoxicillin for short course as well as 5 days of prednisone. States that she did not feel that it helped. Reports that she had a chest x-ray done a few days ago but that was never told the result. She denies fever, chills. No leg pain or swelling. No vomiting, diarrhea. States she was around granddaughter and children who have had URIs. REVIEW OF SYSTEMS    Constitutional:  Denies fever, chills  HENT:  See above  Eyes: No vision change, discharge  Cardiovascular:  Denies chest pain, palpitations.   Respiratory:  Denies shortness of breath, + cough  GI:  Denies abdominal pain, vomiting, or diarrhea   :  Denies any urinary symptoms  Extremity: Denies edema, joint pain  Neurologic:  Denies headache, focal weakness  Skin: Denies rash, color change       All other review of systems are negative  See HPI and nursing notes for additional information       PAST MEDICAL AND SURGICAL HISTORY    Past Medical History:   Diagnosis Date    Former smoker 1/5/2018    Hypertension     Moderate COPD (chronic obstructive pulmonary disease) ([address] 1/5/2018    Other emphysema ([facility] [address] 1/5/2018     Past Surgical History:   Procedure Laterality Date    ABDOMEN SURGERY      CHOLECYSTECTOMY      HYSTERECTOMY      OTHER SURGICAL HISTORY  07/16/2018    exp lap with TUNG       CURRENT MEDICATIONS    Current Outpatient Rx   Medication Sig Dispense Refill    montelukast (SINGULAIR) 10 MG tablet TAKE 1 TABLET NIGHTLY 90 tablet 3    clonazePAM (KLONOPIN) 1 MG tablet TAKE 1 TABLET TWICE DAILY AS NEEDED FOR ANXIETY 60 tablet 2    traZODone (DESYREL) 50 MG tablet TAKE 1 TABLET NIGHTLY 90 tablet 3    loratadine (EQ ALLERGY RELIEF) 10 MG tablet Take 10 mg by mouth daily      sodium chloride (OCEAN, BABY AYR) 0.65 % nasal spray 1 spray by Nasal route as needed for Congestion      albuterol sulfate  (90 Base) MCG/ACT inhaler USE 2 INHALATIONS ORALLY INTO THE LUNGS EVERY 6 HOURS AS NEEDED FOR WHEEZING OR SHORTNESS OF BREATH 54 g 3    melatonin 5 MG TABS tablet Take 5 mg by mouth nightly      aspirin 81 MG tablet Take 81 mg by mouth daily. ALLERGIES    Allergies   Allergen Reactions    Pravastatin Sodium Other (See Comments)     Myalgias (note from 5/15/18)       FAMILY AND SOCIAL HISTORY    Family History   Problem Relation Age of Onset    Heart Disease Mother     Diabetes Mother     Cancer Father     Diabetes Brother      Social History     Socioeconomic History    Marital status:      Spouse name: None    Number of children: None    Years of education: None    Highest education level: None   Occupational History    None   Social Needs    Financial resource strain: None    Food insecurity     Worry: None     Inability: None    Transportation needs     Medical: None     Non-medical: None   Tobacco Use    Smoking status: Current Every Day Smoker     Packs/day: 0.50     Years: 50.00     Pack years: 25.00     Types: Cigarettes     Last attempt to quit: 2018     Years since quittin.8    Smokeless tobacco: Never Used    Tobacco comment: Off and on smoker pt states.    Substance and Sexual Activity    Alcohol use: No    Drug use: No    Sexual activity: None   Lifestyle    Physical activity     Days per week: None     Minutes per session: None    Stress: None   Relationships    Social connections     Talks on phone: None     Gets together: None     Attends Cheondoism service: None     Active member of club or organization: None     Attends meetings of clubs or MEDICAL DECISION MAKING       Vital signs and nursing notes reviewed during ED course. I have independently evaluated this patient . All pertinent Lab data and radiographic results reviewed with patient at bedside. The patient and/or the family were informed of the results of any tests/labs/imaging, the treatment plan, and time was allotted to answer questions. Presenting with sinus congestion, cough. She does smoke, has history of COPD. She had chest x-ray done a few days ago, I can see the results which looked nonacute. She is well-appearing and nontoxic, appears in no distress. Given the failed treatment and length of symptoms will treat with doxycycline, tapered prednisone due to COPD and mucinex for thinning secretions. She is instructed to follow up with PCP for reevaluation or return to the ED with any new or worsening signs or symptoms. Clinical  IMPRESSION    Sinusitis, history of COPD      Diagnosis and plan discussed in detail with patient who understands and agrees. Patient agrees to return emergency department if symptoms worsen or any new symptoms develop.       (Please note the MDM and HPI sections of this note were completed with a voice recognition program.  Efforts were made to edit the dictations but occasionally words are mis-transcribed.)      Michel Dasilva DO  10/06/20 [id number]

## 2020-10-07 ENCOUNTER — CARE COORDINATION (OUTPATIENT)
Dept: CARE COORDINATION | Age: 75
End: 2020-10-07

## 2020-10-13 RX ORDER — ALBUTEROL SULFATE 90 UG/1
AEROSOL, METERED RESPIRATORY (INHALATION)
Qty: 54 G | Refills: 3 | Status: SHIPPED | OUTPATIENT
Start: 2020-10-13 | End: 2021-06-16

## 2020-10-15 ENCOUNTER — CARE COORDINATION (OUTPATIENT)
Dept: CARE COORDINATION | Age: 75
End: 2020-10-15

## 2020-10-29 ENCOUNTER — OFFICE VISIT (OUTPATIENT)
Dept: FAMILY MEDICINE CLINIC | Age: 75
End: 2020-10-29
Payer: MEDICARE

## 2020-10-29 VITALS
HEART RATE: 91 BPM | DIASTOLIC BLOOD PRESSURE: 80 MMHG | BODY MASS INDEX: 17.27 KG/M2 | WEIGHT: 94.4 LBS | SYSTOLIC BLOOD PRESSURE: 122 MMHG | OXYGEN SATURATION: 94 %

## 2020-10-29 VITALS
DIASTOLIC BLOOD PRESSURE: 80 MMHG | HEART RATE: 91 BPM | BODY MASS INDEX: 17.37 KG/M2 | WEIGHT: 94.4 LBS | OXYGEN SATURATION: 92 % | SYSTOLIC BLOOD PRESSURE: 122 MMHG | HEIGHT: 62 IN

## 2020-10-29 PROCEDURE — G0439 PPPS, SUBSEQ VISIT: HCPCS | Performed by: FAMILY MEDICINE

## 2020-10-29 PROCEDURE — 99213 OFFICE O/P EST LOW 20 MIN: CPT | Performed by: FAMILY MEDICINE

## 2020-10-29 RX ORDER — BUDESONIDE AND FORMOTEROL FUMARATE DIHYDRATE 160; 4.5 UG/1; UG/1
2 AEROSOL RESPIRATORY (INHALATION) 2 TIMES DAILY
Qty: 3 INHALER | Refills: 1 | Status: SHIPPED | OUTPATIENT
Start: 2020-10-29 | End: 2021-02-23 | Stop reason: SDUPTHER

## 2020-10-29 RX ORDER — GUAIFENESIN 600 MG/1
1200 TABLET, EXTENDED RELEASE ORAL 2 TIMES DAILY
Qty: 40 TABLET | Refills: 0 | Status: SHIPPED | OUTPATIENT
Start: 2020-10-29 | End: 2020-11-08

## 2020-10-29 RX ORDER — PREDNISONE 10 MG/1
TABLET ORAL
Qty: 30 TABLET | Refills: 0 | Status: SHIPPED | OUTPATIENT
Start: 2020-10-29 | End: 2020-11-08

## 2020-10-29 RX ORDER — DOXYCYCLINE HYCLATE 100 MG
100 TABLET ORAL 2 TIMES DAILY
Qty: 20 TABLET | Refills: 0 | Status: SHIPPED | OUTPATIENT
Start: 2020-10-29 | End: 2020-11-08

## 2020-10-29 ASSESSMENT — PATIENT HEALTH QUESTIONNAIRE - PHQ9
SUM OF ALL RESPONSES TO PHQ QUESTIONS 1-9: 0
1. LITTLE INTEREST OR PLEASURE IN DOING THINGS: 0
2. FEELING DOWN, DEPRESSED OR HOPELESS: 0
SUM OF ALL RESPONSES TO PHQ QUESTIONS 1-9: 0
SUM OF ALL RESPONSES TO PHQ QUESTIONS 1-9: 0
SUM OF ALL RESPONSES TO PHQ9 QUESTIONS 1 & 2: 0

## 2020-10-29 ASSESSMENT — ENCOUNTER SYMPTOMS
RHINORRHEA: 0
CONSTIPATION: 0
HEMOPTYSIS: 0
SHORTNESS OF BREATH: 1
ABDOMINAL PAIN: 0
COUGH: 1
SORE THROAT: 0
DIARRHEA: 0
HEARTBURN: 0
WHEEZING: 1

## 2020-10-29 ASSESSMENT — LIFESTYLE VARIABLES: HOW OFTEN DO YOU HAVE A DRINK CONTAINING ALCOHOL: 0

## 2020-10-29 NOTE — PROGRESS NOTES
Amina Salas  1945  10/29/20    Chief Complaint   Patient presents with    Cough     Patient states having cough, and some SOB. Patient here c/o cough, with SOB, already evaluated by the ED and was given doxy and prednisone, finished one wk, did help but still has some, has CXR was noraml. Cough   This is a new problem. The current episode started 1 to 4 weeks ago. The problem has been waxing and waning. The problem occurs constantly. The cough is productive of sputum. Associated symptoms include shortness of breath and wheezing. Pertinent negatives include no chest pain, chills, ear congestion, fever, headaches, heartburn, hemoptysis, myalgias, nasal congestion, postnasal drip, rhinorrhea, sore throat, sweats or weight loss. Nothing aggravates the symptoms. She has tried prescription cough suppressant and oral steroids (went to the ED, antibiotic and pred and mucinex were given) for the symptoms. The treatment provided moderate relief. Her past medical history is significant for COPD and emphysema.        Past Medical History:   Diagnosis Date    Former smoker 1/5/2018    Hypertension     Moderate COPD (chronic obstructive pulmonary disease) ([address] 1/5/2018    Other emphysema ([address] 1/5/2018     Past Surgical History:   Procedure Laterality Date    ABDOMEN SURGERY      CHOLECYSTECTOMY      HYSTERECTOMY      OTHER SURGICAL HISTORY  07/16/2018    exp lap with TUNG     Family History   Problem Relation Age of Onset    Heart Disease Mother     Diabetes Mother     Cancer Father     Diabetes Brother      Social History     Socioeconomic History    Marital status:      Spouse name: Not on file    Number of children: Not on file    Years of education: Not on file    Highest education level: Not on file   Occupational History    Not on file   Social Needs    Financial resource strain: Not on file    Food insecurity     Worry: Not on file     Inability: Not on file   Anne Estrada Transportation needs     Medical: Not on file     Non-medical: Not on file   Tobacco Use    Smoking status: Current Every Day Smoker     Packs/day: 0.50     Years: 50.00     Pack years: 25.00     Types: Cigarettes     Last attempt to quit: 2018     Years since quittin.9    Smokeless tobacco: Never Used    Tobacco comment: Off and on smoker pt states. Substance and Sexual Activity    Alcohol use: No    Drug use: No    Sexual activity: Not on file   Lifestyle    Physical activity     Days per week: Not on file     Minutes per session: Not on file    Stress: Not on file   Relationships    Social connections     Talks on phone: Not on file     Gets together: Not on file     Attends Buddhism service: Not on file     Active member of club or organization: Not on file     Attends meetings of clubs or organizations: Not on file     Relationship status: Not on file    Intimate partner violence     Fear of current or ex partner: Not on file     Emotionally abused: Not on file     Physically abused: Not on file     Forced sexual activity: Not on file   Other Topics Concern    Not on file   Social History Narrative    Not on file       Allergies   Allergen Reactions    Pravastatin Sodium Other (See Comments)     Myalgias (note from 5/15/18)     Current Outpatient Medications   Medication Sig Dispense Refill    budesonide-formoterol (SYMBICORT) 160-4.5 MCG/ACT AERO Inhale 2 puffs into the lungs 2 times daily 3 Inhaler 1    predniSONE (DELTASONE) 10 MG tablet Take 4 tablets by mouth daily for 4 days, THEN 3 tablets daily for 3 days, THEN 2 tablets daily for 2 days, THEN 1 tablet daily for 1 day.  30 tablet 0    doxycycline hyclate (VIBRA-TABS) 100 MG tablet Take 1 tablet by mouth 2 times daily for 10 days 20 tablet 0    guaiFENesin (MUCINEX) 600 MG extended release tablet Take 2 tablets by mouth 2 times daily for 10 days 40 tablet 0    albuterol sulfate  (90 Base) MCG/ACT inhaler USE 2 INHALATIONS ORALLY INTO THE LUNGS EVERY 6 HOURS AS NEEDED FOR WHEEZING OR SHORTNESS OF BREATH 54 g 3    montelukast (SINGULAIR) 10 MG tablet TAKE 1 TABLET NIGHTLY 90 tablet 3    traZODone (DESYREL) 50 MG tablet TAKE 1 TABLET NIGHTLY 90 tablet 3    sodium chloride (OCEAN, BABY AYR) 0.65 % nasal spray 1 spray by Nasal route as needed for Congestion      melatonin 5 MG TABS tablet Take 5 mg by mouth nightly      aspirin 81 MG tablet Take 81 mg by mouth daily.  clonazePAM (KLONOPIN) 1 MG tablet TAKE 1 TABLET TWICE DAILY  AS NEEDED FOR ANXIETY 60 tablet 2     No current facility-administered medications for this visit. Review of Systems   Constitutional: Negative for activity change, appetite change, chills, fatigue, fever and weight loss. HENT: Negative for congestion, postnasal drip, rhinorrhea and sore throat. Respiratory: Positive for cough, shortness of breath and wheezing. Negative for hemoptysis. Cardiovascular: Negative for chest pain and leg swelling. Gastrointestinal: Negative for abdominal pain, constipation, diarrhea and heartburn. Musculoskeletal: Negative for myalgias. Neurological: Negative for dizziness and headaches. Psychiatric/Behavioral: Negative for agitation. The patient is not nervous/anxious.         Lab Results   Component Value Date    WBC 4.5 03/22/2020    HGB 11.2 (L) 03/22/2020    HCT 35.8 (L) 03/22/2020    MCV 94.0 03/22/2020     03/22/2020     Lab Results   Component Value Date     03/22/2020    K 3.9 03/22/2020     03/22/2020    CO2 34 (H) 03/22/2020    BUN 15 03/22/2020    CREATININE 0.7 03/22/2020    GLUCOSE 103 (H) 03/22/2020    CALCIUM 8.5 03/22/2020    PROT 4.7 (L) 03/22/2020    LABALBU 3.1 (L) 03/22/2020    BILITOT 0.2 03/22/2020    ALKPHOS 67 03/22/2020    AST 17 03/22/2020    ALT 17 03/22/2020    LABGLOM >60 03/22/2020    GFRAA >60 03/22/2020    AGRATIO 3.2 (H) 10/24/2019    GLOB 1.5 10/24/2019     Lab Results   Component Value Date    CHOL 164 05/16/2018    CHOL 167 07/10/2011    CHOL 188 05/18/2011     Lab Results   Component Value Date    TRIG 93 05/16/2018    TRIG 74 07/10/2011    TRIG 131 05/18/2011     Lab Results   Component Value Date    HDL 62 05/16/2018    HDL 47 (L) 07/10/2011    HDL 57 (L) 05/18/2011     Lab Results   Component Value Date    LDLCALC 83 05/16/2018     No results found for: LABA1C  Lab Results   Component Value Date    TSH 1.28 10/24/2019    TSHHS 1.330 07/10/2011         /80 (Site: Left Upper Arm, Position: Sitting, Cuff Size: Medium Adult)   Pulse 91   Wt 94 lb 6.4 oz (42.8 kg)   SpO2 94%   BMI 17.27 kg/m²     BP Readings from Last 3 Encounters:   10/29/20 122/80   10/29/20 122/80   10/06/20 128/80       Wt Readings from Last 3 Encounters:   10/29/20 94 lb 6.4 oz (42.8 kg)   10/29/20 94 lb 6.4 oz (42.8 kg)   10/06/20 93 lb (42.2 kg)         Physical Exam  Constitutional:       General: She is not in acute distress. Appearance: Normal appearance. She is not ill-appearing or diaphoretic. HENT:      Head: Normocephalic and atraumatic. Nose: No congestion. Eyes:      General: No scleral icterus. Pupils: Pupils are equal, round, and reactive to light. Neck:      Musculoskeletal: Normal range of motion and neck supple. No neck rigidity. Cardiovascular:      Rate and Rhythm: Normal rate and regular rhythm. Pulmonary:      Effort: Pulmonary effort is normal.      Breath sounds: Wheezing and rhonchi present. Musculoskeletal:      Right lower leg: No edema. Left lower leg: No edema. Neurological:      Mental Status: She is alert and oriented to person, place, and time. Psychiatric:         Mood and Affect: Mood normal.         Behavior: Behavior normal.         ASSESSMENT/ PLAN:    1. Acute bronchitis, unspecified organism  - will repeat the antibiotic course:  - predniSONE (DELTASONE) 10 MG tablet;  Take 4 tablets by mouth daily for 4 days, THEN 3 tablets daily for 3 days, THEN 2 tablets daily for 2 days, THEN 1 tablet daily for 1 day. Dispense: 30 tablet; Refill: 0  - doxycycline hyclate (VIBRA-TABS) 100 MG tablet; Take 1 tablet by mouth 2 times daily for 10 days  Dispense: 20 tablet; Refill: 0  - guaiFENesin (MUCINEX) 600 MG extended release tablet; Take 2 tablets by mouth 2 times daily for 10 days  Dispense: 40 tablet; Refill: 0    2. Chronic obstructive pulmonary disease, unspecified COPD type ([facility]  - start:  - budesonide-formoterol (SYMBICORT) 160-4.5 MCG/ACT AERO; Inhale 2 puffs into the lungs 2 times daily  Dispense: 3 Inhaler; Refill: 1              - All old blood work reviewed with the patient  - Appropriate prescription are addressed. - After visit summery provided. - Questions answered and patient verbalizes understanding.  - Call for any problem, questions, or concerns. Return in 3 months (on 1/29/2021), or if symptoms worsen or fail to improve.

## 2020-10-29 NOTE — PROGRESS NOTES
Medicare Annual Wellness Visit  Name: Sandra Pruitt Date: 10/29/2020   MRN: [id number] Sex: Female   Age: 67 y.o. Ethnicity: Non-/Non    : 1945 Race: Elana Watt is here for Medicare AWV    Screenings for behavioral, psychosocial and functional/safety risks, and cognitive dysfunction are all negative except as indicated below. These results, as well as other patient data from the [address] form, are documented in Flowsheets linked to this Encounter. Allergies   Allergen Reactions    Pravastatin Sodium Other (See Comments)     Myalgias (note from 5/15/18)         Prior to Visit Medications    Medication Sig Taking? Authorizing Provider   albuterol sulfate  (90 Base) MCG/ACT inhaler USE 2 INHALATIONS ORALLY INTO THE LUNGS EVERY 6 HOURS AS NEEDED FOR WHEEZING OR SHORTNESS OF BREATH Yes Xander Holland MD   montelukast (SINGULAIR) 10 MG tablet TAKE 1 TABLET NIGHTLY Yes Xander Holland MD   traZODone (DESYREL) 50 MG tablet TAKE 1 TABLET NIGHTLY Yes Xander Holland MD   sodium chloride (OCEAN, BABY AYR) 0.65 % nasal spray 1 spray by Nasal route as needed for Congestion Yes Historical Provider, MD   melatonin 5 MG TABS tablet Take 5 mg by mouth nightly Yes Historical Provider, MD   aspirin 81 MG tablet Take 81 mg by mouth daily. Yes Historical Provider, MD   budesonide-formoterol (SYMBICORT) 160-4.5 MCG/ACT AERO Inhale 2 puffs into the lungs 2 times daily  Xander Holland MD   predniSONE (DELTASONE) 10 MG tablet Take 4 tablets by mouth daily for 4 days, THEN 3 tablets daily for 3 days, THEN 2 tablets daily for 2 days, THEN 1 tablet daily for 1 day.   Xander Holland MD   doxycycline hyclate (VIBRA-TABS) 100 MG tablet Take 1 tablet by mouth 2 times daily for 10 days  Xander Holland MD   guaiFENesin (MUCINEX) 600 MG extended release tablet Take 2 tablets by mouth 2 times daily for 10 days  Xander Holland MD   clonazePAM (KLONOPIN) 1 MG tablet TAKE 1 TABLET Yuliana Duke MD         Past Medical History:   Diagnosis Date    Former smoker 1/5/2018    Hypertension     Moderate COPD (chronic obstructive pulmonary disease) ([facility] [address] 1/5/2018    Other emphysema ([facility] [address] 1/5/2018       Past Surgical History:   Procedure Laterality Date    ABDOMEN SURGERY      CHOLECYSTECTOMY      HYSTERECTOMY      OTHER SURGICAL HISTORY  07/16/2018    exp lap with TUNG         Family History   Problem Relation Age of Onset    Heart Disease Mother     Diabetes Mother     Cancer Father     Diabetes Brother        CareTeam (Including outside providers/suppliers regularly involved in providing care):   Patient Care Team:  Nikky Smart MD as PCP - General (Family Medicine)  Nikky Smart MD as PCP - [facility] Empaneled Provider  Swetha Lou RN as Care Transitions Nurse    Wt Readings from Last 3 Encounters:   10/29/20 94 lb 6.4 oz (42.8 kg)   10/29/20 94 lb 6.4 oz (42.8 kg)   10/06/20 93 lb (42.2 kg)     Vitals:    10/29/20 1353   BP: 122/80   Site: Left Upper Arm   Position: Sitting   Cuff Size: Medium Adult   Pulse: 91   SpO2: 92%   Weight: 94 lb 6.4 oz (42.8 kg)   Height: 5' 2\" (1.575 m)       Wt Readings from Last 3 Encounters:   10/29/20 94 lb 6.4 oz (42.8 kg)   10/29/20 94 lb 6.4 oz (42.8 kg)   10/06/20 93 lb (42.2 kg)       Body mass index is 17.27 kg/m². Based upon direct observation of the patient, evaluation of cognition reveals recent and remote memory intact. Patient's complete Health Risk Assessment and screening values have been reviewed and are found in Flowsheets. The following problems were reviewed today and where indicated follow up appointments were made and/or referrals ordered.     Positive Risk Factor Screenings with Interventions:     Substance History:  Social History     Tobacco History     Smoking Status  Current Every Day Smoker Last attempt to quit  11/19/2018 Smoking Frequency  0.5 packs/day for 50 years (25 pk yrs) Smoking Tobacco Type  Cigarettes    Smokeless Tobacco Use  Never Used    Tobacco Comment  Off and on smoker pt states. Alcohol History     Alcohol Use Status  No          Drug Use     Drug Use Status  No          Sexual Activity     Sexually Active  Not Asked               Alcohol Screening:       A score of 8 or more is associated with harmful or hazardous drinking. A score of 13 or more in women, and 15 or more in men, is likely to indicate alcohol dependence. Substance Abuse Interventions:  · doing fine    General Health and ACP:  General  In general, how would you say your health is?: Good  In the past 7 days, have you experienced any of the following?  New or Increased Pain, New or Increased Fatigue, Loneliness, Social Isolation, Stress or Anger?: None of These  Do you get the social and emotional support that you need?: Yes  Do you have a Living Will?: (!) No  Advance Directives     Power of  Living Will ACP-Advance Directive ACP-Power of     Not on File Not on File [address] Interventions:  · need to work on that    Health Habits/Nutrition:  Health Habits/Nutrition  Do you exercise for at least 20 minutes 2-3 times per week?: (!) No  Have you lost any weight without trying in the past 3 months?: No  Do you eat fewer than 2 meals per day?: No  Have you seen a dentist within the past year?: (!) No  Body mass index: (!) 17.26  Health Habits/Nutrition Interventions:  · has upper and lower denture    Personalized Preventive Plan   Current Health Maintenance Status  Immunization History   Administered Date(s) Administered    Influenza Virus Vaccine 09/25/2012, 09/16/2013, 10/09/2014, 10/13/2014    Influenza, High Dose (Fluzone 65 yrs and older) 09/22/2016, 10/03/2017, 10/17/2018    Influenza, Triv, inactivated, subunit, adjuvanted, IM (Fluad 65 yrs and older) 10/24/2019    Pneumococcal Conjugate 13-valent ([id number]) 10/13/2017    Pneumococcal Polysaccharide ([id number]) 01/21/2019    Tdap (Boostrix, Adacel) 08/07/2017        Health Maintenance   Topic Date Due    Shingles Vaccine (1 of 2) 04/09/1994    DEXA (modify frequency per FRAX score)  04/09/1999    Annual Wellness Visit (AWV)  05/29/2019    Flu vaccine (1) 09/01/2020    Potassium monitoring  03/22/2021    Creatinine monitoring  03/22/2021    DTaP/Tdap/Td vaccine (2 - Td) 08/07/2027    Pneumococcal 65+ years Vaccine  Completed    Hepatitis A vaccine  Aged Out    Hepatitis B vaccine  Aged Out    Hib vaccine  Aged Out    Meningococcal (ACWY) vaccine  Aged Out     Recommendations for [employer] Due: see orders and patient instructions/AVS.  . Recommended screening schedule for the next 5-10 years is provided to the patient in written form: see Patient Instructions/AVS.    Lindsay Holland was seen today for medicare awv.     Diagnoses and all orders for this visit:    Routine general medical examination at a health care facility

## 2020-10-29 NOTE — PATIENT INSTRUCTIONS
Personalized Preventive Plan for Juventino Sánchez - 10/29/2020  Medicare offers a range of preventive health benefits. Some of the tests and screenings are paid in full while other may be subject to a deductible, co-insurance, and/or copay. Some of these benefits include a comprehensive review of your medical history including lifestyle, illnesses that may run in your family, and various assessments and screenings as appropriate. After reviewing your medical record and screening and assessments performed today your provider may have ordered immunizations, labs, imaging, and/or referrals for you. A list of these orders (if applicable) as well as your Preventive Care list are included within your After Visit Summary for your review. Other Preventive Recommendations:    · A preventive eye exam performed by an eye specialist is recommended every 1-2 years to screen for glaucoma; cataracts, macular degeneration, and other eye disorders. · A preventive dental visit is recommended every 6 months. · Try to get at least 150 minutes of exercise per week or 10,000 steps per day on a pedometer . · Order or download the FREE \"Exercise & Physical Activity: Your Everyday Guide\" from [employer]. Call 6-547.393.4392 or search [employer] online. · You need 0112-8400 mg of calcium and 7541-7355 IU of vitamin D per day. It is possible to meet your calcium requirement with diet alone, but a vitamin D supplement is usually necessary to meet this goal.  · When exposed to the sun, use a sunscreen that protects against both UVA and UVB radiation with an SPF of 30 or greater. Reapply every 2 to 3 hours or after sweating, drying off with a towel, or swimming. · Always wear a seat belt when traveling in a car. Always wear a helmet when riding a bicycle or motorcycle.

## 2020-11-06 RX ORDER — CLONAZEPAM 1 MG/1
TABLET ORAL
Qty: 60 TABLET | Refills: 2 | Status: SHIPPED | OUTPATIENT
Start: 2020-11-06 | End: 2021-02-23 | Stop reason: SDUPTHER

## 2020-11-16 ENCOUNTER — APPOINTMENT (OUTPATIENT)
Dept: GENERAL RADIOLOGY | Age: 75
End: 2020-11-16
Payer: MEDICARE

## 2020-11-16 ENCOUNTER — HOSPITAL ENCOUNTER (EMERGENCY)
Age: 75
Discharge: HOME | End: 2020-11-16
Attending: EMERGENCY MEDICINE
Payer: MEDICARE

## 2020-11-16 VITALS
DIASTOLIC BLOOD PRESSURE: 79 MMHG | HEART RATE: 95 BPM | TEMPERATURE: 98.3 F | OXYGEN SATURATION: 93 % | BODY MASS INDEX: 17.19 KG/M2 | WEIGHT: 94 LBS | RESPIRATION RATE: 18 BRPM | SYSTOLIC BLOOD PRESSURE: 145 MMHG

## 2020-11-16 PROCEDURE — 71045 X-RAY EXAM CHEST 1 VIEW: CPT

## 2020-11-16 PROCEDURE — 99283 EMERGENCY DEPT VISIT LOW MDM: CPT

## 2020-11-16 RX ORDER — PREDNISONE 20 MG/1
TABLET ORAL
Qty: 15 TABLET | Refills: 0 | Status: SHIPPED | OUTPATIENT
Start: 2020-11-16 | End: 2020-11-26

## 2020-11-16 NOTE — ED PROVIDER NOTES
eMERGENCY dEPARTMENT eNCOUnter        [address] [facility]    Chief Complaint   Patient presents with    Sinusitis     2 months       HPI    Dana Brannon is a 67 y.o. female who presents with sinus congestion, rhinorrhea, cough. States that 2 months ago she was dealing with a sinus infection that took a while to get rid of. States that she was having a lot of congestion, cough. She was prescribed an antibiotic, steroid. States she felt better for about 2 weeks. States that then it came back. States some frontal and maxillary congestion, clear rhinorrhea and a cough. States the cough seems to sometimes be yellow, sometimes clear. She states she does have COPD, emphysema, uses albuterol and most recently was prescribed Symbicort which she is using twice a day. She is using some saline nasal spray. She denies fever, chills. States she does feel short of breath but does have COPD. REVIEW OF SYSTEMS    Constitutional:  Denies fever, chills  HENT:  See above  Eyes: No vision change, discharge  Cardiovascular:  Denies chest pain, palpitations.   Respiratory:  Denies shortness of breath, + cough  GI:  Denies abdominal pain, vomiting, or diarrhea   :  Denies any urinary symptoms  Extremity: Denies edema, joint pain  Neurologic:  Denies headache, focal weakness  Skin: Denies rash, color change       All other review of systems are negative  See HPI and nursing notes for additional information       PAST MEDICAL AND SURGICAL HISTORY    Past Medical History:   Diagnosis Date    Former smoker 1/5/2018    Hypertension     Moderate COPD (chronic obstructive pulmonary disease) ([facility] [address] 1/5/2018    Other emphysema ([facility] [address] 1/5/2018     Past Surgical History:   Procedure Laterality Date    ABDOMEN SURGERY      CHOLECYSTECTOMY      HYSTERECTOMY      OTHER SURGICAL HISTORY  07/16/2018    exp lap with TUNG       CURRENT MEDICATIONS    Current Outpatient Rx   Medication Sig Dispense Refill    clonazePAM (KLONOPIN) 1 MG tablet TAKE 1 TABLET TWICE DAILY  AS NEEDED FOR ANXIETY 60 tablet 2    budesonide-formoterol (SYMBICORT) 160-4.5 MCG/ACT AERO Inhale 2 puffs into the lungs 2 times daily 3 Inhaler 1    albuterol sulfate  (90 Base) MCG/ACT inhaler USE 2 INHALATIONS ORALLY INTO THE LUNGS EVERY 6 HOURS AS NEEDED FOR WHEEZING OR SHORTNESS OF BREATH 54 g 3    montelukast (SINGULAIR) 10 MG tablet TAKE 1 TABLET NIGHTLY 90 tablet 3    traZODone (DESYREL) 50 MG tablet TAKE 1 TABLET NIGHTLY 90 tablet 3    sodium chloride (OCEAN, BABY AYR) 0.65 % nasal spray 1 spray by Nasal route as needed for Congestion      melatonin 5 MG TABS tablet Take 5 mg by mouth nightly      aspirin 81 MG tablet Take 81 mg by mouth daily. ALLERGIES    Allergies   Allergen Reactions    Pravastatin Sodium Other (See Comments)     Myalgias (note from 5/15/18)       FAMILY AND SOCIAL HISTORY    Family History   Problem Relation Age of Onset    Heart Disease Mother     Diabetes Mother     Cancer Father     Diabetes Brother      Social History     Socioeconomic History    Marital status:      Spouse name: Not on file    Number of children: Not on file    Years of education: Not on file    Highest education level: Not on file   Occupational History    Not on file   Social Needs    Financial resource strain: Not on file    Food insecurity     Worry: Not on file     Inability: Not on file    Transportation needs     Medical: Not on file     Non-medical: Not on file   Tobacco Use    Smoking status: Current Every Day Smoker     Packs/day: 0.50     Years: 50.00     Pack years: 25.00     Types: Cigarettes     Last attempt to quit: 2018     Years since quittin.9    Smokeless tobacco: Never Used    Tobacco comment: Off and on smoker pt states.    Substance and Sexual Activity    Alcohol use: No    Drug use: No    Sexual activity: Not on file   Lifestyle    Physical activity     Days per week: Not on file     Minutes per session: Not on file    Stress: Not on file   Relationships    Social connections     Talks on phone: Not on file     Gets together: Not on file     Attends [employer] service: Not on file     Active member of club or organization: Not on file     Attends meetings of clubs or organizations: Not on file     Relationship status: Not on file    Intimate partner violence     Fear of current or ex partner: Not on file     Emotionally abused: Not on file     Physically abused: Not on file     Forced sexual activity: Not on file   Other Topics Concern    Not on file   Social History Narrative    Not on file       PHYSICAL EXAM    VITAL SIGNS: BP (!) 145/79   Pulse 95   Temp 98.3 °F (36.8 °C) (Oral)   Resp 18   Wt 94 lb (42.6 kg)   SpO2 93%   BMI 17.19 kg/m²   Constitutional:  Awake, alert, no acute distress, non-toxic appearance   Eyes: PERRL, EOM intact. Conjunctiva normal.  HENT:  - Normocephalic, atraumatic     -  Frontal/Maxillary sinuses mildly tender to percussion.   - Nasal passages with mildly erythematous   - Oropharynx mildly erythematous without tonsillar hypertrophy or exudate. No trimus. Handling secretions without difficulty. Neck/Lymphatics:  supple, no lymphadenopathy   Respiratory:  Normal respiratory effort. Occasional wheezing noted. Cardiovascular:  Regular rate, normal rhythm. No murmurs. GI:  Soft, no abdominal tenderness, nondistended. Musculoskeletal:  Normal ROM, no edema   Integument:  Skin pink, warm, dry, no rash      RADIOLOGY/PROCEDURES    Xr Chest Portable    Result Date: 11/16/2020  EXAMINATION: ONE XRAY VIEW OF THE CHEST 11/16/2020 12:41 pm COMPARISON: 10/02/2020 HISTORY: ORDERING SYSTEM PROVIDED HISTORY: cough TECHNOLOGIST PROVIDED HISTORY: Reason for exam:->cough FINDINGS: The cardiac silhouette is stable in size. Vascular calcifications are noted along the aortic arch. There is sequela of old granulomatous disease. No focal lung infiltrate.   No pleural effusion or pneumothorax. The visualized osseous structures are unremarkable. Saline breast implants are noted bilaterally. No acute cardiopulmonary disease. ED COURSE & MEDICAL DECISION MAKING       Vital signs and nursing notes reviewed during ED course. I have independently evaluated this patient . All pertinent Lab data and radiographic results reviewed with patient at bedside. The patient and/or the family were informed of the results of any tests/labs/imaging, the treatment plan, and time was allotted to answer questions. This is a 73yo female who presented with cough, sinus congestion. She is 93% on room air, does have COPD, emphysema. She appears in no distress. She is well-appearing and nontoxic. Chest x-ray shows no acute cardiopulmonary disease. She did have improvement of her symptoms previously with prednisone, doxycycline. She does seem to have less sinus aspect on this evaluation as previous, more cough today. Will treat with prednisone given history of COPD with increase in cough. Discussed Flonase for her nasal symptoms. She does states she has this at home. Also recommend follow-up with ENT if she continues to have sinus symptoms. Clinical  IMPRESSION    URI, history of COPD      Diagnosis and plan discussed in detail with patient who understands and agrees. Patient agrees to return emergency department if symptoms worsen or any new symptoms develop.       (Please note the MDM and HPI sections of this note were completed with a voice recognition program.  Efforts were made to edit the dictations but occasionally words are mis-transcribed.)      Katya Harman, DO  11/16/20 [id number]

## 2020-11-17 ENCOUNTER — CARE COORDINATION (OUTPATIENT)
Dept: CARE COORDINATION | Age: 75
End: 2020-11-17

## 2020-11-17 ENCOUNTER — TELEPHONE (OUTPATIENT)
Dept: FAMILY MEDICINE CLINIC | Age: 75
End: 2020-11-17

## 2020-11-17 SDOH — SOCIAL STABILITY: SOCIAL NETWORK
DO YOU BELONG TO ANY CLUBS OR ORGANIZATIONS SUCH AS CHURCH GROUPS UNIONS, FRATERNAL OR ATHLETIC GROUPS, OR SCHOOL GROUPS?: NO

## 2020-11-17 SDOH — HEALTH STABILITY: MENTAL HEALTH
STRESS IS WHEN SOMEONE FEELS TENSE, NERVOUS, ANXIOUS, OR CAN'T SLEEP AT NIGHT BECAUSE THEIR MIND IS TROUBLED. HOW STRESSED ARE YOU?: ONLY A LITTLE

## 2020-11-17 SDOH — ECONOMIC STABILITY: FOOD INSECURITY: WITHIN THE PAST 12 MONTHS, THE FOOD YOU BOUGHT JUST DIDN'T LAST AND YOU DIDN'T HAVE MONEY TO GET MORE.: NEVER TRUE

## 2020-11-17 SDOH — HEALTH STABILITY: PHYSICAL HEALTH: ON AVERAGE, HOW MANY DAYS PER WEEK DO YOU ENGAGE IN MODERATE TO STRENUOUS EXERCISE (LIKE A BRISK WALK)?: 0 DAYS

## 2020-11-17 SDOH — SOCIAL STABILITY: SOCIAL NETWORK
IN A TYPICAL WEEK, HOW MANY TIMES DO YOU TALK ON THE PHONE WITH FAMILY, FRIENDS, OR NEIGHBORS?: MORE THAN THREE TIMES A WEEK

## 2020-11-17 SDOH — ECONOMIC STABILITY: TRANSPORTATION INSECURITY
IN THE PAST 12 MONTHS, HAS LACK OF TRANSPORTATION KEPT YOU FROM MEETINGS, WORK, OR FROM GETTING THINGS NEEDED FOR DAILY LIVING?: NO

## 2020-11-17 SDOH — ECONOMIC STABILITY: INCOME INSECURITY: HOW HARD IS IT FOR YOU TO PAY FOR THE VERY BASICS LIKE FOOD, HOUSING, MEDICAL CARE, AND HEATING?: NOT VERY HARD

## 2020-11-17 SDOH — SOCIAL STABILITY: SOCIAL NETWORK: HOW OFTEN DO YOU ATTENT MEETINGS OF THE CLUB OR ORGANIZATION YOU BELONG TO?: NEVER

## 2020-11-17 SDOH — HEALTH STABILITY: PHYSICAL HEALTH: ON AVERAGE, HOW MANY MINUTES DO YOU ENGAGE IN EXERCISE AT THIS LEVEL?: 0 MIN

## 2020-11-17 SDOH — SOCIAL STABILITY: SOCIAL NETWORK: HOW OFTEN DO YOU ATTEND CHURCH OR RELIGIOUS SERVICES?: NEVER

## 2020-11-17 SDOH — SOCIAL STABILITY: SOCIAL NETWORK: HOW OFTEN DO YOU GET TOGETHER WITH FRIENDS OR RELATIVES?: TWICE A WEEK

## 2020-11-17 SDOH — ECONOMIC STABILITY: TRANSPORTATION INSECURITY
IN THE PAST 12 MONTHS, HAS THE LACK OF TRANSPORTATION KEPT YOU FROM MEDICAL APPOINTMENTS OR FROM GETTING MEDICATIONS?: NO

## 2020-11-17 SDOH — SOCIAL STABILITY: SOCIAL NETWORK: ARE YOU MARRIED, WIDOWED, DIVORCED, SEPARATED, NEVER MARRIED, OR LIVING WITH A PARTNER?: DIVORCED

## 2020-11-17 SDOH — ECONOMIC STABILITY: FOOD INSECURITY: WITHIN THE PAST 12 MONTHS, YOU WORRIED THAT YOUR FOOD WOULD RUN OUT BEFORE YOU GOT MONEY TO BUY MORE.: NEVER TRUE

## 2020-11-17 ASSESSMENT — ENCOUNTER SYMPTOMS: DYSPNEA ASSOCIATED WITH: MINIMAL EXERTION

## 2020-11-17 NOTE — CARE COORDINATION
Ambulatory Care Coordination Note  11/17/2020  CM Risk Score: 2  Charlson 10 Year Mortality Risk Score: 47%     ACC: Kaye Smith, RN    Summary Note: Call to check on pt status after recent ER visit. Confirmed that pt has started steroid pack. Pt is unable to identify trigger for COPD exacerbation. She is concerned that she had a positive COVID exposure. Her granddaughter was tested 11/13 & is positive, pt had contact with her just days prior. Pt denies any current symptoms other than the SOB which could also be related to her COPD. Encouraged pt to est with a pulmonologist for her COPD, but pt declines, she prefers to stay with PCP for management. Talked to pt about possibility of nebulizer or spacer to help ensure she is getting the best benefit of her medications, pt is open to trying either as long as the insurance covers it. Plan: work with PCP to help pt develop a COPD zone action plan to help reduce ER visits. Maybe see if more frequent PCP OV would be helpful. Ambulatory Care Coordination Assessment    Care Coordination Protocol  Program Enrollment:  Complex Care  Referral from Primary Care Provider:  No  Week 1 - Initial Assessment     Do you have all of your prescriptions and are they filled?:  Yes  Barriers to medication adherence:  None  Are you able to afford your medications?:  Yes  How often do you have trouble taking your medications the way you have been told to take them?:  I always take them as prescribed. Do you have Home O2 Therapy?:  No      Ability to seek help/take action for Emergent Urgent situations i.e. fire, crime, inclement weather or health crisis. :  Independent  Ability to ambulate to restroom:  Independent  Ability handle personal hygeine needs (bathing/dressing/grooming): Independent  Ability to manage Medications: Independent  Ability to prepare Food Preparation:  Independent  Ability to maintain home (clean home, laundry):   Independent Ability to drive and/or has transportation:  Independent  Ability to do shopping:  Independent  Ability to manage finances: Independent  Is patient able to live independently?:  Yes     Current Housing:  Private Residence        Per the Fall Risk Screening, did the patient have 2 or more falls or 1 fall with injury in the past year?:  No     Frequent urination at night?:  No  Do you use rails/bars?:  No  Do you have a non-slip tub mat?:  No     Are you experiencing loss of meaning?:  No  Are you experiencing loss of hope and peace?:  No     Thinking about your patient's physical health needs, are there any symptoms or problems (risk indicators) you are unsure about that require further investigation?:  Mild vague physical symptoms or problems; but do not impact on daily life or are not of concern to patient   Are the patients physical health problems impacting on their mental well-being?:  Mild impact on mental well-being e.g. \"\"feeling fed-up\"\", \"\"reduced enjoyment\"\"   Are there any problems with your patients lifestyle behaviors (alcohol, drugs, diet, exercise) that are impacting on physical or mental well-being?:  Moderate to severe impact on well-being, preventing enjoyment of usual activities   Do you have any other concerns about your patients mental well-being?  How would you rate their severity and impact on the patient?:  No identified areas of concern   How would you rate their home environment in terms of safety and stability (including domestic violence, insecure housing, neighbor harassment)?:  Consistently safe, supportive, stable, no identified problems   How do daily activities impact on the patient's well-being? (include current or anticipated unemployment, work, caregiving, access to transportation or other):  Some general dissatisfaction but no concern   How would you rate their social network (family, work, friends)?:  Adequate participation with social networks How would you rate their financial resources (including ability to afford all required medical care)?:  Financially secure, some resource challenges   How wells does the patient now understand their health and well-being (symptoms, signs or risk factors) and what they need to do to manage their health?:  Reasonable to good understanding and already engages in managing health or is willing to undertake better management   How well do you think your patient can engage in healthcare discussions? (Barriers include language, deafness, aphasia, alcohol or drug problems, learning difficulties, concentration):  Clear and open communication, no identified barriers   Do other services need to be involved to help this patient?:  Other care/services not in place and required   Are current services involved with this patient well-coordinated? (Include coordination with other services you are now recommendation):  Required care/services missing and/or fragmented   Suggested Interventions and Community Resources   Pulmonary Rehab:  Declined   Smoking Cessation:  Declined   Zone Management Tools: In Process         Set up/Review an Education Plan, Set up/Review Goals              Prior to Admission medications    Medication Sig Start Date End Date Taking? Authorizing Provider   predniSONE (DELTASONE) 20 MG tablet Take 2 tablets by mouth daily for 4 days, THEN 1.5 tablets daily for 3 days, THEN 1 tablet daily for 2 days, THEN 0.5 tablets daily for 1 day.  11/16/20 11/26/20 Yes Constance Monet,    clonazePAM (KLONOPIN) 1 MG tablet TAKE 1 TABLET TWICE DAILY  AS NEEDED FOR ANXIETY 11/6/20 12/6/20 Yes Eliot Hinojosa MD   budesonide-formoterol (SYMBICORT) 160-4.5 MCG/ACT AERO Inhale 2 puffs into the lungs 2 times daily 10/29/20  Yes Eliot Hinojosa MD   albuterol sulfate  (90 Base) MCG/ACT inhaler USE 2 INHALATIONS ORALLY INTO THE LUNGS EVERY 6 HOURS AS NEEDED FOR WHEEZING OR SHORTNESS OF BREATH 10/13/20  Yes Eliot Hinojosa MD montelukast (SINGULAIR) 10 MG tablet TAKE 1 TABLET NIGHTLY 5/26/20  Yes Madonna Damon MD   traZODone (DESYREL) 50 MG tablet TAKE 1 TABLET NIGHTLY 4/14/20  Yes Madonna Damon MD   sodium chloride (OCEAN, BABY AYR) 0.65 % nasal spray 1 spray by Nasal route as needed for Congestion   Yes Historical Provider, MD   melatonin 5 MG TABS tablet Take 5 mg by mouth nightly   Yes Historical Provider, MD   aspirin 81 MG tablet Take 81 mg by mouth daily. Yes Historical Provider, MD       Future Appointments   Date Time Provider [facility]en Carson   2/4/2021  1:30 PM Madonna Damon MD [facility]   11/4/2021  1:30 PM Madonna Damon MD [facility]      and   COPD Assessment    Does the patient understand envrionmental exposure?:  No  Is the patient able to verbalize Rescue vs. Long Acting medications?:  Yes  Does the patient have a nebulizer?:  No  Does the patient use a space with inhaled medications?:  No     Increase in cough, Shortness of breath (worse than baseline)         Symptoms:   COPD associated wheezing: Pos      Symptom course:  worsening  Breathlessness:  minimal exertion  Increase use of rapid acting/rescue inhaled medications?:  Yes  Change in chronic cough?:  Increased  Change in sputum?:  Increased  Sputum characteristics:  Unable to Specify  Self Monitoring - SaO2:  No  Have you had a recent diagnosis of pneumonia either by PCP or at a hospital?:  No     HUBERT Worley RN  [address] [facility]  [phone] office/cell  [phone] fax  [email or web address]

## 2020-11-18 ENCOUNTER — HOSPITAL ENCOUNTER (OUTPATIENT)
Age: 75
Setting detail: SPECIMEN
Discharge: HOME | End: 2020-11-18
Payer: MEDICARE

## 2020-11-18 ENCOUNTER — OFFICE VISIT (OUTPATIENT)
Dept: PRIMARY CARE CLINIC | Age: 75
End: 2020-11-18
Payer: MEDICARE

## 2020-11-18 VITALS — HEART RATE: 84 BPM | TEMPERATURE: 96.9 F | OXYGEN SATURATION: 94 %

## 2020-11-18 PROCEDURE — U0002 COVID-19 LAB TEST NON-CDC: HCPCS

## 2020-11-18 PROCEDURE — 99213 OFFICE O/P EST LOW 20 MIN: CPT | Performed by: NURSE PRACTITIONER

## 2020-11-18 NOTE — PROGRESS NOTES
11/18/2020    HPI:  Chief complaint and history of present illness as per medical assistant/nurse documented today in the Flu/COVID-19 clinic. Patient states she has been sick for 2 months. Patient states she has been on two rounds of antibiotics. Patient states she was seen in the ER yesterday and was put on oral steroids. Patient states she has recently been around her grand daughter that tested positive for COVID. MEDICATIONS:  Prior to Visit Medications    Medication Sig Taking? Authorizing Provider   predniSONE (DELTASONE) 20 MG tablet Take 2 tablets by mouth daily for 4 days, THEN 1.5 tablets daily for 3 days, THEN 1 tablet daily for 2 days, THEN 0.5 tablets daily for 1 day. Constance Poot,    clonazePAM (KLONOPIN) 1 MG tablet TAKE 1 TABLET TWICE DAILY  AS NEEDED FOR ANXIETY  Eliot Hinojosa MD   budesonide-formoterol (SYMBICORT) 160-4.5 MCG/ACT AERO Inhale 2 puffs into the lungs 2 times daily  Eliot Hinojosa MD   albuterol sulfate  (90 Base) MCG/ACT inhaler USE 2 INHALATIONS ORALLY INTO THE LUNGS EVERY 6 HOURS AS NEEDED FOR WHEEZING OR SHORTNESS OF BREATH  Elba Phillips MD   montelukast (SINGULAIR) 10 MG tablet TAKE 1 TABLET NIGHTLY  Eliot Hinojosa MD   traZODone (DESYREL) 50 MG tablet TAKE 1 TABLET NIGHTLY  Elba Phillips MD   sodium chloride (OCEAN, BABY AYR) 0.65 % nasal spray 1 spray by Nasal route as needed for Congestion  Historical Provider, MD   melatonin 5 MG TABS tablet Take 5 mg by mouth nightly  Historical Provider, MD   aspirin 81 MG tablet Take 81 mg by mouth daily.   Historical Provider, MD       Allergies   Allergen Reactions    Pravastatin Sodium Other (See Comments)     Myalgias (note from 5/15/18)   ,   Past Medical History:   Diagnosis Date    Former smoker 1/5/2018    Hypertension     Moderate COPD (chronic obstructive pulmonary disease) ([address] 1/5/2018    Other emphysema ([facility] [address] 1/5/2018   ,   Past Surgical History:   Procedure Laterality Date    ABDOMEN SURGERY      CHOLECYSTECTOMY      HYSTERECTOMY      OTHER SURGICAL HISTORY  2018    exp lap with TUNG   ,   Social History     Tobacco Use    Smoking status: Current Every Day Smoker     Packs/day: 0.50     Years: 50.00     Pack years: 25.00     Types: Cigarettes     Last attempt to quit: 2018     Years since quittin.0    Smokeless tobacco: Never Used    Tobacco comment: Off and on smoker pt states. Substance Use Topics    Alcohol use: No    Drug use: No   ,   Family History   Problem Relation Age of Onset    Heart Disease Mother     Diabetes Mother     Cancer Father     Diabetes Brother    ,   Immunization History   Administered Date(s) Administered    Influenza Virus Vaccine 2012, 2013, 10/09/2014, 10/13/2014    Influenza, High Dose (Fluzone 65 yrs and older) 2016, 10/03/2017, 10/17/2018    Influenza, Triv, inactivated, subunit, adjuvanted, IM (Fluad 65 yrs and older) 10/24/2019    Pneumococcal Conjugate 13-valent (Dobzuol14) 10/13/2017    Pneumococcal Polysaccharide ([id number]) 2019    Tdap (Boostrix, Adacel) 2017   ,   Health Maintenance   Topic Date Due    Shingles Vaccine (1 of 2) 1994    DEXA (modify frequency per FRAX score)  1999    Flu vaccine (1) 2020    Potassium monitoring  2021    Creatinine monitoring  2021    Annual Wellness Visit (AWV)  10/30/2021    DTaP/Tdap/Td vaccine (2 - Td) 2027    Pneumococcal 65+ years Vaccine  Completed    Hepatitis A vaccine  Aged Out    Hepatitis B vaccine  Aged Out    Hib vaccine  Aged Out    Meningococcal (ACWY) vaccine  Aged Out       PHYSICAL EXAM:  Physical Exam  Constitutional:       Appearance: Normal appearance. HENT:      Head: Normocephalic. Right Ear: Tympanic membrane, ear canal and external ear normal.      Left Ear: Tympanic membrane, ear canal and external ear normal.      Nose: Nose normal.      Mouth/Throat:      Lips: Pink.       Mouth: Mucous membranes are moist.      Pharynx: Oropharynx is clear. Neck:      Musculoskeletal: Neck supple. Cardiovascular:      Rate and Rhythm: Normal rate and regular rhythm. Heart sounds: Normal heart sounds. Pulmonary:      Effort: Pulmonary effort is normal.      Breath sounds: Normal breath sounds. Skin:     General: Skin is warm and dry. Neurological:      Mental Status: She is alert and oriented to person, place, and time. Psychiatric:         Mood and Affect: Mood normal.         Behavior: Behavior normal.         ASSESSMENT/PLAN:  1. Cough  Your COVID 19 test can take 3-5 days for the results come back. We ask that you make a Mychart page and view your test results this way. You will need to Self quarantine until you know your results. Increase fluids rest  Saline nasal spray as directed  Warm salt gargles for throat discomfort  Monitor temperature twice a day  Tylenol for fevers and/or discomfort. If symptoms are worse -Go to the ER. Follow up with your primary doctor    To Whom it May Concern:    Tian Astudillo has been tested for COVID on 11/18/20. They may NOT return to work until their lab test results back and they been fever free for 3 days. If test is positive they must stay home for 2 weeks or until they test negative or as directed by the [facility]. - COVID-19 Ambulatory    2. Chest congestion    - COVID-19 Ambulatory    Advised patient to finish the oral steroids. Discussed with patient if her COVID test is negative and still not feeling well to contact her PCP. FOLLOW-UP:  Return if symptoms worsen or fail to improve.     In addition to other information, the printed after visit summary provided to the patient includes:  [x] COVID-19 Self care instructions  [x] COVID-19 General patient information

## 2020-11-18 NOTE — PROGRESS NOTES
11/18/20  Juan José Mcgill  1945    FLU/COVID-19 CLINIC EVALUATION    HPI SYMPTOMS: Granddaughter + Covid  Employer: Retired    [] Fevers  [] Chills  [x] Cough  [] Coughing up blood  [x] Chest Congestion  [x] Nasal Congestion  [x] Feeling short of breath  [x] Sometimes  [] Frequently  [] All the time  [] Chest pain  [] Headaches  []Tolerable  [] Severe  [] Sore throat  [] Muscle aches  [] Nausea  [] Vomiting  []Unable to keep fluids down  [] Diarrhea  []Severe    [] OTHER SYMPTOMS:      Symptom Duration:   [] 1  [] 2   [] 3   [] 4    [] 5   [] 6   [] 7   [] 8   [] 9   [] 10   [] 11   [] 12   [] 13   [] 14   [x] Longer than 14 days    Symptom course:   [] Worsening     [] Stable     [x] Improving    RISK FACTORS:    [] Pregnant or possibly pregnant  [x] Age over 61  [] Diabetes  [] Heart disease  [x] Asthma  [x] COPD/Other chronic lung diseases  [] Active Cancer  [] On Chemotherapy  [x] Taking oral steroids  [] History Lymphoma/Leukemia  [x] Close contact with a lab confirmed COVID-19 patient within 14 days of symptom onset  [] History of travel from affected geographical areas within 14 days of symptom onset       VITALS:  There were no vitals filed for this visit. TESTS:    POCT FLU:  [] Positive     []Negative    ASSESSMENT:    [] Flu  [] Possible COVID-19  [] Strep    PLAN:    [] Discharge home with written instructions for:  [] Flu management  [] Possible COVID-19 infection with self-quarantine and management of symptoms  [] Follow-up with primary care physician or emergency department if worsens  [] Evaluation per physician/NP/PA in clinic  [] Sent to ER       An  electronic signature was used to authenticate this note.      --Tommye Homans, LPN on 23/55/9414 at 8:22 AM

## 2020-11-18 NOTE — PATIENT INSTRUCTIONS
Your COVID 19 test can take 3-5 days for the results come back. We ask that you make a Mychart page and view your test results this way. You will need to Self quarantine until you know your results. Increase fluids rest  Saline nasal spray as directed  Warm salt gargles for throat discomfort  Monitor temperature twice a day  Tylenol for fevers and/or discomfort. If symptoms are worse -Go to the ER. Follow up with your primary doctor    To Whom it May Concern:    Jenifer Moralez has been tested for COVID on 11/18/20. They may NOT return to work until their lab test results back and they been fever free for 3 days. If test is positive they must stay home for 2 weeks or until they test negative or as directed by the [facility] Department.

## 2020-11-20 LAB
SARS-COV-2: NOT DETECTED
SOURCE: NORMAL

## 2020-12-02 ENCOUNTER — TELEPHONE (OUTPATIENT)
Dept: FAMILY MEDICINE CLINIC | Age: 75
End: 2020-12-02

## 2020-12-02 NOTE — TELEPHONE ENCOUNTER
Patient called and stated that she is still having leg cramps and has tried things over the counter but nothing is working. Patient would like to know what she can take to help with this?  Please advise

## 2020-12-03 RX ORDER — TIZANIDINE 4 MG/1
4 TABLET ORAL NIGHTLY
Qty: 30 TABLET | Refills: 1 | Status: SHIPPED | OUTPATIENT
Start: 2020-12-03 | End: 2021-01-25

## 2020-12-03 NOTE — TELEPHONE ENCOUNTER
I called the patient and discuss with her about the leg cramps and Rx for Zanaflex send to the pharmacy to try it

## 2021-01-14 ENCOUNTER — CARE COORDINATION (OUTPATIENT)
Dept: CARE COORDINATION | Age: 76
End: 2021-01-14

## 2021-01-14 NOTE — CARE COORDINATION
Call to check on pt status. Noted that pt had new medication for leg cramps. LM with ACM contact information & requested a call back. MISHEL ResendizN RN  Ambulatory Care Manager  [phone] office/cell  [phone] fax  [email or web address] 73 year old male with COPD. DM, afib (on Coumadin), AICD (2015 for V-tach EF 25%) CAD with PCI and C1L/IABP on 8/4/17 with  Dr. Myers.  Post op course remarkable for transient BRITANY on CKD and post op fluid overload requiring diuresis who presented to our CTS office for follow up visit and with c/o worsening FAITH. Found to be hypoxic.  Denies chest pain, endorses occasional palpitations at rest. Readmitted for further work up and diuresis.   Hospital Course:   Hypoxemia --> requiring 02 NC   AC therapy on Coumadin for h/o afib   8/16 TTE finding: negative for pericardial effusion  Acute on Chronic systolic HF; 8/16 Primacor 0.375 mcg initiated; Lasix 40 IV  8/16 CXR finding: There is a left-sided PPM. Patient status post sternotomy. There is no pneumothorax. IMPRESSION: The lungs are clear.  CHF consult called and appreciated --> Primacor gtt D/C’d; Captopril 6.25 mg TID initiated  8/17 Pulmonary consults called and appreciated started on bronchodilators; Recommend Non con chest CT and B/L Venous Duplex to evaluate for DVT/ PE (likely low risk)  8/17 INR 3.18 --> Coumadin on hold tonight

## 2021-01-25 RX ORDER — TIZANIDINE 4 MG/1
TABLET ORAL
Qty: 30 TABLET | Refills: 1 | Status: SHIPPED | OUTPATIENT
Start: 2021-01-25 | End: 2021-03-22

## 2021-02-10 ENCOUNTER — CARE COORDINATION (OUTPATIENT)
Dept: CARE COORDINATION | Age: 76
End: 2021-02-10

## 2021-02-10 ASSESSMENT — ENCOUNTER SYMPTOMS: DYSPNEA ASSOCIATED WITH: EXERTION

## 2021-02-10 NOTE — CARE COORDINATION
albuterol sulfate  (90 Base) MCG/ACT inhaler USE 2 INHALATIONS ORALLY INTO THE LUNGS EVERY 6 HOURS AS NEEDED FOR WHEEZING OR SHORTNESS OF BREATH 10/13/20   Angelia Azul MD   montelukast (SINGULAIR) 10 MG tablet TAKE 1 TABLET NIGHTLY 5/26/20   Angelia Azul MD   traZODone (DESYREL) 50 MG tablet TAKE 1 TABLET NIGHTLY 4/14/20   Angelia Azul MD   sodium chloride (OCEAN, BABY AYR) 0.65 % nasal spray 1 spray by Nasal route as needed for Congestion    Historical Provider, MD   melatonin 5 MG TABS tablet Take 5 mg by mouth nightly    Historical Provider, MD   aspirin 81 MG tablet Take 81 mg by mouth daily. Historical Provider, MD       Future Appointments   Date Time Provider Maria Del Carmen Carson   2/23/2021 10:45 AM MD Dave Rae   11/4/2021  1:30 PM MD Dave Rae      and   COPD Assessment    Does the patient understand envrionmental exposure?: No  Is the patient able to verbalize Rescue vs. Long Acting medications?: Yes  Does the patient have a nebulizer?: No  Does the patient use a space with inhaled medications?: No     No patient-reported symptoms         Symptoms:  None: Yes      Symptom course: stable  Breathlessness: exertion  Increase use of rapid acting/rescue inhaled medications?: No  Change in chronic cough?: No/At Baseline  Change in sputum?: No/At Baseline  Sputum characteristics: Unable to Specify  Self Monitoring - SaO2: No  Have you had a recent diagnosis of pneumonia either by PCP or at a hospital?: No     HUBERT Maza RN  [address] [facility]  [phone] office/cell  [phone] fax  [email or web address]

## 2021-02-23 ENCOUNTER — OFFICE VISIT (OUTPATIENT)
Dept: FAMILY MEDICINE CLINIC | Age: 76
End: 2021-02-23
Payer: MEDICARE

## 2021-02-23 VITALS
WEIGHT: 88 LBS | DIASTOLIC BLOOD PRESSURE: 82 MMHG | OXYGEN SATURATION: 92 % | SYSTOLIC BLOOD PRESSURE: 120 MMHG | BODY MASS INDEX: 16.1 KG/M2 | HEART RATE: 93 BPM

## 2021-02-23 DIAGNOSIS — F41.9 ANXIETY: ICD-10-CM

## 2021-02-23 DIAGNOSIS — J44.9 CHRONIC OBSTRUCTIVE PULMONARY DISEASE, UNSPECIFIED COPD TYPE (HCC): ICD-10-CM

## 2021-02-23 DIAGNOSIS — F51.01 PRIMARY INSOMNIA: ICD-10-CM

## 2021-02-23 DIAGNOSIS — K21.9 GASTROESOPHAGEAL REFLUX DISEASE WITHOUT ESOPHAGITIS: ICD-10-CM

## 2021-02-23 PROCEDURE — 99214 OFFICE O/P EST MOD 30 MIN: CPT | Performed by: FAMILY MEDICINE

## 2021-02-23 RX ORDER — TRAZODONE HYDROCHLORIDE 50 MG/1
TABLET ORAL
Qty: 90 TABLET | Refills: 3 | Status: SHIPPED | OUTPATIENT
Start: 2021-02-23

## 2021-02-23 RX ORDER — PANTOPRAZOLE SODIUM 40 MG/1
40 TABLET, DELAYED RELEASE ORAL DAILY
Qty: 90 TABLET | Refills: 3 | Status: SHIPPED | OUTPATIENT
Start: 2021-02-23 | End: 2021-05-26

## 2021-02-23 RX ORDER — BUDESONIDE AND FORMOTEROL FUMARATE DIHYDRATE 160; 4.5 UG/1; UG/1
2 AEROSOL RESPIRATORY (INHALATION) 2 TIMES DAILY
Qty: 3 INHALER | Refills: 1 | Status: SHIPPED | OUTPATIENT
Start: 2021-02-23 | End: 2021-11-10

## 2021-02-23 RX ORDER — CLONAZEPAM 1 MG/1
TABLET ORAL
Qty: 60 TABLET | Refills: 3 | Status: SHIPPED | OUTPATIENT
Start: 2021-02-23 | End: 2021-09-24 | Stop reason: SDUPTHER

## 2021-02-23 ASSESSMENT — ENCOUNTER SYMPTOMS
WHEEZING: 0
BACK PAIN: 0
CONSTIPATION: 0
DIARRHEA: 0
SHORTNESS OF BREATH: 0
COUGH: 0

## 2021-02-23 NOTE — PROGRESS NOTES
Catalina Alvarez  20    Chief Complaint   Patient presents with    6 Month Follow-Up    Gastroesophageal Reflux     Patient states having acid reflux has been bother some    Anxiety    Depression    COPD    Medication Refill           Patient here for 6 months f/u regarding anxiety, depression, COPD, and GERG, her COPD under control, her anxiety and depression under control, she stopped taking the GERD medication and her acid reflex flare up. Gastroesophageal Reflux  She reports no chest pain, no coughing or no wheezing. Pertinent negatives include no fatigue. Past Medical History:   Diagnosis Date    Former smoker 2018    Hypertension     Moderate COPD (chronic obstructive pulmonary disease) ([facility] [address] 2018    Other emphysema ([facility] [address] 2018     Past Surgical History:   Procedure Laterality Date    ABDOMEN SURGERY      CHOLECYSTECTOMY      HYSTERECTOMY      OTHER SURGICAL HISTORY  2018    exp lap with TUNG     Family History   Problem Relation Age of Onset    Heart Disease Mother     Diabetes Mother     Cancer Father     Diabetes Brother      Social History     Socioeconomic History    Marital status:      Spouse name: Not on file    Number of children: Not on file    Years of education: Not on file    Highest education level: Not on file   Occupational History    Not on file   Social Needs    Financial resource strain: Not very hard    Food insecurity     Worry: Never true     Inability: Never true    Transportation needs     Medical: No     Non-medical: No   Tobacco Use    Smoking status: Current Every Day Smoker     Packs/day: 0.50     Years: 50.00     Pack years: 25.00     Types: Cigarettes     Last attempt to quit: 2018     Years since quittin.2    Smokeless tobacco: Never Used    Tobacco comment: Off and on smoker pt states.    Substance and Sexual Activity    Alcohol use: No    Drug use: No    Sexual activity: Not Currently 11/16/20 94 lb (42.6 kg)   10/29/20 94 lb 6.4 oz (42.8 kg)         Physical Exam  Constitutional:       General: She is not in acute distress. Appearance: Normal appearance. She is well-developed. She is not ill-appearing or diaphoretic. HENT:      Head: Normocephalic and atraumatic. Eyes:      General: No scleral icterus. Pupils: Pupils are equal, round, and reactive to light. Neck:      Musculoskeletal: Normal range of motion and neck supple. Cardiovascular:      Rate and Rhythm: Normal rate and regular rhythm. Heart sounds: Normal heart sounds. No murmur. Pulmonary:      Effort: Pulmonary effort is normal.      Breath sounds: Normal breath sounds. No wheezing. Musculoskeletal: Normal range of motion. Right lower leg: No edema. Left lower leg: No edema. Neurological:      General: No focal deficit present. Mental Status: She is alert and oriented to person, place, and time. Psychiatric:         Mood and Affect: Mood normal.         Behavior: Behavior normal.         ASSESSMENT/ PLAN:    1. Gastroesophageal reflux disease without esophagitis  - restart:  - pantoprazole (PROTONIX) 40 MG tablet; Take 1 tablet by mouth daily  Dispense: 90 tablet; Refill: 3    2. Primary insomnia  - stable  - traZODone (DESYREL) 50 MG tablet; TAKE 1 TABLET NIGHTLY  Dispense: 90 tablet; Refill: 3    3. Chronic obstructive pulmonary disease, unspecified COPD type (HCC)  - stable  - budesonide-formoterol (SYMBICORT) 160-4.5 MCG/ACT AERO; Inhale 2 puffs into the lungs 2 times daily  Dispense: 3 Inhaler; Refill: 1    4. Anxiety  - stable  - clonazePAM (KLONOPIN) 1 MG tablet; TAKE 1 TABLET TWICE DAILY  AS NEEDED FOR ANXIETY  Dispense: 60 tablet; Refill: 3              - All old blood work reviewed with the patient  - Appropriate prescription are addressed. - After visit summery provided.   - Questions answered and patient verbalizes understanding.  - Call for any problem, questions, or

## 2021-02-23 NOTE — LETTER
[address] [facility]  [address] [facility],[address] [facility]  Phone: [phone]  Fax: [phone]    Collis Gitelman, MD        February 23, 2021     Patient: Nathen Smith   YOB: 1945   Date of Visit: 2/23/2021       To Whom It May Concern: It is my medical opinion that Karen Minors requires a disability parking placard for the following reasons:  Hypertension  Chronic Obstructive Pulmonary disease  Duration of need: 5 years    If you have any questions or concerns, please don't hesitate to call.     Sincerely,        Collis Gitelman, MD

## 2021-03-03 ENCOUNTER — CARE COORDINATION (OUTPATIENT)
Dept: CARE COORDINATION | Age: 76
End: 2021-03-03

## 2021-03-03 NOTE — CARE COORDINATION
Call to check on pt status & assess for further CC needs. LM with ACM contact information & requested a call back. Will attempt 1 additional outreach & if no response will discontinue Care Coordination. MISHEL LayneN RN  Ambulatory Care Manager  [phone] office/cell  [phone] fax  [email or web address]

## 2021-03-18 ENCOUNTER — CARE COORDINATION (OUTPATIENT)
Dept: CARE COORDINATION | Age: 76
End: 2021-03-18

## 2021-03-18 ASSESSMENT — ENCOUNTER SYMPTOMS: DYSPNEA ASSOCIATED WITH: EXERTION

## 2021-03-18 NOTE — CARE COORDINATION
Ambulatory Care Coordination Note  3/18/2021  CM Risk Score: 6  Charlson 10 Year Mortality Risk Score: 47%     ACC: Camilo Pereira RN    Summary Note: Call to assess for additional Care coordination goals or graduate pt. Pt reports she is doing well. She denies any current resource needs at present. Reminded pt of upcoming appt with PCP. Pt will be graduated from Care Coordination at this time & understands she can contact PCP office for future needs. Care Coordination Interventions    Program Enrollment: Complex Care  Referral from Primary Care Provider: No  Suggested Interventions and Community Resources  Pulmonary Rehab: Declined  Smoking Cessation: Declined  Zone Management Tools: Completed (Comment: 11/17/20-COPD)         Goals Addressed    None         Prior to Admission medications    Medication Sig Start Date End Date Taking?  Authorizing Provider   pantoprazole (PROTONIX) 40 MG tablet Take 1 tablet by mouth daily 2/23/21   Annetta Garcia MD   traZODone (DESYREL) 50 MG tablet TAKE 1 TABLET NIGHTLY 2/23/21   Annetta Garcia MD   budesonide-formoterol (SYMBICORT) 160-4.5 MCG/ACT AERO Inhale 2 puffs into the lungs 2 times daily 2/23/21   Annetta Garcia MD   clonazePAM (KLONOPIN) 1 MG tablet TAKE 1 TABLET TWICE DAILY  AS NEEDED FOR ANXIETY 2/23/21 3/23/21  Annetta Garcia MD   tiZANidine (ZANAFLEX) 4 MG tablet TAKE 1 TABLET BY MOUTH EVERY DAY AT NIGHT 1/25/21   Annetta Garcia MD   albuterol sulfate  (90 Base) MCG/ACT inhaler USE 2 INHALATIONS ORALLY INTO THE LUNGS EVERY 6 HOURS AS NEEDED FOR WHEEZING OR SHORTNESS OF BREATH 10/13/20   Annetta Garcia MD   montelukast (SINGULAIR) 10 MG tablet TAKE 1 TABLET NIGHTLY 5/26/20   Annetta Garcia MD   sodium chloride (OCEAN, BABY AYR) 0.65 % nasal spray 1 spray by Nasal route as needed for Congestion    Historical Provider, MD   melatonin 5 MG TABS tablet Take 5 mg by mouth nightly    Historical Provider, MD   aspirin 81 MG tablet Take 81 mg by mouth daily.    Historical Provider, MD       Future Appointments   Date Time Provider Maria Del Carmen Carson   5/26/2021  9:45 AM MD Dave Acosta   11/4/2021  1:30 PM MD Dave Acosta      and   COPD Assessment    Does the patient understand envrionmental exposure?: No  Is the patient able to verbalize Rescue vs. Long Acting medications?: Yes  Does the patient have a nebulizer?: No  Does the patient use a space with inhaled medications?: No     No patient-reported symptoms         Symptoms:     Symptom course: stable  Breathlessness: exertion  Increase use of rapid acting/rescue inhaled medications?: No  Change in chronic cough?: No/At Baseline  Change in sputum?: No/At Baseline  Sputum characteristics: Unable to Specify  Self Monitoring - SaO2: No  Have you had a recent diagnosis of pneumonia either by PCP or at a hospital?: No     HUBERT Monreal RN  [address] [facility]  [phone] office/cell  [phone] fax  [email or web address]

## 2021-05-26 ENCOUNTER — OFFICE VISIT (OUTPATIENT)
Dept: FAMILY MEDICINE CLINIC | Age: 76
End: 2021-05-26
Payer: MEDICARE

## 2021-05-26 VITALS
BODY MASS INDEX: 15.64 KG/M2 | DIASTOLIC BLOOD PRESSURE: 84 MMHG | HEART RATE: 93 BPM | OXYGEN SATURATION: 93 % | HEIGHT: 62 IN | WEIGHT: 85 LBS | SYSTOLIC BLOOD PRESSURE: 138 MMHG

## 2021-05-26 DIAGNOSIS — K21.9 GASTROESOPHAGEAL REFLUX DISEASE WITHOUT ESOPHAGITIS: ICD-10-CM

## 2021-05-26 DIAGNOSIS — D64.9 ANEMIA, UNSPECIFIED TYPE: ICD-10-CM

## 2021-05-26 DIAGNOSIS — F41.9 ANXIETY: Primary | ICD-10-CM

## 2021-05-26 DIAGNOSIS — F41.9 ANXIETY: ICD-10-CM

## 2021-05-26 LAB
A/G RATIO: 2.9 (ref 1.1–2.2)
ALBUMIN SERPL-MCNC: 4.6 G/DL (ref 3.4–5)
ALP BLD-CCNC: 82 U/L (ref 40–129)
ALT SERPL-CCNC: 12 U/L (ref 10–40)
ANION GAP SERPL CALCULATED.3IONS-SCNC: 12 MMOL/L (ref 3–16)
AST SERPL-CCNC: 21 U/L (ref 15–37)
BASOPHILS ABSOLUTE: 0 K/UL (ref 0–0.2)
BASOPHILS RELATIVE PERCENT: 0.4 %
BILIRUB SERPL-MCNC: 0.4 MG/DL (ref 0–1)
BUN BLDV-MCNC: 10 MG/DL (ref 7–20)
CALCIUM SERPL-MCNC: 9.4 MG/DL (ref 8.3–10.6)
CHLORIDE BLD-SCNC: 100 MMOL/L (ref 99–110)
CO2: 32 MMOL/L (ref 21–32)
CREAT SERPL-MCNC: 0.6 MG/DL (ref 0.6–1.2)
EOSINOPHILS ABSOLUTE: 0.1 K/UL (ref 0–0.6)
EOSINOPHILS RELATIVE PERCENT: 2.6 %
FOLATE: 7.89 NG/ML (ref 4.78–24.2)
GFR AFRICAN AMERICAN: >60
GFR NON-AFRICAN AMERICAN: >60
GLOBULIN: 1.6 G/DL
GLUCOSE FASTING: 86 MG/DL (ref 70–99)
HCT VFR BLD CALC: 47.6 % (ref 36–48)
HEMOGLOBIN: 15.8 G/DL (ref 12–16)
LYMPHOCYTES ABSOLUTE: 2.2 K/UL (ref 1–5.1)
LYMPHOCYTES RELATIVE PERCENT: 42.9 %
MCH RBC QN AUTO: 31.1 PG (ref 26–34)
MCHC RBC AUTO-ENTMCNC: 33.3 G/DL (ref 31–36)
MCV RBC AUTO: 93.4 FL (ref 80–100)
MONOCYTES ABSOLUTE: 0.7 K/UL (ref 0–1.3)
MONOCYTES RELATIVE PERCENT: 13.2 %
NEUTROPHILS ABSOLUTE: 2.1 K/UL (ref 1.7–7.7)
NEUTROPHILS RELATIVE PERCENT: 40.9 %
PDW BLD-RTO: 15.4 % (ref 12.4–15.4)
PLATELET # BLD: 173 K/UL (ref 135–450)
PMV BLD AUTO: 10.4 FL (ref 5–10.5)
POTASSIUM SERPL-SCNC: 4.1 MMOL/L (ref 3.5–5.1)
RBC # BLD: 5.1 M/UL (ref 4–5.2)
SODIUM BLD-SCNC: 144 MMOL/L (ref 136–145)
T4 FREE: 1.6 NG/DL (ref 0.9–1.8)
TOTAL PROTEIN: 6.2 G/DL (ref 6.4–8.2)
TSH SERPL DL<=0.05 MIU/L-ACNC: 2.29 UIU/ML (ref 0.27–4.2)
VITAMIN B-12: 597 PG/ML (ref 211–911)
WBC # BLD: 5.2 K/UL (ref 4–11)

## 2021-05-26 PROCEDURE — 99214 OFFICE O/P EST MOD 30 MIN: CPT | Performed by: FAMILY MEDICINE

## 2021-05-26 RX ORDER — OMEPRAZOLE 20 MG/1
20 CAPSULE, DELAYED RELEASE ORAL
Qty: 90 CAPSULE | Refills: 3 | Status: SHIPPED | OUTPATIENT
Start: 2021-05-26

## 2021-05-26 ASSESSMENT — ENCOUNTER SYMPTOMS
DIARRHEA: 0
CONSTIPATION: 0
WHEEZING: 0
SHORTNESS OF BREATH: 0
BACK PAIN: 0
COUGH: 0

## 2021-05-26 NOTE — PROGRESS NOTES
Radha Guthrie  20    Chief Complaint   Patient presents with    3 Month Follow-Up    Gastroesophageal Reflux     need something, asking about omeprazole            Patient here for 3 months f/u regarding anxiety, depression, COPD, and GERG, her COPD under control, her anxiety and depression under control, last time told us about stopped taking the GERD medication, we reorder the Protonix, but patient still not taking them, and stats her GERD is acting up and she even cannot eat with the heartburn, and she lost 3 pounds since last visit. Patient stats would like to try the omeprazole. Past Medical History:   Diagnosis Date    Former smoker 2018    Hypertension     Moderate COPD (chronic obstructive pulmonary disease) ([facility] [address] 2018    Other emphysema ([facility] [address] 2018     Past Surgical History:   Procedure Laterality Date    ABDOMEN SURGERY      CHOLECYSTECTOMY      HYSTERECTOMY      OTHER SURGICAL HISTORY  2018    exp lap with TUNG     Family History   Problem Relation Age of Onset    Heart Disease Mother     Diabetes Mother     Cancer Father     Diabetes Brother      Social History     Socioeconomic History    Marital status:      Spouse name: Not on file    Number of children: Not on file    Years of education: Not on file    Highest education level: Not on file   Occupational History    Not on file   Tobacco Use    Smoking status: Current Some Day Smoker     Packs/day: 0.50     Years: 50.00     Pack years: 25.00     Types: Cigarettes     Last attempt to quit: 2018     Years since quittin.5    Smokeless tobacco: Never Used    Tobacco comment: Off and on smoker pt states.    Vaping Use    Vaping Use: Never used   Substance and Sexual Activity    Alcohol use: No    Drug use: No    Sexual activity: Not Currently   Other Topics Concern    Not on file   Social History Narrative    Not on file     Social Determinants of Health     Financial Resource Strain: Low Risk     Difficulty of Paying Living Expenses: Not very hard   Food Insecurity: No Food Insecurity    Worried About Running Out of Food in the Last Year: Never true    Allyssa of Food in the Last Year: Never true   Transportation Needs: No Transportation Needs    Lack of Transportation (Medical): No    Lack of Transportation (Non-Medical): No   Physical Activity: Inactive    Days of Exercise per Week: 0 days    Minutes of Exercise per Session: 0 min   Stress: No Stress Concern Present    Feeling of Stress : Only a little   Social Connections: Socially Isolated    Frequency of Communication with Friends and Family: More than three times a week    Frequency of Social Gatherings with Friends and Family: Twice a week    Attends Christian Services: Never    Active Member of Clubs or Organizations: No    Attends Club or Organization Meetings: Never    Marital Status:    Intimate Partner Violence:     Fear of Current or Ex-Partner:     Emotionally Abused:     Physically Abused:     Sexually Abused:         Allergies   Allergen Reactions    Pravastatin Sodium Other (See Comments)     Myalgias (note from 5/15/18)     Current Outpatient Medications   Medication Sig Dispense Refill    omeprazole (PRILOSEC) 20 MG delayed release capsule Take 1 capsule by mouth every morning (before breakfast) 90 capsule 3    tiZANidine (ZANAFLEX) 4 MG tablet TAKE 1 TABLET BY MOUTH EVERY DAY AT NIGHT 30 tablet 5    traZODone (DESYREL) 50 MG tablet TAKE 1 TABLET NIGHTLY 90 tablet 3    budesonide-formoterol (SYMBICORT) 160-4.5 MCG/ACT AERO Inhale 2 puffs into the lungs 2 times daily 3 Inhaler 1    clonazePAM (KLONOPIN) 1 MG tablet TAKE 1 TABLET TWICE DAILY  AS NEEDED FOR ANXIETY 60 tablet 3    albuterol sulfate  (90 Base) MCG/ACT inhaler USE 2 INHALATIONS ORALLY INTO THE LUNGS EVERY 6 HOURS AS NEEDED FOR WHEEZING OR SHORTNESS OF BREATH 54 g 3    montelukast (SINGULAIR) 10 MG tablet TAKE type  -We will recheck the blood work  - Comprehensive Metabolic Panel, Fasting; Future  - CBC Auto Differential; Future  - Vitamin B12 & Folate; Future              - All old blood work reviewed with the patient  - Appropriate prescription are addressed. - After visit summery provided. - Questions answered and patient verbalizes understanding.  - Call for any problem, questions, or concerns. Return in about 3 months (around 8/26/2021).

## 2021-06-15 DIAGNOSIS — J44.9 CHRONIC OBSTRUCTIVE PULMONARY DISEASE, UNSPECIFIED COPD TYPE (HCC): ICD-10-CM

## 2021-06-16 RX ORDER — ALBUTEROL SULFATE 90 UG/1
AEROSOL, METERED RESPIRATORY (INHALATION)
Qty: 54 G | Refills: 3 | Status: SHIPPED | OUTPATIENT
Start: 2021-06-16

## 2021-06-16 RX ORDER — MONTELUKAST SODIUM 10 MG/1
TABLET ORAL
Qty: 90 TABLET | Refills: 3 | Status: SHIPPED | OUTPATIENT
Start: 2021-06-16

## 2021-09-15 RX ORDER — TIZANIDINE 4 MG/1
TABLET ORAL
Qty: 30 TABLET | Refills: 5 | Status: SHIPPED | OUTPATIENT
Start: 2021-09-15

## 2021-09-21 ENCOUNTER — OFFICE VISIT (OUTPATIENT)
Dept: FAMILY MEDICINE CLINIC | Age: 76
End: 2021-09-21
Payer: MEDICARE

## 2021-09-21 VITALS
DIASTOLIC BLOOD PRESSURE: 79 MMHG | OXYGEN SATURATION: 94 % | BODY MASS INDEX: 15.14 KG/M2 | WEIGHT: 82.8 LBS | SYSTOLIC BLOOD PRESSURE: 127 MMHG | HEART RATE: 71 BPM

## 2021-09-21 DIAGNOSIS — F32.A ANXIETY AND DEPRESSION: Primary | ICD-10-CM

## 2021-09-21 DIAGNOSIS — J44.9 CHRONIC OBSTRUCTIVE PULMONARY DISEASE, UNSPECIFIED COPD TYPE (HCC): ICD-10-CM

## 2021-09-21 DIAGNOSIS — K21.9 GASTROESOPHAGEAL REFLUX DISEASE WITHOUT ESOPHAGITIS: ICD-10-CM

## 2021-09-21 DIAGNOSIS — F41.9 ANXIETY AND DEPRESSION: Primary | ICD-10-CM

## 2021-09-21 PROCEDURE — 99213 OFFICE O/P EST LOW 20 MIN: CPT | Performed by: FAMILY MEDICINE

## 2021-09-21 NOTE — PROGRESS NOTES
Dolores Pacheco  20    Chief Complaint   Patient presents with    Other     Patient here for 4 month F/U    Anxiety    Depression    Gastroesophageal Reflux    COPD           Patient here for 4 months f/u regarding anxiety, depression, COPD, and GERG, her COPD under control, her anxiety and depression under control, her anxiety and depression stable, her GERD under control, patient feeling great and has no complaints. She didn't gain any more wt, she lost few ibs. Past Medical History:   Diagnosis Date    Former smoker 2018    Hypertension     Moderate COPD (chronic obstructive pulmonary disease) ([facility] [address] 2018    Other emphysema ([facility] [address] 2018     Past Surgical History:   Procedure Laterality Date    ABDOMEN SURGERY      CHOLECYSTECTOMY      HYSTERECTOMY      OTHER SURGICAL HISTORY  2018    exp lap with TUNG     Family History   Problem Relation Age of Onset    Heart Disease Mother     Diabetes Mother     Cancer Father     Diabetes Brother      Social History     Socioeconomic History    Marital status:      Spouse name: Not on file    Number of children: Not on file    Years of education: Not on file    Highest education level: Not on file   Occupational History    Not on file   Tobacco Use    Smoking status: Current Some Day Smoker     Packs/day: 0.50     Years: 50.00     Pack years: 25.00     Types: Cigarettes     Last attempt to quit: 2018     Years since quittin.8    Smokeless tobacco: Never Used    Tobacco comment: Off and on smoker pt states.    Vaping Use    Vaping Use: Never used   Substance and Sexual Activity    Alcohol use: No    Drug use: No    Sexual activity: Not Currently   Other Topics Concern    Not on file   Social History Narrative    Not on file     Social Determinants of Health     Financial Resource Strain: Low Risk     Difficulty of Paying Living Expenses: Not very hard   Food Insecurity: No Food Insecurity    Worried About [facility] in the Last Year: Never true    Allyssa of Food in the Last Year: Never true   Transportation Needs: No Transportation Needs    Lack of Transportation (Medical): No    Lack of Transportation (Non-Medical): No   Physical Activity: Inactive    Days of Exercise per Week: 0 days    Minutes of Exercise per Session: 0 min   Stress: No Stress Concern Present    Feeling of Stress : Only a little   Social Connections: Socially Isolated    Frequency of Communication with Friends and Family: More than three times a week    Frequency of Social Gatherings with Friends and Family: Twice a week    Attends Holiness Services: Never    Active Member of Clubs or Organizations: No    Attends Club or Organization Meetings: Never    Marital Status:    Intimate Partner Violence:     Fear of Current or Ex-Partner:     Emotionally Abused:     Physically Abused:     Sexually Abused:         Allergies   Allergen Reactions    Pravastatin Sodium Other (See Comments)     Myalgias (note from 5/15/18)     Current Outpatient Medications   Medication Sig Dispense Refill    tiZANidine (ZANAFLEX) 4 MG tablet TAKE 1 TABLET BY MOUTH EVERY DAY AT NIGHT 30 tablet 5    albuterol sulfate  (90 Base) MCG/ACT inhaler USE 2 INHALATIONS ORALLY INTO THE LUNGS EVERY 6 HOURS AS NEEDED FOR WHEEZING OR SHORTNESS OF BREATH 54 g 3    montelukast (SINGULAIR) 10 MG tablet TAKE 1 TABLET NIGHTLY 90 tablet 3    omeprazole (PRILOSEC) 20 MG delayed release capsule Take 1 capsule by mouth every morning (before breakfast) 90 capsule 3    traZODone (DESYREL) 50 MG tablet TAKE 1 TABLET NIGHTLY 90 tablet 3    budesonide-formoterol (SYMBICORT) 160-4.5 MCG/ACT AERO Inhale 2 puffs into the lungs 2 times daily 3 Inhaler 1    sodium chloride (OCEAN, BABY AYR) 0.65 % nasal spray 1 spray by Nasal route as needed for Congestion      melatonin 5 MG TABS tablet Take 5 mg by mouth nightly      aspirin 81 MG tablet Take 81 mg by mouth daily.  clonazePAM (KLONOPIN) 1 MG tablet TAKE 1 TABLET TWICE DAILY  AS NEEDED FOR ANXIETY 60 tablet 3     No current facility-administered medications for this visit. Review of Systems   Constitutional: Positive for appetite change. Negative for activity change, chills, fatigue and fever. HENT: Negative for congestion. Respiratory: Negative for cough and shortness of breath. Cardiovascular: Negative for chest pain and leg swelling. Gastrointestinal: Negative for abdominal pain, constipation, diarrhea, nausea and vomiting. Musculoskeletal: Negative for arthralgias and myalgias. Neurological: Negative for dizziness and headaches. Psychiatric/Behavioral: Positive for agitation and sleep disturbance. Negative for decreased concentration. The patient is nervous/anxious.          Getting better       Lab Results   Component Value Date    WBC 5.2 05/26/2021    HGB 15.8 05/26/2021    HCT 47.6 05/26/2021    MCV 93.4 05/26/2021     05/26/2021     Lab Results   Component Value Date     05/26/2021    K 4.1 05/26/2021     05/26/2021    CO2 32 05/26/2021    BUN 10 05/26/2021    CREATININE 0.6 05/26/2021    GLUCOSE 103 (H) 03/22/2020    CALCIUM 9.4 05/26/2021    PROT 6.2 (L) 05/26/2021    LABALBU 4.6 05/26/2021    BILITOT 0.4 05/26/2021    ALKPHOS 82 05/26/2021    AST 21 05/26/2021    ALT 12 05/26/2021    LABGLOM >60 05/26/2021    GFRAA >60 05/26/2021    AGRATIO 2.9 (H) 05/26/2021    GLOB 1.6 05/26/2021     Lab Results   Component Value Date    CHOL 164 05/16/2018    CHOL 167 07/10/2011    CHOL 188 05/18/2011     Lab Results   Component Value Date    TRIG 93 05/16/2018    TRIG 74 07/10/2011    TRIG 131 05/18/2011     Lab Results   Component Value Date    HDL 62 05/16/2018    HDL 47 (L) 07/10/2011    HDL 57 (L) 05/18/2011     Lab Results   Component Value Date    LDLCALC 83 05/16/2018     No results found for: LABA1C  Lab Results   Component Value Date    TSH 2.29

## 2021-09-22 ASSESSMENT — ENCOUNTER SYMPTOMS
NAUSEA: 0
SHORTNESS OF BREATH: 0
ABDOMINAL PAIN: 0
DIARRHEA: 0
VOMITING: 0
COUGH: 0
CONSTIPATION: 0

## 2021-09-24 DIAGNOSIS — F41.9 ANXIETY: ICD-10-CM

## 2021-09-24 RX ORDER — CLONAZEPAM 1 MG/1
TABLET ORAL
Qty: 60 TABLET | Refills: 3 | Status: SHIPPED | OUTPATIENT
Start: 2021-09-24 | End: 2021-12-01

## 2021-09-24 RX ORDER — CLONAZEPAM 1 MG/1
TABLET ORAL
Qty: 60 TABLET | Refills: 3 | OUTPATIENT
Start: 2021-09-24

## 2021-10-20 ENCOUNTER — HOSPITAL ENCOUNTER (OUTPATIENT)
Dept: GENERAL RADIOLOGY | Age: 76
Discharge: HOME | End: 2021-10-20
Payer: MEDICARE

## 2021-10-20 ENCOUNTER — HOSPITAL ENCOUNTER (OUTPATIENT)
Age: 76
Discharge: HOME | End: 2021-10-20
Payer: MEDICARE

## 2021-10-20 ENCOUNTER — OFFICE VISIT (OUTPATIENT)
Dept: FAMILY MEDICINE CLINIC | Age: 76
End: 2021-10-20
Payer: MEDICARE

## 2021-10-20 VITALS
WEIGHT: 81.6 LBS | OXYGEN SATURATION: 94 % | DIASTOLIC BLOOD PRESSURE: 76 MMHG | HEART RATE: 78 BPM | SYSTOLIC BLOOD PRESSURE: 114 MMHG | BODY MASS INDEX: 15.01 KG/M2 | HEIGHT: 62 IN
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
## 2021-10-20 DIAGNOSIS — M79.89 SWELLING OF LEFT HAND: ICD-10-CM
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
## 2021-10-20 PROCEDURE — 73130 X-RAY EXAM OF HAND: CPT

## 2021-10-20 PROCEDURE — 99213 OFFICE O/P EST LOW 20 MIN: CPT | Performed by: NURSE PRACTITIONER

## 2021-10-20 RX ORDER — AMOXICILLIN AND CLAVULANATE POTASSIUM 875; 125 MG/1; MG/1
1 TABLET, FILM COATED ORAL 2 TIMES DAILY
Qty: 20 TABLET | Refills: 0 | Status: SHIPPED | OUTPATIENT
Start: 2021-10-20 | End: 2021-10-30

## 2021-10-20 NOTE — PROGRESS NOTES
Ale Parsons   67 y.o.  female  [id number]      Chief Complaint   Patient presents with    Animal Bite     pt. here today for cat bite to L hand around 2pm yesterday. Pt. states it was very painful lastnight and swollen. Pt. states cat was not up to date on vaccinations. Subjective:  67 y. o.female is here for a follow up. She has the following chronic/acute medical problems:  Patient Active Problem List   Diagnosis    Essential hypertension    Mixed hyperlipidemia    Anxiety    Primary insomnia    Lung nodule    Other emphysema ([address]    Former smoker    Chronic obstructive pulmonary disease ([facility] [address]    Vaginal dryness    H/O total hysterectomy    Vaginal atrophy    Leg cramping    Bowel obstruction (HCC)    SBO (small bowel obstruction) (HCC)    Adynamic ileus (HCC)    Tobacco abuse    Lower abdominal pain    Nausea    Abnormal CT of the abdomen    Ileus ([facility] [address]    Gastroesophageal reflux disease without esophagitis    Anxiety and depression    Poor appetite    Acute on chronic respiratory failure with hypoxia ([facility])    Anemia       Patient states yesterday her sisters in door cat bit her on left hand. Patient states her left hand is slightly swollen and red around the bite. Patient states the bite is small. Patient states she does have some mild pain. Patient states she is worried it is infected. Review of Systems   Constitutional: Negative for appetite change, chills, fatigue and fever. HENT: Negative for congestion, ear pain, postnasal drip, rhinorrhea, sinus pressure, sinus pain, sneezing and sore throat. Respiratory: Negative for cough, chest tightness, shortness of breath and wheezing. Cardiovascular: Negative for chest pain and palpitations. Gastrointestinal: Negative for diarrhea, nausea and vomiting. Skin: Positive for wound. Negative for rash. Neurological: Negative for dizziness, light-headedness and headaches.        Current Outpatient Medications Medication Sig Dispense Refill    VITAMIN A PO Take by mouth      Ascorbic Acid (VITAMIN C ADULT GUMMIES PO) Take by mouth      VITAMIN D PO Take by mouth      clonazePAM (KLONOPIN) 1 MG tablet TAKE 1 TABLET TWICE DAILY  AS NEEDED FOR ANXIETY 60 tablet 3    tiZANidine (ZANAFLEX) 4 MG tablet TAKE 1 TABLET BY MOUTH EVERY DAY AT NIGHT 30 tablet 5    albuterol sulfate  (90 Base) MCG/ACT inhaler USE 2 INHALATIONS ORALLY INTO THE LUNGS EVERY 6 HOURS AS NEEDED FOR WHEEZING OR SHORTNESS OF BREATH 54 g 3    montelukast (SINGULAIR) 10 MG tablet TAKE 1 TABLET NIGHTLY 90 tablet 3    omeprazole (PRILOSEC) 20 MG delayed release capsule Take 1 capsule by mouth every morning (before breakfast) 90 capsule 3    traZODone (DESYREL) 50 MG tablet TAKE 1 TABLET NIGHTLY 90 tablet 3    budesonide-formoterol (SYMBICORT) 160-4.5 MCG/ACT AERO Inhale 2 puffs into the lungs 2 times daily 3 Inhaler 1    sodium chloride (OCEAN, BABY AYR) 0.65 % nasal spray 1 spray by Nasal route as needed for Congestion      melatonin 5 MG TABS tablet Take 5 mg by mouth nightly      aspirin 81 MG tablet Take 81 mg by mouth daily.  doxycycline hyclate (VIBRA-TABS) 100 MG tablet Take 1 tablet by mouth 2 times daily for 7 days 14 tablet 0    benzonatate (TESSALON PERLES) 100 MG capsule Take 1 capsule by mouth 3 times daily as needed for Cough 20 capsule 0     No current facility-administered medications for this visit. Past medical, family,surgical history reviewed today. Objective:  /76   Pulse 78   Ht 5' 2\" (1.575 m)   Wt 81 lb 9.6 oz (37 kg)   SpO2 94%   BMI 14.92 kg/m²   BP Readings from Last 3 Encounters:   11/01/21 (!) 148/92   10/20/21 114/76   09/21/21 127/79     Wt Readings from Last 3 Encounters:   11/01/21 84 lb 6.4 oz (38.3 kg)   10/20/21 81 lb 9.6 oz (37 kg)   09/21/21 82 lb 12.8 oz (37.6 kg)         Physical Exam  Constitutional:       Appearance: Normal appearance. HENT:      Head: Normocephalic. Cardiovascular:      Rate and Rhythm: Normal rate and regular rhythm. Heart sounds: Normal heart sounds. Pulmonary:      Effort: Pulmonary effort is normal.      Breath sounds: Normal breath sounds. Musculoskeletal:      Cervical back: Neck supple. Skin:     General: Skin is warm and dry. Findings: Wound present. Comments: Dorsal side of left hand - small bite - erythema noted around the bite. No drainage noted. Neurological:      Mental Status: She is alert and oriented to person, place, and time. Psychiatric:         Mood and Affect: Mood normal.         Behavior: Behavior normal.         Lab Results   Component Value Date    WBC 5.2 05/26/2021    HGB 15.8 05/26/2021    HCT 47.6 05/26/2021    MCV 93.4 05/26/2021     05/26/2021     Lab Results   Component Value Date     05/26/2021    K 4.1 05/26/2021     05/26/2021    CO2 32 05/26/2021    BUN 10 05/26/2021    CREATININE 0.6 05/26/2021    GLUCOSE 103 (H) 03/22/2020    CALCIUM 9.4 05/26/2021    PROT 6.2 (L) 05/26/2021    LABALBU 4.6 05/26/2021    BILITOT 0.4 05/26/2021    ALKPHOS 82 05/26/2021    AST 21 05/26/2021    ALT 12 05/26/2021    LABGLOM >60 05/26/2021    GFRAA >60 05/26/2021    AGRATIO 2.9 (H) 05/26/2021    GLOB 1.6 05/26/2021     Lab Results   Component Value Date    CHOL 164 05/16/2018    CHOL 167 07/10/2011    CHOL 188 05/18/2011     Lab Results   Component Value Date    TRIG 93 05/16/2018    TRIG 74 07/10/2011    TRIG 131 05/18/2011     Lab Results   Component Value Date    HDL 62 05/16/2018    HDL 47 (L) 07/10/2011    HDL 57 (L) 05/18/2011     Lab Results   Component Value Date    LDLCALC 83 05/16/2018     No results found for: LABA1C  Lab Results   Component Value Date    TSH 2.29 05/26/2021    TSHHS 1.330 07/10/2011         ASSESSMENT/PLAN:      1. Cat bite, initial encounter  - XR HAND LEFT (MIN 3 VIEWS); Future    2. Swelling of left hand  - XR HAND LEFT (MIN 3 VIEWS); Future    3.  Infected cat bite, initial encounter  Start Augmentin. Advised patient if the erythema becomes worse, develops a fever, and/or notices red streaks around the area to go to the emergency room. - amoxicillin-clavulanate (AUGMENTIN) 875-125 MG per tablet; Take 1 tablet by mouth 2 times daily for 10 days  Dispense: 20 tablet; Refill: 0          There are no discontinued medications. Care discussed with patient. Questions answered. Patient verbalizes understanding and agrees with plan. After visit summary provided. Advised to call for any problems, questions, or concerns. Return if symptoms worsen or fail to improve.                                              Signed:  MIREILLE Meza CNP  11/03/21  1:28 PM

## 2021-10-27 ENCOUNTER — HOSPITAL ENCOUNTER (OUTPATIENT)
Age: 76
Setting detail: SPECIMEN
Discharge: HOME | End: 2021-10-27
Payer: MEDICARE

## 2021-10-27 ENCOUNTER — OFFICE VISIT (OUTPATIENT)
Dept: FAMILY MEDICINE CLINIC | Age: 76
End: 2021-10-27
Payer: MEDICARE

## 2021-10-27 VITALS — HEART RATE: 94 BPM | TEMPERATURE: 98.1 F | OXYGEN SATURATION: 95 %

## 2021-10-27 DIAGNOSIS — J40 BRONCHITIS: Primary | ICD-10-CM
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
## 2021-10-27 DIAGNOSIS — J01.40 ACUTE NON-RECURRENT PANSINUSITIS: ICD-10-CM

## 2021-10-27 LAB
SARS-COV-2: NOT DETECTED
SOURCE: NORMAL

## 2021-10-27 PROCEDURE — U0003 INFECTIOUS AGENT DETECTION BY NUCLEIC ACID (DNA OR RNA); SEVERE ACUTE RESPIRATORY SYNDROME CORONAVIRUS 2 (SARS-COV-2) (CORONAVIRUS DISEASE [COVID-19]), AMPLIFIED PROBE TECHNIQUE, MAKING USE OF HIGH THROUGHPUT TECHNOLOGIES AS DESCRIBED BY CMS-2020-01-R: HCPCS

## 2021-10-27 PROCEDURE — 99213 OFFICE O/P EST LOW 20 MIN: CPT | Performed by: NURSE PRACTITIONER

## 2021-10-27 PROCEDURE — U0005 INFEC AGEN DETEC AMPLI PROBE: HCPCS

## 2021-10-27 RX ORDER — METHYLPREDNISOLONE 4 MG/1
TABLET ORAL
Qty: 1 KIT | Refills: 0 | Status: SHIPPED | OUTPATIENT
Start: 2021-10-27 | End: 2021-11-02

## 2021-10-27 NOTE — PROGRESS NOTES
10/27/2021    HPI:  Chief complaint and history of present illness as per medical assistant/nurse documented today in the Flu/COVID-19 clinic. Patient is here with complaints of cough, chest/nasal congestion, wheezing, and sinus pressure x 3 days. Patient states she had a covid test 2 days ago and it was negative. Patient states she was here last week for a cat bite. Patient states the area is much better but still has a little bit of redness. Patient denies any pain. Patient states she is still on the Augmentin. MEDICATIONS:  Prior to Visit Medications    Medication Sig Taking? Authorizing Provider   methylPREDNISolone (MEDROL, APARNA,) 4 MG tablet Take by mouth.  Yes MIREILLE Neely CNP   VITAMIN A PO Take by mouth  Historical Provider, MD   Ascorbic Acid (VITAMIN C ADULT GUMMIES PO) Take by mouth  Historical Provider, MD   VITAMIN D PO Take by mouth  Historical Provider, MD   amoxicillin-clavulanate (AUGMENTIN) 875-125 MG per tablet Take 1 tablet by mouth 2 times daily for 10 days  MIREILLE Neely CNP   clonazePAM (KLONOPIN) 1 MG tablet TAKE 1 TABLET TWICE DAILY  AS NEEDED FOR ANXIETY  Israel Traore MD   tiZANidine (ZANAFLEX) 4 MG tablet TAKE 1 TABLET BY MOUTH EVERY DAY AT NIGHT  Israel Traore MD   albuterol sulfate  (90 Base) MCG/ACT inhaler USE 2 INHALATIONS ORALLY INTO THE LUNGS EVERY 6 HOURS AS NEEDED FOR WHEEZING OR SHORTNESS OF BREATH  Elba Phillips MD   montelukast (SINGULAIR) 10 MG tablet TAKE 1 TABLET NIGHTLY  Israel Traore MD   omeprazole (PRILOSEC) 20 MG delayed release capsule Take 1 capsule by mouth every morning (before breakfast)  Israel Traore MD   traZODone (DESYREL) 50 MG tablet TAKE 1 TABLET NIGHTLY  Israel Traore MD   budesonide-formoterol (SYMBICORT) 160-4.5 MCG/ACT AERO Inhale 2 puffs into the lungs 2 times daily  Israel Traore MD   sodium chloride (OCEAN, BABY AYR) 0.65 % nasal spray 1 spray by Nasal route as needed for Congestion  Historical Provider, MD melatonin 5 MG TABS tablet Take 5 mg by mouth nightly  Historical Provider, MD   aspirin 81 MG tablet Take 81 mg by mouth daily. Historical Provider, MD       Allergies   Allergen Reactions    Pravastatin Sodium Other (See Comments)     Myalgias (note from 5/15/18)   ,   Past Medical History:   Diagnosis Date    Former smoker 2018    Hypertension     Moderate COPD (chronic obstructive pulmonary disease) ([facility] [address] 2018    Other emphysema ([facility] [address] 2018   ,   Past Surgical History:   Procedure Laterality Date    ABDOMEN SURGERY      CHOLECYSTECTOMY      HYSTERECTOMY      OTHER SURGICAL HISTORY  2018    exp lap with TUNG   ,   Social History     Tobacco Use    Smoking status: Current Some Day Smoker     Packs/day: 0.50     Years: 50.00     Pack years: 25.00     Types: Cigarettes     Last attempt to quit: 2018     Years since quittin.9    Smokeless tobacco: Never Used    Tobacco comment: Off and on smoker pt states.    Vaping Use    Vaping Use: Never used   Substance Use Topics    Alcohol use: No    Drug use: No   ,   Family History   Problem Relation Age of Onset    Heart Disease Mother     Diabetes Mother     Cancer Father     Diabetes Brother    ,   Immunization History   Administered Date(s) Administered    Influenza Virus Vaccine 2012, 2013, 10/09/2014, 10/13/2014    Influenza, High Dose (Fluzone 65 yrs and older) 2016, 10/03/2017, 10/17/2018    Influenza, Triv, inactivated, subunit, adjuvanted, IM (Fluad 65 yrs and older) 10/24/2019    Pneumococcal Conjugate 13-valent ([id number]) 10/13/2017    Pneumococcal Polysaccharide ([id number]) 2019    Tdap (Boostrix, Adacel) 2017   ,   Health Maintenance   Topic Date Due    Hepatitis C screen  Never done    COVID-19 Vaccine (1) Never done    Shingles Vaccine (1 of 2) Never done    DEXA (modify frequency per FRAX score)  Never done    Low dose CT lung screening  2021    Flu vaccine (1) 09/01/2021    Annual Wellness Visit (AWV)  10/30/2021    Potassium monitoring  05/26/2022    Creatinine monitoring  05/26/2022    DTaP/Tdap/Td vaccine (2 - Td or Tdap) 08/07/2027    Pneumococcal 65+ years Vaccine  Completed    Hepatitis A vaccine  Aged Out    Hepatitis B vaccine  Aged Out    Hib vaccine  Aged Out    Meningococcal (ACWY) vaccine  Aged Out       PHYSICAL EXAM:  Physical Exam  Constitutional:       Appearance: Normal appearance. HENT:      Head: Normocephalic. Right Ear: Tympanic membrane, ear canal and external ear normal.      Left Ear: Tympanic membrane, ear canal and external ear normal.      Nose:      Right Sinus: Maxillary sinus tenderness and frontal sinus tenderness present. Left Sinus: Maxillary sinus tenderness and frontal sinus tenderness present. Mouth/Throat:      Lips: Pink. Mouth: Mucous membranes are moist.      Pharynx: Oropharynx is clear. Cardiovascular:      Rate and Rhythm: Normal rate and regular rhythm. Heart sounds: Normal heart sounds. Pulmonary:      Effort: Pulmonary effort is normal.      Breath sounds: Wheezing (scattered) present. Musculoskeletal:      Cervical back: Neck supple. Skin:     General: Skin is warm and dry. Findings: Wound present. Comments: Small scabbed area on left hand from a cat bite - very little redness and no swelling - No drainage. Neurological:      Mental Status: She is alert and oriented to person, place, and time. Psychiatric:         Mood and Affect: Mood normal.         Behavior: Behavior normal.         ASSESSMENT/PLAN:  1. Bronchitis  Continue Mucinex OTC.  Encourage clear fluids without caffeine  - Smaller, more frequent meals to ensure hydration.   - Saline nasal spray, cool mist humidifier, prop head at night for congestion.   - May use spoonfuls of honey to coat throat.    - Tylenol as needed for fever, pain.    - Counseled on signs of increased work of breathing.   - RTO if sxs increase or no improvement  - methylPREDNISolone (MEDROL, APARNA,) 4 MG tablet; Take by mouth. Dispense: 1 kit; Refill: 0  - Covid-19 Ambulatory    2. Acute non-recurrent pansinusitis  - methylPREDNISolone (MEDROL, APARNA,) 4 MG tablet; Take by mouth. Dispense: 1 kit; Refill: 0  - Covid-19 Ambulatory    3. Cat bite, subsequent encounter  Improved. Close to being healed. Very little redness around scabbed area. Advised patient to clean the wound daily with soup and water. Continue the Augmentin until gone. Explained to her the little bit of redness should resolve by the time she is done with antibiotic. If the area becomes worse or the redness does not resolve follow up. FOLLOW-UP:  Return if symptoms worsen or fail to improve.     In addition to other information, the printed after visit summary provided to the patient includes:  [x] COVID-19 Self care instructions  [x] COVID-19 General patient information

## 2021-10-27 NOTE — PATIENT INSTRUCTIONS
Your COVID 19 test can take 1-5 days for the results to come back. We ask that you make a Mychart page and view your test results this way. You will need to Self quarantine until you know your results. Increase fluids and rest  Saline nasal spray as needed for nasal congestion  Warm salt gargles as needed for throat discomfort  Monitor temperature twice a day  Tylenol as needed for fevers and/or discomfort. Big deep breaths periodically throughout the day  Regular Mucinex over the counter as needed for chest congestion  If symptoms worsen -Go to the ER. Follow up with your primary care provider      To Whom it May Concern:    Nima Campos was tested for COVID-19 10/27/2021. He/she must stay home until test results are back. If test is positive, he/she must quarantine for a total of 10 days starting from day one of symptom onset. He/she must also be fever-free for 24 hours at that time, and also have improvement in symptoms. We do not recommend retesting as patients may continue to test positive for months even though no longer contagious. It is suggested you call [address] or  [place] with any questions regarding quarantine timeframe/return to work/school details.

## 2021-10-27 NOTE — PROGRESS NOTES
10/27/21  Rom Samaniego  1945    FLU/COVID-19 CLINIC EVALUATION    HPI SYMPTOMS:    Employer:    [] Fevers  [] Chills  [x] Cough  [] Coughing up blood  [x] Chest Congestion  [x] Nasal Congestion  [] Feeling short of breath  [] Sometimes  [] Frequently  [] All the time  [] Chest pain  [] Headaches  []Tolerable  [] Severe  [] Sore throat  [] Muscle aches  [] Nausea  [] Vomiting  []Unable to keep fluids down  [] Diarrhea  []Severe    [x] OTHER SYMPTOMS: feeling of pressure in head     Symptom Duration:   [] 1  [] 2   [x] 3   [] 4    [] 5   [] 6   [] 7   [] 8   [] 9   [] 10   [] 11   [] 12   [] 13   [] 14   [] Longer than 14 days    Symptom course:   [x] Worsening     [] Stable     [] Improving    RISK FACTORS:    [] Pregnant or possibly pregnant  [x] Age over 61  [] Diabetes  [] Heart disease  [] Asthma  [x] COPD/Other chronic lung diseases  [] Active Cancer  [] On Chemotherapy  [] Taking oral steroids  [] History Lymphoma/Leukemia  [] Close contact with a lab confirmed COVID-19 patient within 14 days of symptom onset  [] History of travel from affected geographical areas within 14 days of symptom onset       VITALS:  There were no vitals filed for this visit. TESTS:    POCT FLU:  [] Positive     []Negative    ASSESSMENT:    [] Flu  [] Possible COVID-19  [] Strep    PLAN:    [] Discharge home with written instructions for:  [] Flu management  [] Possible COVID-19 infection with self-quarantine and management of symptoms  [] Follow-up with primary care physician or emergency department if worsens  [] Evaluation per physician/NP/PA in clinic  [] Sent to ER       An  electronic signature was used to authenticate this note.      --Alphonse Arcenio on 10/27/2021 at 8:41 AM

## 2021-11-01 ENCOUNTER — HOSPITAL ENCOUNTER (EMERGENCY)
Age: 76
Discharge: HOME | End: 2021-11-01
Attending: EMERGENCY MEDICINE
Payer: MEDICARE

## 2021-11-01 ENCOUNTER — APPOINTMENT (OUTPATIENT)
Dept: GENERAL RADIOLOGY | Age: 76
End: 2021-11-01
Payer: MEDICARE

## 2021-11-01 VITALS
WEIGHT: 84.4 LBS | DIASTOLIC BLOOD PRESSURE: 92 MMHG | OXYGEN SATURATION: 94 % | BODY MASS INDEX: 14.95 KG/M2 | HEART RATE: 90 BPM | TEMPERATURE: 97.9 F | RESPIRATION RATE: 19 BRPM | HEIGHT: 63 IN | SYSTOLIC BLOOD PRESSURE: 148 MMHG

## 2021-11-01 DIAGNOSIS — R09.81 SINUS CONGESTION: ICD-10-CM

## 2021-11-01 DIAGNOSIS — J06.9 ACUTE UPPER RESPIRATORY INFECTION: Primary | ICD-10-CM

## 2021-11-01 DIAGNOSIS — J44.1 COPD EXACERBATION (HCC): ICD-10-CM

## 2021-11-01 PROCEDURE — 71045 X-RAY EXAM CHEST 1 VIEW: CPT

## 2021-11-01 PROCEDURE — 6370000000 HC RX 637 (ALT 250 FOR IP): Performed by: EMERGENCY MEDICINE

## 2021-11-01 PROCEDURE — 99284 EMERGENCY DEPT VISIT MOD MDM: CPT

## 2021-11-01 RX ORDER — BENZONATATE 100 MG/1
100 CAPSULE ORAL 3 TIMES DAILY PRN
Qty: 20 CAPSULE | Refills: 0 | Status: SHIPPED | OUTPATIENT
Start: 2021-11-01 | End: 2021-11-08

## 2021-11-01 RX ORDER — ALBUTEROL SULFATE 90 UG/1
4 AEROSOL, METERED RESPIRATORY (INHALATION) ONCE
Status: COMPLETED | OUTPATIENT
Start: 2021-11-01 | End: 2021-11-01

## 2021-11-01 RX ORDER — DOXYCYCLINE HYCLATE 100 MG
100 TABLET ORAL 2 TIMES DAILY
Qty: 14 TABLET | Refills: 0 | Status: SHIPPED | OUTPATIENT
Start: 2021-11-01 | End: 2021-11-08

## 2021-11-01 RX ADMIN — ALBUTEROL SULFATE 4 PUFF: 90 AEROSOL, METERED RESPIRATORY (INHALATION) at 16:42

## 2021-11-01 NOTE — ED PROVIDER NOTES
EMERGENCY DEPARTMENT ENCOUNTER      CHIEF COMPLAINT:   Cough    HPI: Nicole Dutta is a 67 y.o. female, with a history of COPD, who presents to the emergency department for evaluation of a cough and wheezing. The patient states that she has had symptoms for the past week. She tried to see her primary care physician, but they were not seeing patients with Covid symptoms and so she was sent to the clinic. She states she was negative for Covid on 10/27. She was started on a Medrol Dosepak and states that she is continued to have symptoms. He complains of sinus pressure and sinus drainage on the back of her throat. She has had a productive cough. She has been wheezing. Symptoms are intermittent. There are no exacerbating or alleviating factors. She denies fevers, chills, chest pain, abdominal pain, nausea, vomiting or any other complaints. REVIEW OF SYSTEMS:   Constitutional:  Denies fever or chills  Eyes:  Denies change in visual acuity  HENT:  + nasal congestion  Respiratory:  + cough and wheezing  Cardiovascular:  Denies chest pain or edema  GI:  Denies abdominal pain, nausea, vomiting, bloody stools or diarrhea  :  Denies dysuria  Musculoskeletal:  Denies back pain or joint pain  Integument:  Denies rash  Neurologic:  Denies headache, focal weakness or sensory changes  \"Remaining review of systems reviewed and negative. I have reviewed the nursing triage documentation and agree unless otherwise noted below. \"      PAST MEDICAL HISTORY:   Past Medical History:   Diagnosis Date    Former smoker 1/5/2018    Hypertension     Moderate COPD (chronic obstructive pulmonary disease) ([address] 1/5/2018    Other emphysema ([address] 1/5/2018       CURRENT MEDICATIONS:   Home medications reviewed.     SURGICAL HISTORY:   Past Surgical History:   Procedure Laterality Date    ABDOMEN SURGERY      CHOLECYSTECTOMY      HYSTERECTOMY      OTHER SURGICAL HISTORY  07/16/2018    exp lap with TUNG       FAMILY HISTORY: Family History   Problem Relation Age of Onset    Heart Disease Mother     Diabetes Mother     Cancer Father     Diabetes Brother        SOCIAL HISTORY:   Social History     Socioeconomic History    Marital status:      Spouse name: Not on file    Number of children: Not on file    Years of education: Not on file    Highest education level: Not on file   Occupational History    Not on file   Tobacco Use    Smoking status: Current Some Day Smoker     Packs/day: 0.50     Years: 50.00     Pack years: 25.00     Types: Cigarettes     Last attempt to quit: 2018     Years since quittin.9    Smokeless tobacco: Never Used    Tobacco comment: Off and on smoker pt states. Vaping Use    Vaping Use: Never used   Substance and Sexual Activity    Alcohol use: No    Drug use: No    Sexual activity: Not Currently   Other Topics Concern    Not on file   Social History Narrative    Not on file     Social Determinants of Health     Financial Resource Strain: Low Risk     Difficulty of Paying Living Expenses: Not very hard   Food Insecurity: No Food Insecurity    Worried About Running Out of Food in the Last Year: Never true    Allyssa of Food in the Last Year: Never true   Transportation Needs: No Transportation Needs    Lack of Transportation (Medical): No    Lack of Transportation (Non-Medical): No   Physical Activity: Inactive    Days of Exercise per Week: 0 days    Minutes of Exercise per Session: 0 min   Stress: No Stress Concern Present    Feeling of Stress :  Only a little   Social Connections: Socially Isolated    Frequency of Communication with Friends and Family: More than three times a week    Frequency of Social Gatherings with Friends and Family: Twice a week    Attends Gnosticism Services: Never    Active Member of Clubs or Organizations: No    Attends Club or Organization Meetings: Never    Marital Status:    Intimate Partner Violence:     Fear of Current or Ex-Partner:     Emotionally Abused:     Physically Abused:     Sexually Abused: ALLERGIES: Pravastatin sodium    PHYSICAL EXAM:  VITAL SIGNS:   ED Triage Vitals [11/01/21 1623]   Enc Vitals Group      BP (!) 148/92      Pulse 90      Resp 19      Temp 97.9 °F (36.6 °C)      Temp Source Temporal      SpO2 94 %      Weight 84 lb 6.4 oz (38.3 kg)      Height 5' 3\" (1.6 m)      Head Circumference       Peak Flow       Pain Score       Pain Loc       Pain Edu? Excl. in [address]? Constitutional:  Non-toxic appearance  HENT: Normocephalic, Atraumatic, Bilateral external ears normal, Oropharynx moist, No oral exudates, Nose with audible nasal congestion  Eyes:  PERRL, Conjunctiva normal, No discharge. Neck: Normal range of motion, No tenderness, Supple, No stridor, No lymphadenopathy . Cardiovascular:  Normal heart rate, Normal rhythm  Pulmonary/Chest: Diminished breath sounds bilaterally with scattered wheezing, no respiratory distress, frequent cough  Abdomen: Bowel sounds normal, Soft, No tenderness, No masses, No pulsatile masses  Extremities:  Normal range of motion, Intact distal pulses, No edema, No tenderness  Skin:  Warm, Dry, No erythema, No rash      EKG Interpretation  None    Radiology / Procedures:  XR CHEST PORTABLE (Final result)  Result time 11/01/21 17:05:28  Final result by Kenrick Quinn MD (11/01/21 17:05:28)                Impression:    No acute cardiopulmonary abnormality. Narrative:    EXAMINATION:   ONE XRAY VIEW OF THE CHEST     11/1/2021 4:40 pm     COMPARISON:   11/16/2020     HISTORY:   ORDERING SYSTEM PROVIDED HISTORY: Cough   TECHNOLOGIST PROVIDED HISTORY:   Reason for exam:->Cough   Reason for Exam: cough/ breast implants     FINDINGS:   The lungs are clear other than chronic calcified granulomatous changes.  The   cardiac and mediastinal contours are normal.  There is no pleural effusion or   pneumothorax. No acute osseous abnormality is identified.  Mild thoracic   dextroscoliosis is redemonstrated.  Bilateral breast implants are present. ED COURSE & MEDICAL DECISION MAKING:  Pertinent Labs & Imaging studies reviewed. (See chart for details)  On exam, the patient is afebrile and nontoxic appearing. She is mildly hypertensive with a known history of hypertension and is asymptomatic. She is otherwise hemodynamically stable and neurologically intact. Chest x-ray is negative for acute cardiopulmonary abnormality. The patient was treated with albuterol with improvement of wheezing. I suspect that the patient is having a COPD exacerbation in the setting of an acute upper respiratory infection. . I have a low suspicion for peritonsillar abscess, epiglottitis, Harjit's angina, pneumonia, or sepsis. I feel that the patient is stable for outpatient management with follow up in 2-3 days. The patient is given return precautions. The patient verbalized understanding, was agreeable with plan, and the patient was discharged home in stable condition. Clinical Impression:  1. Acute upper respiratory infection    2. COPD exacerbation ([address]    3.  Sinus congestion        Disposition referral (if applicable):  Doctor of your choice    Schedule an appointment as soon as possible for a visit in 3 days      [address]  [phone]  Go to   If symptoms worsen      Disposition medications (if applicable):  Discharge Medication List as of 11/1/2021  5:23 PM      START taking these medications    Details   doxycycline hyclate (VIBRA-TABS) 100 MG tablet Take 1 tablet by mouth 2 times daily for 7 days, Disp-14 tablet, R-0Normal      benzonatate (TESSALON PERLES) 100 MG capsule Take 1 capsule by mouth 3 times daily as needed for Cough, Disp-20 capsule, R-0Normal               Comment: Please note this report has been produced using speech recognition software and may contain errors related to that system including errors in grammar, punctuation, and spelling, as well as words and phrases that may be inappropriate. If there are any questions or concerns please feel free to contact the dictating provider for clarification.               Dipika Still MD  11/01/21 3167

## 2021-11-01 NOTE — ED TRIAGE NOTES
Pt from home cough sneezing for 1 wk tried to go to PCP and sent clinic. Neg covid test. Started on Z balwinder, no improvement. And mucinex without relief.

## 2021-11-03 ASSESSMENT — ENCOUNTER SYMPTOMS
SINUS PAIN: 0
COUGH: 0
VOMITING: 0
SINUS PRESSURE: 0
RHINORRHEA: 0
SORE THROAT: 0
WHEEZING: 0
CHEST TIGHTNESS: 0
DIARRHEA: 0
SHORTNESS OF BREATH: 0
NAUSEA: 0

## 2021-11-10 DIAGNOSIS — J44.9 CHRONIC OBSTRUCTIVE PULMONARY DISEASE, UNSPECIFIED COPD TYPE (HCC): ICD-10-CM

## 2021-11-10 RX ORDER — BUDESONIDE AND FORMOTEROL FUMARATE DIHYDRATE 160; 4.5 UG/1; UG/1
AEROSOL RESPIRATORY (INHALATION)
Qty: 10.2 EACH | Refills: 6 | Status: SHIPPED | OUTPATIENT
Start: 2021-11-10 | End: 2021-12-01

## 2021-11-23 ENCOUNTER — APPOINTMENT (OUTPATIENT)
Dept: GENERAL RADIOLOGY | Age: 76
End: 2021-11-23
Payer: MEDICARE

## 2021-11-23 ENCOUNTER — HOSPITAL ENCOUNTER (EMERGENCY)
Age: 76
Discharge: HOME | End: 2021-11-23
Attending: EMERGENCY MEDICINE
Payer: MEDICARE

## 2021-11-23 VITALS
OXYGEN SATURATION: 90 % | SYSTOLIC BLOOD PRESSURE: 118 MMHG | DIASTOLIC BLOOD PRESSURE: 78 MMHG | WEIGHT: 84.4 LBS | HEIGHT: 63 IN | RESPIRATION RATE: 20 BRPM | HEART RATE: 99 BPM | TEMPERATURE: 98 F | BODY MASS INDEX: 14.95 KG/M2

## 2021-11-23 DIAGNOSIS — J44.1 COPD EXACERBATION (HCC): Primary | ICD-10-CM

## 2021-11-23 DIAGNOSIS — J06.9 VIRAL URI: ICD-10-CM

## 2021-11-23 LAB
ALBUMIN SERPL-MCNC: 3.9 GM/DL (ref 3.4–5)
ALP BLD-CCNC: 115 IU/L (ref 40–129)
ALT SERPL-CCNC: 26 U/L (ref 10–40)
ANION GAP SERPL CALCULATED.3IONS-SCNC: 10 MMOL/L (ref 4–16)
AST SERPL-CCNC: 33 IU/L (ref 15–37)
BASOPHILS ABSOLUTE: 0 K/CU MM
BASOPHILS RELATIVE PERCENT: 0.3 % (ref 0–1)
BILIRUB SERPL-MCNC: 0.7 MG/DL (ref 0–1)
BUN BLDV-MCNC: 14 MG/DL (ref 6–23)
CALCIUM SERPL-MCNC: 9.4 MG/DL (ref 8.3–10.6)
CHLORIDE BLD-SCNC: 96 MMOL/L (ref 99–110)
CO2: 30 MMOL/L (ref 21–32)
CREAT SERPL-MCNC: 0.5 MG/DL (ref 0.6–1.1)
DIFFERENTIAL TYPE: ABNORMAL
EOSINOPHILS ABSOLUTE: 0.1 K/CU MM
EOSINOPHILS RELATIVE PERCENT: 0.4 % (ref 0–3)
GFR AFRICAN AMERICAN: >60 ML/MIN/1.73M2
GFR NON-AFRICAN AMERICAN: >60 ML/MIN/1.73M2
GLUCOSE BLD-MCNC: 99 MG/DL (ref 70–99)
HCT VFR BLD CALC: 45.2 % (ref 37–47)
HEMOGLOBIN: 14.4 GM/DL (ref 12.5–16)
IMMATURE NEUTROPHIL %: 0.3 % (ref 0–0.43)
LYMPHOCYTES ABSOLUTE: 1.8 K/CU MM
LYMPHOCYTES RELATIVE PERCENT: 12.7 % (ref 24–44)
MCH RBC QN AUTO: 29.3 PG (ref 27–31)
MCHC RBC AUTO-ENTMCNC: 31.9 % (ref 32–36)
MCV RBC AUTO: 91.9 FL (ref 78–100)
MONOCYTES ABSOLUTE: 1.4 K/CU MM
MONOCYTES RELATIVE PERCENT: 9.4 % (ref 0–4)
PDW BLD-RTO: 13.8 % (ref 11.7–14.9)
PLATELET # BLD: 244 K/CU MM (ref 140–440)
PMV BLD AUTO: 10.2 FL (ref 7.5–11.1)
POTASSIUM SERPL-SCNC: 4.1 MMOL/L (ref 3.5–5.1)
PRO-BNP: 292 PG/ML
RBC # BLD: 4.92 M/CU MM (ref 4.2–5.4)
SARS-COV-2, NAAT: NOT DETECTED
SEGMENTED NEUTROPHILS ABSOLUTE COUNT: 11 K/CU MM
SEGMENTED NEUTROPHILS RELATIVE PERCENT: 76.9 % (ref 36–66)
SODIUM BLD-SCNC: 136 MMOL/L (ref 135–145)
SOURCE: NORMAL
TOTAL IMMATURE NEUTOROPHIL: 0.05 K/CU MM
TOTAL PROTEIN: 6.6 GM/DL (ref 6.4–8.2)
WBC # BLD: 14.4 K/CU MM (ref 4–10.5)

## 2021-11-23 PROCEDURE — 71045 X-RAY EXAM CHEST 1 VIEW: CPT

## 2021-11-23 PROCEDURE — 99285 EMERGENCY DEPT VISIT HI MDM: CPT

## 2021-11-23 PROCEDURE — 83880 ASSAY OF NATRIURETIC PEPTIDE: CPT

## 2021-11-23 PROCEDURE — 6360000002 HC RX W HCPCS: Performed by: EMERGENCY MEDICINE

## 2021-11-23 PROCEDURE — 6370000000 HC RX 637 (ALT 250 FOR IP): Performed by: EMERGENCY MEDICINE

## 2021-11-23 PROCEDURE — 80053 COMPREHEN METABOLIC PANEL: CPT

## 2021-11-23 PROCEDURE — 87635 SARS-COV-2 COVID-19 AMP PRB: CPT

## 2021-11-23 PROCEDURE — 85025 COMPLETE CBC W/AUTO DIFF WBC: CPT

## 2021-11-23 RX ORDER — ALBUTEROL SULFATE 90 UG/1
2 AEROSOL, METERED RESPIRATORY (INHALATION) 4 TIMES DAILY PRN
Qty: 18 G | Refills: 0 | Status: SHIPPED | OUTPATIENT
Start: 2021-11-23

## 2021-11-23 RX ORDER — ALBUTEROL SULFATE 2.5 MG/3ML
2.5 SOLUTION RESPIRATORY (INHALATION) ONCE
Status: COMPLETED | OUTPATIENT
Start: 2021-11-23 | End: 2021-11-23

## 2021-11-23 RX ORDER — PREDNISONE 20 MG/1
40 TABLET ORAL 2 TIMES DAILY
Qty: 20 TABLET | Refills: 0 | Status: SHIPPED | OUTPATIENT
Start: 2021-11-23 | End: 2021-11-28

## 2021-11-23 RX ORDER — GUAIFENESIN 600 MG/1
600 TABLET, EXTENDED RELEASE ORAL 2 TIMES DAILY
Qty: 30 TABLET | Refills: 0 | Status: SHIPPED | OUTPATIENT
Start: 2021-11-23 | End: 2021-12-08

## 2021-11-23 RX ORDER — IPRATROPIUM BROMIDE AND ALBUTEROL SULFATE 2.5; .5 MG/3ML; MG/3ML
1 SOLUTION RESPIRATORY (INHALATION)
Status: DISCONTINUED | OUTPATIENT
Start: 2021-11-24 | End: 2021-11-23

## 2021-11-23 RX ORDER — IPRATROPIUM BROMIDE AND ALBUTEROL SULFATE 2.5; .5 MG/3ML; MG/3ML
1 SOLUTION RESPIRATORY (INHALATION) ONCE
Status: COMPLETED | OUTPATIENT
Start: 2021-11-23 | End: 2021-11-23

## 2021-11-23 RX ORDER — ALBUTEROL SULFATE 90 UG/1
2 AEROSOL, METERED RESPIRATORY (INHALATION) ONCE
Status: COMPLETED | OUTPATIENT
Start: 2021-11-23 | End: 2021-11-23

## 2021-11-23 RX ADMIN — PREDNISONE 30 MG: 20 TABLET ORAL at 19:16

## 2021-11-23 RX ADMIN — ALBUTEROL SULFATE 2 PUFF: 90 AEROSOL, METERED RESPIRATORY (INHALATION) at 19:18

## 2021-11-23 RX ADMIN — ALBUTEROL SULFATE 2.5 MG: 2.5 SOLUTION RESPIRATORY (INHALATION) at 20:37

## 2021-11-23 RX ADMIN — IPRATROPIUM BROMIDE AND ALBUTEROL SULFATE 1 AMPULE: .5; 3 SOLUTION RESPIRATORY (INHALATION) at 20:37

## 2021-11-23 NOTE — ED PROVIDER NOTES
Emergency Department Encounter    Patient: Jerrica Mckeon  MRN: [id number]  : 1945  Date of Evaluation: 2021  ED Provider:  Tiara Robbins MD    Briefly, Jerrica Mckeon is a 67 y.o. female presented to the emergency department with SOB, cough, URI symptoms. Signed out by Dr. Celeste Miramontes, s/s of COPD exacerbation. Eval for PNA, awaiting labs.     I have reviewed and interpreted all of the currently available lab results from this visit (if applicable)  Results for orders placed or performed during the hospital encounter of 21   COVID-19, Rapid    Specimen: Nasopharyngeal   Result Value Ref Range    Source THROAT     SARS-CoV-2, NAAT NOT DETECTED NOT DETECTED   CBC Auto Differential   Result Value Ref Range    WBC 14.4 (H) 4.0 - 10.5 K/CU MM    RBC 4.92 4.2 - 5.4 M/CU MM    Hemoglobin 14.4 12.5 - 16.0 GM/DL    Hematocrit 45.2 37 - 47 %    MCV 91.9 78 - 100 FL    MCH 29.3 27 - 31 PG    MCHC 31.9 (L) 32.0 - 36.0 %    RDW 13.8 11.7 - 14.9 %    Platelets 821 566 - 003 K/CU MM    MPV 10.2 7.5 - 11.1 FL    Differential Type AUTOMATED DIFFERENTIAL     Segs Relative 76.9 (H) 36 - 66 %    Lymphocytes % 12.7 (L) 24 - 44 %    Monocytes % 9.4 (H) 0 - 4 %    Eosinophils % 0.4 0 - 3 %    Basophils % 0.3 0 - 1 %    Segs Absolute 11.0 K/CU MM    Lymphocytes Absolute 1.8 K/CU MM    Monocytes Absolute 1.4 K/CU MM    Eosinophils Absolute 0.1 K/CU MM    Basophils Absolute 0.0 K/CU MM    Immature Neutrophil % 0.3 0 - 0.43 %    Total Immature Neutrophil 0.05 K/CU MM   CMP   Result Value Ref Range    Sodium 136 135 - 145 MMOL/L    Potassium 4.1 3.5 - 5.1 MMOL/L    Chloride 96 (L) 99 - 110 mMol/L    CO2 30 21 - 32 MMOL/L    BUN 14 6 - 23 MG/DL    CREATININE 0.5 (L) 0.6 - 1.1 MG/DL    Glucose 99 70 - 99 MG/DL    Calcium 9.4 8.3 - 10.6 MG/DL    Albumin 3.9 3.4 - 5.0 GM/DL    Total Protein 6.6 6.4 - 8.2 GM/DL    Total Bilirubin 0.7 0.0 - 1.0 MG/DL    ALT 26 10 - 40 U/L    AST 33 15 - 37 IU/L    Alkaline Phosphatase 115 40 - 129 IU/L    GFR Non-African American >60 >60 mL/min/1.73m2    GFR African American >60 >60 mL/min/1.73m2    Anion Gap 10 4 - 16   Brain Natriuretic Peptide   Result Value Ref Range    Pro-.0 <300 PG/ML      Radiographs (if obtained):    [] Radiologist's Report Reviewed:  XR CHEST PORTABLE   Final Result   1. No evident acute cardiopulmonary process with evaluation partially limited   by artifact from overlying breast implants. 2. Emphysema better demonstrated on CT.             MDM:  At time of reevaluation, the patient was resting on 2 L nasal cannula. I discussed with her that use of supplemental oxygen necessitates hospitalization. She is adamant that she does not wish to stay in the hospital.  We were able to remove her oxygen and she was 88%, we were aggressive with respiratory protocol at bedside, providing incentive spirometry, DuoNeb with albuterol and increased recruitment with patient having some coughing. Her chest x-ray is reassuring, I did discuss her slight leukocytosis, she recently completed doxycycline and steroids, it may be steroid-induced elevation. She will require follow-up with primary care. As she is unwilling to stay in the hospital, we were specific about the need for aggressive respiratory protocol. She is not having increased rate or work of breathing, she actually appears comfortable and is now maintaining her oxygen saturation after breathing treatment in the department, showing clinical improvement. She is encouraged to return anytime with worsening, is agreeable to follow with primary care for recheck. She will be restarted on steroid burst, Mucinex and will be provided a spacer and incentive spirometer to encourage recruitment. Clinical Impression:  1. COPD exacerbation ([address]    2.  Viral URI      Disposition referral (if applicable):  Allie De Jesus MD  [address]  [address] [facility]   [phone]    Call today      [facility] [address]  [phone]    If symptoms worsen    Disposition medications (if applicable):  New Prescriptions    ALBUTEROL SULFATE HFA (VENTOLIN HFA) 108 (90 BASE) MCG/ACT INHALER    Inhale 2 puffs into the lungs 4 times daily as needed for Wheezing    GUAIFENESIN (MUCINEX) 600 MG EXTENDED RELEASE TABLET    Take 1 tablet by mouth 2 times daily for 15 days    PREDNISONE (DELTASONE) 20 MG TABLET    Take 2 tablets by mouth 2 times daily for 5 days       Comment: Please note this report has been produced using speech recognition software and may contain errors related to that system including errors in grammar, punctuation, and spelling, as well as words and phrases that may be inappropriate. Efforts were made to edit the dictations.         Paolo Jimenez MD  11/23/21 2052

## 2021-11-23 NOTE — ED PROVIDER NOTES
eMERGENCY dEPARTMENT eNCOUnter        PCP: Donn Hester MD    CHIEF COMPLAINT    Chief Complaint   Patient presents with    URI     Pt reports having continued symptoms of URI since the last time she was seen in this ED. HPI      Kimberli Schultz is a 67 y.o. female who presents with shortness of breath, cough and fever. She states she was sick recent with URI, so thought she was getting better, but then it came back. States she woke up at about 3:00 this morning with fever and chills. States her temperature was 102 at home, she took some medicine that came down to 100.4. States she has been coughing up a thin yellow mucus. States she has had some nasal congestion. She does have history of COPD, does not wear oxygen at baseline. No leg pain or swelling. No new chest pain. No sick contacts. She did not receive Covid vaccine. REVIEW OF SYSTEMS    Constitutional:  + fever, chills.   HENT:  Denies sore throat or ear pain   Cardiovascular:  Denies Chest Pain, Denies palpitations  Respiratory:   + cough, shortness of breath   GI:  Denies abdominal pain, nausea, vomiting, or diarrhea  :  Denies any urinary symptoms  Musculoskeletal:  Denies back pain, no extremity swelling  Skin:  Denies rash  Neurologic:  Denies lightheadedness, dizziness, headache, focal weakness or sensory changes   Endocrine:  Denies polyuria or polydypsia   Lymphatic:  Denies edema    All other review of systems are negative  See HPI and nursing notes for additional information         PAST MEDICAL & SURGICAL HISTORY    Past Medical History:   Diagnosis Date    Former smoker 1/5/2018    Hypertension     Moderate COPD (chronic obstructive pulmonary disease) ([address] 1/5/2018    Other emphysema ([facility] [address] 1/5/2018     Past Surgical History:   Procedure Laterality Date    ABDOMEN SURGERY      CHOLECYSTECTOMY      HYSTERECTOMY      OTHER SURGICAL HISTORY  07/16/2018    exp lap with TUNG       CURRENT MEDICATIONS    Current Outpatient Rx   Medication Sig Dispense Refill    Fluticasone furoate-vilanterol (BREO ELLIPTA) 200-25 MCG/INH AEPB inhaler Inhale 1 puff into the lungs daily 1 each 5    SYMBICORT 160-4.5 MCG/ACT AERO TAKE 2 PUFFS BY MOUTH TWICE A DAY 10.2 each 6    VITAMIN A PO Take by mouth      Ascorbic Acid (VITAMIN C ADULT GUMMIES PO) Take by mouth      VITAMIN D PO Take by mouth      clonazePAM (KLONOPIN) 1 MG tablet TAKE 1 TABLET TWICE DAILY  AS NEEDED FOR ANXIETY 60 tablet 3    tiZANidine (ZANAFLEX) 4 MG tablet TAKE 1 TABLET BY MOUTH EVERY DAY AT NIGHT 30 tablet 5    albuterol sulfate  (90 Base) MCG/ACT inhaler USE 2 INHALATIONS ORALLY INTO THE LUNGS EVERY 6 HOURS AS NEEDED FOR WHEEZING OR SHORTNESS OF BREATH 54 g 3    montelukast (SINGULAIR) 10 MG tablet TAKE 1 TABLET NIGHTLY 90 tablet 3    omeprazole (PRILOSEC) 20 MG delayed release capsule Take 1 capsule by mouth every morning (before breakfast) 90 capsule 3    traZODone (DESYREL) 50 MG tablet TAKE 1 TABLET NIGHTLY 90 tablet 3    sodium chloride (OCEAN, BABY AYR) 0.65 % nasal spray 1 spray by Nasal route as needed for Congestion      melatonin 5 MG TABS tablet Take 5 mg by mouth nightly      aspirin 81 MG tablet Take 81 mg by mouth daily. ALLERGIES    Allergies   Allergen Reactions    Pravastatin Sodium Other (See Comments)     Myalgias (note from 5/15/18)       SOCIAL & FAMILY HISTORY    Social History     Socioeconomic History    Marital status:      Spouse name: None    Number of children: None    Years of education: None    Highest education level: None   Occupational History    None   Tobacco Use    Smoking status: Current Some Day Smoker     Packs/day: 0.50     Years: 50.00     Pack years: 25.00     Types: Cigarettes     Last attempt to quit: 11/19/2018     Years since quitting: 3.0    Smokeless tobacco: Never Used    Tobacco comment: Off and on smoker pt states.    Vaping Use    Vaping Use: Never used   Substance and Sexual Activity    Alcohol use: No    Drug use: No    Sexual activity: Not Currently   Other Topics Concern    None   Social History Narrative    None     Social Determinants of Health     Financial Resource Strain:     Difficulty of Paying Living Expenses: Not on file   Food Insecurity:     Worried About Running Out of Food in the Last Year: Not on file    Allyssa of Food in the Last Year: Not on file   Transportation Needs:     Lack of Transportation (Medical): Not on file    Lack of Transportation (Non-Medical): Not on file   Physical Activity:     Days of Exercise per Week: Not on file    Minutes of Exercise per Session: Not on file   Stress:     Feeling of Stress : Not on file   Social Connections:     Frequency of Communication with Friends and Family: Not on file    Frequency of Social Gatherings with Friends and Family: Not on file    Attends Jew Services: Not on file    Active Member of [address] [place] [address] [employer] or Organizations: Not on file    Attends Club or Organization Meetings: Not on file    Marital Status: Not on file   Intimate Partner Violence:     Fear of Current or Ex-Partner: Not on file    Emotionally Abused: Not on file    Physically Abused: Not on file    Sexually Abused: Not on file   Housing Stability:     Unable to Pay for Housing in the Last Year: Not on file    Number of [place] in the Last Year: Not on file    Unstable Housing in the Last Year: Not on file     Family History   Problem Relation Age of Onset    Heart Disease Mother     Diabetes Mother     Cancer Father     Diabetes Brother        PHYSICAL EXAM    VITAL SIGNS: BP (!) 117/90   Pulse 98   Temp 98 °F (36.7 °C) (Oral)   Resp 20   Ht 5' 3\" (1.6 m)   Wt 84 lb 6.4 oz (38.3 kg)   SpO2 (!) 89%   BMI 14.95 kg/m²    Constitutional:  Well developed, well nourished, no acute distress. Cachectic appearing.   HENT:  Atraumatic, moist mucus membranes  Neck/Lymphatics: supple, no JVD  Respiratory: Nonlabored respirations, coarse rhonchi noted  Cardiovascular:  Rate regular, rhythm regular  GI:  Soft, nontender, normal bowel sounds  Musculoskeletal:  No edema, no deformities  Integument:  Skin warm and dry, no rash  Neurologic:  Alert & oriented, normal speech  Psych: Normal affect            ED COURSE & MEDICAL DECISION MAKING    Pertinent Labs & Imaging studies reviewed and interpreted. (See chart for details)     Vital signs and nursing notes reviewed during ED course. All pertinent Lab data and radiographic results reviewed with patient at bedside. The patient and/or the family were informed of the results of any tests/labs/imaging, the treatment plan, and time was allotted to answer questions. This is a 74-year-old female who presented with shortness of breath, cough. She has severe COPD. She does not typically wear oxygen at baseline. She was mildly hypoxic, requiring 2 L by nasal cannula. Work-up was initiated including chest x-ray, labs. Albuterol be given MDI due to COVID test is ordered, prednisone ordered. She is awaiting further workup thus will be signed out to the oncoming provider pending workup.     Clinical  IMPRESSION    Shortness of breath, cough, hypoxia           Teressa Heimlich, DO  11/23/21 1844

## 2021-11-23 NOTE — ED TRIAGE NOTES
Pt to the ED for c/o continued URI symptoms for the past couple weeks since her last visit to this ED. Pt reports having no relief of symptoms with previous medications prescribed. Upon triage pt SpO2 was 86-89% on room air and pt was placed on 2L O2 via NC. Pt SpO2 now 94-95%. Pt is A&O x4 and denies any pain at this time.

## 2021-12-01 ENCOUNTER — APPOINTMENT (OUTPATIENT)
Dept: CT IMAGING | Age: 76
DRG: 193 | End: 2021-12-01
Payer: MEDICARE

## 2021-12-01 ENCOUNTER — HOSPITAL ENCOUNTER (INPATIENT)
Age: 76
LOS: 5 days | Discharge: HOME | DRG: 193 | End: 2021-12-06
Attending: EMERGENCY MEDICINE
Payer: MEDICARE

## 2021-12-01 DIAGNOSIS — J18.9 PNEUMONIA OF BOTH LUNGS DUE TO INFECTIOUS ORGANISM, UNSPECIFIED PART OF LUNG: ICD-10-CM

## 2021-12-01 DIAGNOSIS — R91.8 LUNG MASS: ICD-10-CM

## 2021-12-01 DIAGNOSIS — J44.1 COPD EXACERBATION (HCC): ICD-10-CM

## 2021-12-01 DIAGNOSIS — J18.9 MULTIFOCAL PNEUMONIA: Primary | ICD-10-CM

## 2021-12-01 DIAGNOSIS — J44.9 CHRONIC OBSTRUCTIVE PULMONARY DISEASE, UNSPECIFIED COPD TYPE (HCC): ICD-10-CM

## 2021-12-01 DIAGNOSIS — R09.02 HYPOXIA: ICD-10-CM

## 2021-12-01 DIAGNOSIS — D72.829 LEUKOCYTOSIS, UNSPECIFIED TYPE: ICD-10-CM

## 2021-12-01 LAB
ALBUMIN SERPL-MCNC: 3.4 GM/DL (ref 3.4–5)
ALP BLD-CCNC: 118 IU/L (ref 40–129)
ALT SERPL-CCNC: 26 U/L (ref 10–40)
ANION GAP SERPL CALCULATED.3IONS-SCNC: 8 MMOL/L (ref 4–16)
AST SERPL-CCNC: 19 IU/L (ref 15–37)
BASE EXCESS MIXED: 9.2 (ref 0–2)
BASE EXCESS: ABNORMAL (ref 0–2)
BASOPHILS ABSOLUTE: 0 K/CU MM
BASOPHILS RELATIVE PERCENT: 0.2 % (ref 0–1)
BILIRUB SERPL-MCNC: 0.5 MG/DL (ref 0–1)
BUN BLDV-MCNC: 14 MG/DL (ref 6–23)
CALCIUM SERPL-MCNC: 9.3 MG/DL (ref 8.3–10.6)
CHLORIDE BLD-SCNC: 96 MMOL/L (ref 99–110)
CO2: 34 MMOL/L (ref 21–32)
CREAT SERPL-MCNC: 0.5 MG/DL (ref 0.6–1.1)
DIFFERENTIAL TYPE: ABNORMAL
EOSINOPHILS ABSOLUTE: 0.1 K/CU MM
EOSINOPHILS RELATIVE PERCENT: 0.8 % (ref 0–3)
GFR AFRICAN AMERICAN: >60 ML/MIN/1.73M2
GFR NON-AFRICAN AMERICAN: >60 ML/MIN/1.73M2
GLUCOSE BLD-MCNC: 99 MG/DL (ref 70–99)
HCO3 VENOUS: 36.5 MMOL/L (ref 19–25)
HCT VFR BLD CALC: 44.5 % (ref 37–47)
HEMOGLOBIN: 14 GM/DL (ref 12.5–16)
IMMATURE NEUTROPHIL %: 1.7 % (ref 0–0.43)
LYMPHOCYTES ABSOLUTE: 2.2 K/CU MM
LYMPHOCYTES RELATIVE PERCENT: 16.2 % (ref 24–44)
MCH RBC QN AUTO: 29 PG (ref 27–31)
MCHC RBC AUTO-ENTMCNC: 31.5 % (ref 32–36)
MCV RBC AUTO: 92.3 FL (ref 78–100)
MONOCYTES ABSOLUTE: 1.4 K/CU MM
MONOCYTES RELATIVE PERCENT: 9.9 % (ref 0–4)
O2 SAT, VEN: 91.1 % (ref 50–70)
PCO2, VEN: 57.7 MMHG (ref 38–52)
PDW BLD-RTO: 14.4 % (ref 11.7–14.9)
PH VENOUS: 7.41 (ref 7.32–7.42)
PLATELET # BLD: 268 K/CU MM (ref 140–440)
PMV BLD AUTO: 10.5 FL (ref 7.5–11.1)
PO2, VEN: 62.9 MMHG (ref 28–48)
POTASSIUM SERPL-SCNC: 4 MMOL/L (ref 3.5–5.1)
PRO-BNP: 153.3 PG/ML
RBC # BLD: 4.82 M/CU MM (ref 4.2–5.4)
SARS-COV-2, NAAT: NOT DETECTED
SEGMENTED NEUTROPHILS ABSOLUTE COUNT: 9.7 K/CU MM
SEGMENTED NEUTROPHILS RELATIVE PERCENT: 71.2 % (ref 36–66)
SODIUM BLD-SCNC: 138 MMOL/L (ref 135–145)
SOURCE: NORMAL
TOTAL IMMATURE NEUTOROPHIL: 0.23 K/CU MM
TOTAL PROTEIN: 6.1 GM/DL (ref 6.4–8.2)
TROPONIN T: <0.01 NG/ML
WBC # BLD: 13.6 K/CU MM (ref 4–10.5)

## 2021-12-01 PROCEDURE — 6360000002 HC RX W HCPCS: Performed by: EMERGENCY MEDICINE

## 2021-12-01 PROCEDURE — 93005 ELECTROCARDIOGRAM TRACING: CPT | Performed by: EMERGENCY MEDICINE

## 2021-12-01 PROCEDURE — 2580000003 HC RX 258: Performed by: EMERGENCY MEDICINE

## 2021-12-01 PROCEDURE — 87635 SARS-COV-2 COVID-19 AMP PRB: CPT

## 2021-12-01 PROCEDURE — 85025 COMPLETE CBC W/AUTO DIFF WBC: CPT

## 2021-12-01 PROCEDURE — 6360000004 HC RX CONTRAST MEDICATION: Performed by: EMERGENCY MEDICINE

## 2021-12-01 PROCEDURE — 80053 COMPREHEN METABOLIC PANEL: CPT

## 2021-12-01 PROCEDURE — 1200000000 HC SEMI PRIVATE

## 2021-12-01 PROCEDURE — 6370000000 HC RX 637 (ALT 250 FOR IP): Performed by: EMERGENCY MEDICINE

## 2021-12-01 PROCEDURE — 96374 THER/PROPH/DIAG INJ IV PUSH: CPT

## 2021-12-01 PROCEDURE — 84484 ASSAY OF TROPONIN QUANT: CPT

## 2021-12-01 PROCEDURE — 83880 ASSAY OF NATRIURETIC PEPTIDE: CPT

## 2021-12-01 PROCEDURE — 87040 BLOOD CULTURE FOR BACTERIA: CPT

## 2021-12-01 PROCEDURE — 82805 BLOOD GASES W/O2 SATURATION: CPT

## 2021-12-01 PROCEDURE — 71275 CT ANGIOGRAPHY CHEST: CPT

## 2021-12-01 PROCEDURE — 99284 EMERGENCY DEPT VISIT MOD MDM: CPT

## 2021-12-01 RX ORDER — BENZONATATE 100 MG/1
100 CAPSULE ORAL ONCE
Status: COMPLETED | OUTPATIENT
Start: 2021-12-01 | End: 2021-12-01

## 2021-12-01 RX ORDER — METHYLPREDNISOLONE SODIUM SUCCINATE 125 MG/2ML
125 INJECTION, POWDER, LYOPHILIZED, FOR SOLUTION INTRAMUSCULAR; INTRAVENOUS ONCE
Status: COMPLETED | OUTPATIENT
Start: 2021-12-01 | End: 2021-12-01

## 2021-12-01 RX ORDER — GUAIFENESIN 100 MG/5ML
200 SOLUTION ORAL ONCE
Status: COMPLETED | OUTPATIENT
Start: 2021-12-01 | End: 2021-12-01

## 2021-12-01 RX ORDER — ALBUTEROL SULFATE 90 UG/1
4 AEROSOL, METERED RESPIRATORY (INHALATION) ONCE
Status: COMPLETED | OUTPATIENT
Start: 2021-12-01 | End: 2021-12-01

## 2021-12-01 RX ORDER — 0.9 % SODIUM CHLORIDE 0.9 %
20 VIAL (ML) INJECTION
Status: COMPLETED | OUTPATIENT
Start: 2021-12-01 | End: 2021-12-01

## 2021-12-01 RX ADMIN — GUAIFENESIN 200 MG: 200 SOLUTION ORAL at 16:04

## 2021-12-01 RX ADMIN — METHYLPREDNISOLONE SODIUM SUCCINATE 125 MG: 125 INJECTION, POWDER, FOR SOLUTION INTRAMUSCULAR; INTRAVENOUS at 16:04

## 2021-12-01 RX ADMIN — CEFTRIAXONE SODIUM 1000 MG: 1 INJECTION, POWDER, FOR SOLUTION INTRAMUSCULAR; INTRAVENOUS at 17:14

## 2021-12-01 RX ADMIN — BENZONATATE 100 MG: 100 CAPSULE ORAL at 16:04

## 2021-12-01 RX ADMIN — IOPAMIDOL 55 ML: 755 INJECTION, SOLUTION INTRAVENOUS at 15:27

## 2021-12-01 RX ADMIN — Medication 20 ML: at 15:27

## 2021-12-01 RX ADMIN — ALBUTEROL SULFATE 4 PUFF: 90 AEROSOL, METERED RESPIRATORY (INHALATION) at 16:04

## 2021-12-01 RX ADMIN — AZITHROMYCIN MONOHYDRATE 500 MG: 500 INJECTION, POWDER, LYOPHILIZED, FOR SOLUTION INTRAVENOUS at 17:53

## 2021-12-01 NOTE — ED PROVIDER NOTES
EMERGENCY DEPARTMENT ENCOUNTER      CHIEF COMPLAINT:   Cough  Shortness of breath    CRITICAL CARE NOTE:  There was a high probability of clinically significant life-threatening deterioration of the patient's condition requiring my urgent intervention. Total critical care time is 40 minutes  This includes vital sign monitoring, pulse oximetry monitoring, telemetry monitoring, clinical response to the IV medications, reviewing the nursing notes, consultation time, dictation/documetation time. (This time excludes time spent performing procedures). HPI: Jerrica Mckeon is a 67 y.o. female with a past medical history of COPD who presents to the Emergency Department complaining of a cough and shortness of breath. The patient states that the symptoms started several weeks ago. She has been seen in this emergency department twice and received steroids and antibiotics. She states that her symptoms seem to improve with the antibiotics, but then come back when she finishes them. Over the past several days, she has become increasingly short of breath. The shortness of breath is constant. The cough is intermittent and productive for yellow sputum. She denies chest pain or hemoptysis. There are no exacerbating or relieving factors. The patient denies current tobacco use. She does not use oxygen at home. There is no known history of DVT or PE. The patient denies leg pain or leg swelling. The patient denies fevers, chills, neck pain, chest pain, hemoptysis, abdominal pain, nausea, vomiting, numbness, tingling, weakness, or any other complaints.       REVIEW OF SYSTEMS:  CONSTITUTIONAL:  Denies fever, chills, weight loss or weakness  EYES:  Denies photophobia or discharge  ENT:  Denies sore throat or ear pain  CARDIOVASCULAR: Denies chest pain or palpitations  RESPIRATORY:  Complains of cough and shortness of breath  GI:  Denies abdominal pain, nausea, vomiting, or diarrhea  MUSCULOSKELETAL:  Denies back pain  SKIN: No rash  NEUROLOGIC:  Denies headache, focal weakness or sensory changes  All systems negative except as marked. \"Remaining review of systems reviewed and negative. I have reviewed the nursing triage documentation and agree unless otherwise noted below. \"      PAST MEDICAL HISTORY:   Past Medical History:   Diagnosis Date    Former smoker 1/5/2018    Hypertension     Moderate COPD (chronic obstructive pulmonary disease) ([facility] [address] 1/5/2018    Other emphysema ([facility] [address] 1/5/2018       CURRENT MEDICATIONS:   Home medications reviewed. SURGICAL HISTORY:   Past Surgical History:   Procedure Laterality Date    ABDOMEN SURGERY      BREAST SURGERY      CHOLECYSTECTOMY      HYSTERECTOMY      OTHER SURGICAL HISTORY  07/16/2018    exp lap with TUNG       FAMILY HISTORY:   Family History   Problem Relation Age of Onset    Heart Disease Mother     Diabetes Mother     Cancer Father     Diabetes Brother        SOCIAL HISTORY:   Social History     Socioeconomic History    Marital status:      Spouse name: Not on file    Number of children: Not on file    Years of education: Not on file    Highest education level: Not on file   Occupational History    Not on file   Tobacco Use    Smoking status: Former Smoker     Packs/day: 0.50     Years: 50.00     Pack years: 25.00     Types: Cigarettes     Quit date: 11/19/2018     Years since quitting: 3.0    Smokeless tobacco: Never Used    Tobacco comment: Off and on smoker pt states.    Vaping Use    Vaping Use: Never used   Substance and Sexual Activity    Alcohol use: No    Drug use: No    Sexual activity: Not Currently   Other Topics Concern    Not on file   Social History Narrative    Not on file     Social Determinants of Health     Financial Resource Strain:     Difficulty of Paying Living Expenses: Not on file   Food Insecurity:     Worried About Running Out of Food in the Last Year: Not on file    Allyssa of Food in the Last Year: Not on file Transportation Needs:     Lack of Transportation (Medical): Not on file    Lack of Transportation (Non-Medical): Not on file   Physical Activity:     Days of Exercise per Week: Not on file    Minutes of Exercise per Session: Not on file   Stress:     Feeling of Stress : Not on file   Social Connections:     Frequency of Communication with Friends and Family: Not on file    Frequency of Social Gatherings with Friends and Family: Not on file    Attends Confucianist Services: Not on file    Active Member of [address] [place] [address] or Organizations: Not on file    Attends Club or Organization Meetings: Not on file    Marital Status: Not on file   Intimate Partner Violence:     Fear of Current or Ex-Partner: Not on file    Emotionally Abused: Not on file    Physically Abused: Not on file    Sexually Abused: Not on file   Housing Stability:     Unable to Pay for Housing in the Last Year: Not on file    Number of [place] in the Last Year: Not on file    Unstable Housing in the Last Year: Not on file       ALLERGIES: Pravastatin sodium    PHYSICAL EXAM:  VITAL SIGNS:   ED Triage Vitals [12/01/21 1343]   Enc Vitals Group      BP (!) 157/86      Pulse 103      Resp 24      Temp 98.1 °F (36.7 °C)      Temp Source Oral      SpO2 (!) 78 %      Weight 84 lb 6.4 oz (38.3 kg)      Height 5' 3\" (1.6 m)      Head Circumference       Peak Flow       Pain Score       Pain Loc       Pain Edu? Excl. in [address]? Constitutional:  Non-toxic appearance  HENT: Normocephalic, Atraumatic, Bilateral external ears normal, Oropharynx moist, No oral exudates, Nose normal.  Eyes:  PERRL, Conjunctiva normal, No discharge. Neck: Normal range of motion, No tenderness, Supple, No stridor, No lymphadenopathy. Cardiovascular:  Normal heart rate, Normal rhythm  Pulmonary/Chest: Harsh frequent cough, mildly dyspneic, no wheezing, no significant respiratory distress   Abdomen:   Bowel sounds normal, Soft, No tenderness, No masses, No pulsatile masses  Back:  No tenderness, No CVA tenderness  Extremities:  Normal range of motion, Intact distal pulses, No edema, No tenderness  Skin:  Warm, Dry, No erythema, No rash    EKG Interpretation  Interpreted by me  Compared to 3/18/20  Rhythm: normal sinus  Rate: normal 91  Axis: normal  Ectopy: none  Conduction: normal  ST Segments: no acute change  T Waves: no acute change  Clinical Impression: normal sinus rhythm, no acute change    Cardiac Monitor Strip Interpretation  Interpreted by me  Monitor strip interpreted for greater than 10 seconds  Rhythm: normal sinus  Rate: normal  Ectopy: none  ST Segments: normal      Radiology / Procedures:          CTA PULMONARY W CONTRAST (Final result)  Result time 12/01/21 15:59:46  Final result by Nadya Olivera MD (12/01/21 15:59:46)                Impression:    No evidence of pulmonary embolism or aortic dissection. Masslike, spiculated opacity in the right infrahilar region.  While that   could represent focal pneumonia, neoplasm is considered more likely.  Further   evaluation with PET-CT and/or histopathologic correlation is recommended. There is bibasilar consolidation, concerning for pneumonia or aspiration. Scattered areas of airspace disease seen within the mid upper lungs, most   compatible with additional pneumonia. Diffuse bronchial wall thickening.  Filling defects within the lower lung   airways may reflect mucous plugging versus aspiration. Narrative:    EXAMINATION:   CTA OF THE CHEST 12/1/2021 12:13 pm     TECHNIQUE:   CTA of the chest was performed after the administration of intravenous   contrast.  Multiplanar reformatted images are provided for review.  MIP   images are provided for review. Dose modulation, iterative reconstruction,   and/or weight based adjustment of the mA/kV was utilized to reduce the   radiation dose to as low as reasonably achievable.      COMPARISON:   Chest CT, 03/18/2020     HISTORY:   ORDERING SYSTEM PROVIDED HISTORY: Shortness of breath   TECHNOLOGIST PROVIDED HISTORY:   Reason for exam:->Shortness of breath   Decision Support Exception - unselect if not a suspected or confirmed   emergency medical condition->Emergency Medical Condition (MA)   Reason for Exam: Shortness of breath   Acuity: Acute   Type of Exam: Initial   Additional signs and symptoms: productive cough   Relevant Medical/Surgical History: Hx Breast surg, Michelle, COPD / 55cc   [id number]     FINDINGS:   Pulmonary Arteries: The pulmonary arteries are adequately opacified.  No   filling defects are identified within the pulmonary arteries to suggest   pulmonary embolism.  The     Mediastinum: Visualized thyroid unremarkable.  Mildly enlarged bilateral   hilar lymph nodes are seen.  For example, on the right, lymph node measures   1.4 x 0.9 cm (series 4, image 58).  On the left, lymph node measures 1.5 x   0.9 cm (series 4, image 53). Esophagus is patulous but otherwise unremarkable.  Cardiac chambers are   enlarged.  No pericardial effusion.  Thoracic aorta is normal in caliber   without evidence of dissection. Lungs/pleura: There has a new masslike spiculated opacity seen in the right   infrahilar region, enveloping the bronchovascular bundle. Consolidation is seen within the right lower lobe more inferiorly. Additional consolidation is identified within the left lower lobe. Garrett Duff is   diffuse bronchial wall thickening.  Filling defects are identified within the   lower lung airways bilaterally, especially on the left.  Scattered areas of   airspace disease are seen within the mid upper lungs bilaterally. Lobulated nodule within the right middle lobe is unchanged measuring   approximately 7 mm (series 3, image 78).  This has been is present since at   least 2017 and is considered benign. All these findings are seen on a background of emphysema.  Small bilateral   pleural effusions are present.      Upper Abdomen: Unremarkable. Soft Tissues/Bones: Bilateral breast prostheses noted.  Visualized extra   thoracic soft tissues otherwise unremarkable.  No acute or suspicious bony   abnormalities are detected. Labs Reviewed   CBC WITH AUTO DIFFERENTIAL - Abnormal; Notable for the following components:       Result Value    WBC 13.6 (*)     MCHC 31.5 (*)     Segs Relative 71.2 (*)     Lymphocytes % 16.2 (*)     Monocytes % 9.9 (*)     Immature Neutrophil % 1.7 (*)     All other components within normal limits   COMPREHENSIVE METABOLIC PANEL - Abnormal; Notable for the following components:    Chloride 96 (*)     CO2 34 (*)     CREATININE 0.5 (*)     Total Protein 6.1 (*)     All other components within normal limits   BLOOD GAS, VENOUS - Abnormal; Notable for the following components:    pCO2, Angel 57.7 (*)     pO2, Angel 62.9 (*)     Base Exc, Mixed 9.2 (*)     HCO3, Venous 36.5 (*)     O2 Sat, Angel 91.1 (*)     All other components within normal limits   COVID-19, RAPID   CULTURE, BLOOD 2   CULTURE, BLOOD 1   TROPONIN   BRAIN NATRIURETIC PEPTIDE       ED COURSE & MEDICAL DECISION MAKING:  Pertinent Labs & Imaging studies reviewed. (See chart for details)  On exam, the patient is afebrile and nontoxic appearing. She is mildly tachycardic. She is mildly hypertensive with a known history of hypertension and is asymptomatic. She is hypoxic at 78% on room air. She is placed on 2 L by nasal cannula with improvement to 95%. She is otherwise hemodynamically stable and neurologically intact. EKG shows a normal sinus rhythm with no ST elevation or depression. Labs are obtained and are significant for leukocytosis with no other clinically significant lab abnormalities. CT chest was obtained and shows no evidence of pulmonary embolism or aortic dissection. There is a masslike spiculated opacity in the right infrahilar region. This is concerning for neoplasm versus focal pneumonia which is less likely.   There is bibasilar consolidation concerning for pneumonia or aspiration. There are scattered areas of airspace opacity seen within the mid upper lungs, most compatible with additional pneumonia. There is diffuse bronchial wall thickening. Filling defects within the lower lung airways may reflect mucous plugging versus aspiration. The patient was treated with IV Rocephin, Zithromax, albuterol, IV Solu-Medrol, Tessalon Perles and Robitussin with some improvement. I suspect that the patient has multifocal pneumonia and a COPD exacerbation causing hypoxia. I have a low suspicion for PTX, PE, STEMI, flash pulmonary edema, respiratory acidosis or sepsis. I recommended admission to the hospital and the patient was agreeable with transfer to [facility]. I discussed the case with the hospitalist at [facility], Dr. Mel Moulton who accepted the patient in transfer. The patient is in stable condition awaiting a bed at [facility]. Clinical Impression:  1. Multifocal pneumonia    2. COPD exacerbation ([address]    3. Hypoxia    4. Leukocytosis, unspecified type    5. Lung mass      Comment: Please note this report has been produced using speech recognition software and may contain errors related to that system including errors in grammar, punctuation, and spelling, as well as words and phrases that may be inappropriate. If there are any questions or concerns please feel free to contact the dictating provider for clarification.         Dipika Still MD  12/01/21 2036

## 2021-12-01 NOTE — ED NOTES
The patient presents to the er today with complaints of \" an upper respiratory infection since 10/27/2021. \"  She reports of not being vaccinated for COVID-19, that she has had several negative tests, that she has been treated and released from the facility recently due to shortness of breath and cough. She reports that \" we wanted to admit her, but she refused. Today her initial 02 saturation was 78% on room air. She was started on 2 L/M by cannula and the saturation improved to 94%.       Katya Elliott RN  12/01/21 5325

## 2021-12-01 NOTE — PLAN OF CARE
Contacted to accept patient in transfer. Patient presents with shortness of breath and hypoxia. Recent ED visit x2 for COPD exacerbation. CT angiogram of the chest done revealed right infrahilar spiculated lung mass, suspicious for malignancy versus infection. There is also bilateral consolidation suspicious for aspiration as well as evidence of mucous plugging. Patient was sent to be hypoxic on room air down to 78%. She received bronchodilator nebulizers and also given antibiotics with ceftriaxone and azithromycin. I have accepted this patient to medical/surgical floor as inpatient with telemetry.

## 2021-12-01 NOTE — ED NOTES
Contacted the [address] to begin the transfer process.                     Juan Antonio Galeano RN  12/01/21 9510

## 2021-12-02 LAB
ADENOVIRUS DETECTION BY PCR: NOT DETECTED
ANION GAP SERPL CALCULATED.3IONS-SCNC: 14 MMOL/L (ref 4–16)
BASOPHILS ABSOLUTE: 0 K/CU MM
BASOPHILS RELATIVE PERCENT: 0.3 % (ref 0–1)
BORDETELLA PARAPERTUSSIS BY PCR: NOT DETECTED
BORDETELLA PERTUSSIS PCR: NOT DETECTED
BUN BLDV-MCNC: 15 MG/DL (ref 6–23)
CALCIUM SERPL-MCNC: 8.9 MG/DL (ref 8.3–10.6)
CHLAMYDOPHILA PNEUMONIA PCR: NOT DETECTED
CHLORIDE BLD-SCNC: 97 MMOL/L (ref 99–110)
CO2: 29 MMOL/L (ref 21–32)
CORONAVIRUS 229E PCR: NOT DETECTED
CORONAVIRUS HKU1 PCR: NOT DETECTED
CORONAVIRUS NL63 PCR: NOT DETECTED
CORONAVIRUS OC43 PCR: NOT DETECTED
CREAT SERPL-MCNC: 0.7 MG/DL (ref 0.6–1.1)
DIFFERENTIAL TYPE: ABNORMAL
EKG ATRIAL RATE: 91 BPM
EKG DIAGNOSIS: NORMAL
EKG P AXIS: 72 DEGREES
EKG P-R INTERVAL: 120 MS
EKG Q-T INTERVAL: 344 MS
EKG QRS DURATION: 78 MS
EKG QTC CALCULATION (BAZETT): 423 MS
EKG R AXIS: 78 DEGREES
EKG T AXIS: 65 DEGREES
EKG VENTRICULAR RATE: 91 BPM
EOSINOPHILS ABSOLUTE: 0 K/CU MM
EOSINOPHILS RELATIVE PERCENT: 0 % (ref 0–3)
GFR AFRICAN AMERICAN: >60 ML/MIN/1.73M2
GFR NON-AFRICAN AMERICAN: >60 ML/MIN/1.73M2
GLUCOSE BLD-MCNC: 152 MG/DL (ref 70–99)
HCT VFR BLD CALC: 44.9 % (ref 37–47)
HEMOGLOBIN: 14.3 GM/DL (ref 12.5–16)
HUMAN METAPNEUMOVIRUS PCR: NOT DETECTED
IMMATURE NEUTROPHIL %: 0.9 % (ref 0–0.43)
INFLUENZA A BY PCR: NOT DETECTED
INFLUENZA A H1 (2009) PCR: NOT DETECTED
INFLUENZA A H1 PANDEMIC PCR: NOT DETECTED
INFLUENZA A H3 PCR: NOT DETECTED
INFLUENZA B BY PCR: NOT DETECTED
LYMPHOCYTES ABSOLUTE: 0.8 K/CU MM
LYMPHOCYTES RELATIVE PERCENT: 5.7 % (ref 24–44)
MCH RBC QN AUTO: 29.9 PG (ref 27–31)
MCHC RBC AUTO-ENTMCNC: 31.8 % (ref 32–36)
MCV RBC AUTO: 93.7 FL (ref 78–100)
MONOCYTES ABSOLUTE: 0.4 K/CU MM
MONOCYTES RELATIVE PERCENT: 3.3 % (ref 0–4)
MYCOPLASMA PNEUMONIAE PCR: NOT DETECTED
NUCLEATED RBC %: 0 %
PARAINFLUENZA 1 PCR: NOT DETECTED
PARAINFLUENZA 2 PCR: NOT DETECTED
PARAINFLUENZA 3 PCR: NOT DETECTED
PARAINFLUENZA 4 PCR: NOT DETECTED
PDW BLD-RTO: 13.7 % (ref 11.7–14.9)
PLATELET # BLD: 251 K/CU MM (ref 140–440)
PMV BLD AUTO: 10.9 FL (ref 7.5–11.1)
POTASSIUM SERPL-SCNC: 4.6 MMOL/L (ref 3.5–5.1)
PROCALCITONIN: 0.18
RBC # BLD: 4.79 M/CU MM (ref 4.2–5.4)
RHINOVIRUS ENTEROVIRUS PCR: ABNORMAL
RSV PCR: NOT DETECTED
SARS-COV-2: NOT DETECTED
SEGMENTED NEUTROPHILS ABSOLUTE COUNT: 12 K/CU MM
SEGMENTED NEUTROPHILS RELATIVE PERCENT: 89.8 % (ref 36–66)
SODIUM BLD-SCNC: 140 MMOL/L (ref 135–145)
TOTAL IMMATURE NEUTOROPHIL: 0.12 K/CU MM
TOTAL NUCLEATED RBC: 0 K/CU MM
WBC # BLD: 13.4 K/CU MM (ref 4–10.5)

## 2021-12-02 PROCEDURE — 87070 CULTURE OTHR SPECIMN AEROBIC: CPT

## 2021-12-02 PROCEDURE — 1200000000 HC SEMI PRIVATE

## 2021-12-02 PROCEDURE — 2580000003 HC RX 258: Performed by: NURSE PRACTITIONER

## 2021-12-02 PROCEDURE — 80048 BASIC METABOLIC PNL TOTAL CA: CPT

## 2021-12-02 PROCEDURE — 6370000000 HC RX 637 (ALT 250 FOR IP): Performed by: NURSE PRACTITIONER

## 2021-12-02 PROCEDURE — 6370000000 HC RX 637 (ALT 250 FOR IP): Performed by: INTERNAL MEDICINE

## 2021-12-02 PROCEDURE — 94761 N-INVAS EAR/PLS OXIMETRY MLT: CPT

## 2021-12-02 PROCEDURE — 92610 EVALUATE SWALLOWING FUNCTION: CPT

## 2021-12-02 PROCEDURE — 87205 SMEAR GRAM STAIN: CPT

## 2021-12-02 PROCEDURE — 93010 ELECTROCARDIOGRAM REPORT: CPT | Performed by: INTERNAL MEDICINE

## 2021-12-02 PROCEDURE — 84145 PROCALCITONIN (PCT): CPT

## 2021-12-02 PROCEDURE — 2700000000 HC OXYGEN THERAPY PER DAY

## 2021-12-02 PROCEDURE — 94640 AIRWAY INHALATION TREATMENT: CPT

## 2021-12-02 PROCEDURE — 36415 COLL VENOUS BLD VENIPUNCTURE: CPT

## 2021-12-02 PROCEDURE — 6360000002 HC RX W HCPCS: Performed by: NURSE PRACTITIONER

## 2021-12-02 PROCEDURE — 0202U NFCT DS 22 TRGT SARS-COV-2: CPT

## 2021-12-02 PROCEDURE — 85025 COMPLETE CBC W/AUTO DIFF WBC: CPT

## 2021-12-02 RX ORDER — PANTOPRAZOLE SODIUM 40 MG/1
40 TABLET, DELAYED RELEASE ORAL
Status: DISCONTINUED | OUTPATIENT
Start: 2021-12-02 | End: 2021-12-06 | Stop reason: HOSPADM

## 2021-12-02 RX ORDER — ASPIRIN 81 MG/1
81 TABLET, CHEWABLE ORAL DAILY
Status: DISCONTINUED | OUTPATIENT
Start: 2021-12-02 | End: 2021-12-06 | Stop reason: HOSPADM

## 2021-12-02 RX ORDER — POLYETHYLENE GLYCOL 3350 17 G/17G
17 POWDER, FOR SOLUTION ORAL DAILY PRN
Status: DISCONTINUED | OUTPATIENT
Start: 2021-12-02 | End: 2021-12-06 | Stop reason: HOSPADM

## 2021-12-02 RX ORDER — ONDANSETRON 2 MG/ML
4 INJECTION INTRAMUSCULAR; INTRAVENOUS EVERY 6 HOURS PRN
Status: DISCONTINUED | OUTPATIENT
Start: 2021-12-02 | End: 2021-12-06 | Stop reason: HOSPADM

## 2021-12-02 RX ORDER — FLUTICASONE FUROATE AND VILANTEROL 200; 25 UG/1; UG/1
1 POWDER RESPIRATORY (INHALATION) DAILY
Status: DISCONTINUED | OUTPATIENT
Start: 2021-12-02 | End: 2021-12-02 | Stop reason: CLARIF

## 2021-12-02 RX ORDER — BENZONATATE 100 MG/1
100 CAPSULE ORAL 3 TIMES DAILY PRN
Status: DISCONTINUED | OUTPATIENT
Start: 2021-12-02 | End: 2021-12-06 | Stop reason: HOSPADM

## 2021-12-02 RX ORDER — SODIUM CHLORIDE 0.9 % (FLUSH) 0.9 %
5-40 SYRINGE (ML) INJECTION EVERY 12 HOURS SCHEDULED
Status: DISCONTINUED | OUTPATIENT
Start: 2021-12-02 | End: 2021-12-06 | Stop reason: HOSPADM

## 2021-12-02 RX ORDER — SODIUM CHLORIDE 0.9 % (FLUSH) 0.9 %
5-40 SYRINGE (ML) INJECTION PRN
Status: DISCONTINUED | OUTPATIENT
Start: 2021-12-02 | End: 2021-12-06 | Stop reason: HOSPADM

## 2021-12-02 RX ORDER — ACETAMINOPHEN 325 MG/1
650 TABLET ORAL EVERY 6 HOURS PRN
Status: DISCONTINUED | OUTPATIENT
Start: 2021-12-02 | End: 2021-12-06 | Stop reason: HOSPADM

## 2021-12-02 RX ORDER — MONTELUKAST SODIUM 10 MG/1
10 TABLET ORAL NIGHTLY
Status: DISCONTINUED | OUTPATIENT
Start: 2021-12-02 | End: 2021-12-06 | Stop reason: HOSPADM

## 2021-12-02 RX ORDER — ALBUTEROL SULFATE 90 UG/1
2 AEROSOL, METERED RESPIRATORY (INHALATION) 4 TIMES DAILY PRN
Status: DISCONTINUED | OUTPATIENT
Start: 2021-12-02 | End: 2021-12-06 | Stop reason: HOSPADM

## 2021-12-02 RX ORDER — SODIUM CHLORIDE 9 MG/ML
25 INJECTION, SOLUTION INTRAVENOUS PRN
Status: DISCONTINUED | OUTPATIENT
Start: 2021-12-02 | End: 2021-12-06 | Stop reason: HOSPADM

## 2021-12-02 RX ORDER — BUDESONIDE AND FORMOTEROL FUMARATE DIHYDRATE 160; 4.5 UG/1; UG/1
2 AEROSOL RESPIRATORY (INHALATION) 2 TIMES DAILY
Status: DISCONTINUED | OUTPATIENT
Start: 2021-12-02 | End: 2021-12-06 | Stop reason: HOSPADM

## 2021-12-02 RX ORDER — TIZANIDINE 4 MG/1
4 TABLET ORAL NIGHTLY PRN
Status: DISCONTINUED | OUTPATIENT
Start: 2021-12-02 | End: 2021-12-06 | Stop reason: HOSPADM

## 2021-12-02 RX ORDER — IPRATROPIUM BROMIDE AND ALBUTEROL SULFATE 2.5; .5 MG/3ML; MG/3ML
1 SOLUTION RESPIRATORY (INHALATION)
Status: DISCONTINUED | OUTPATIENT
Start: 2021-12-02 | End: 2021-12-02

## 2021-12-02 RX ORDER — GUAIFENESIN 600 MG/1
600 TABLET, EXTENDED RELEASE ORAL 2 TIMES DAILY
Status: DISCONTINUED | OUTPATIENT
Start: 2021-12-02 | End: 2021-12-02

## 2021-12-02 RX ORDER — ONDANSETRON 4 MG/1
4 TABLET, ORALLY DISINTEGRATING ORAL EVERY 8 HOURS PRN
Status: DISCONTINUED | OUTPATIENT
Start: 2021-12-02 | End: 2021-12-06 | Stop reason: HOSPADM

## 2021-12-02 RX ORDER — GUAIFENESIN 600 MG/1
600 TABLET, EXTENDED RELEASE ORAL 3 TIMES DAILY
Status: DISCONTINUED | OUTPATIENT
Start: 2021-12-02 | End: 2021-12-06 | Stop reason: HOSPADM

## 2021-12-02 RX ORDER — CHOLECALCIFEROL (VITAMIN D3) 125 MCG
10 CAPSULE ORAL NIGHTLY
Status: DISCONTINUED | OUTPATIENT
Start: 2021-12-02 | End: 2021-12-06 | Stop reason: HOSPADM

## 2021-12-02 RX ORDER — OMEPRAZOLE 20 MG/1
20 CAPSULE, DELAYED RELEASE ORAL
Status: DISCONTINUED | OUTPATIENT
Start: 2021-12-02 | End: 2021-12-02 | Stop reason: CLARIF

## 2021-12-02 RX ORDER — ALBUTEROL SULFATE 90 UG/1
2 AEROSOL, METERED RESPIRATORY (INHALATION) 4 TIMES DAILY
Status: DISCONTINUED | OUTPATIENT
Start: 2021-12-02 | End: 2021-12-06 | Stop reason: HOSPADM

## 2021-12-02 RX ORDER — PREDNISONE 20 MG/1
40 TABLET ORAL DAILY
Status: DISCONTINUED | OUTPATIENT
Start: 2021-12-04 | End: 2021-12-03

## 2021-12-02 RX ORDER — METHYLPREDNISOLONE SODIUM SUCCINATE 40 MG/ML
40 INJECTION, POWDER, LYOPHILIZED, FOR SOLUTION INTRAMUSCULAR; INTRAVENOUS EVERY 6 HOURS
Status: DISCONTINUED | OUTPATIENT
Start: 2021-12-02 | End: 2021-12-03

## 2021-12-02 RX ORDER — CLONAZEPAM 1 MG/1
1 TABLET ORAL 2 TIMES DAILY PRN
Status: DISCONTINUED | OUTPATIENT
Start: 2021-12-02 | End: 2021-12-06 | Stop reason: HOSPADM

## 2021-12-02 RX ORDER — ACETAMINOPHEN 650 MG/1
650 SUPPOSITORY RECTAL EVERY 6 HOURS PRN
Status: DISCONTINUED | OUTPATIENT
Start: 2021-12-02 | End: 2021-12-06 | Stop reason: HOSPADM

## 2021-12-02 RX ORDER — TRAZODONE HYDROCHLORIDE 50 MG/1
50 TABLET ORAL NIGHTLY PRN
Status: DISCONTINUED | OUTPATIENT
Start: 2021-12-02 | End: 2021-12-06 | Stop reason: HOSPADM

## 2021-12-02 RX ADMIN — Medication 2 PUFF: at 15:42

## 2021-12-02 RX ADMIN — METHYLPREDNISOLONE SODIUM SUCCINATE 40 MG: 40 INJECTION, POWDER, FOR SOLUTION INTRAMUSCULAR; INTRAVENOUS at 03:48

## 2021-12-02 RX ADMIN — PANTOPRAZOLE SODIUM 40 MG: 40 TABLET, DELAYED RELEASE ORAL at 05:50

## 2021-12-02 RX ADMIN — ALBUTEROL SULFATE 2 PUFF: 90 AEROSOL, METERED RESPIRATORY (INHALATION) at 21:12

## 2021-12-02 RX ADMIN — Medication 2 PUFF: at 21:13

## 2021-12-02 RX ADMIN — Medication 10 MG: at 22:53

## 2021-12-02 RX ADMIN — ENOXAPARIN SODIUM 40 MG: 40 INJECTION SUBCUTANEOUS at 09:31

## 2021-12-02 RX ADMIN — ASPIRIN 81 MG: 81 TABLET, CHEWABLE ORAL at 09:31

## 2021-12-02 RX ADMIN — SODIUM CHLORIDE 25 ML: 9 INJECTION, SOLUTION INTRAVENOUS at 17:49

## 2021-12-02 RX ADMIN — Medication 2 PUFF: at 08:33

## 2021-12-02 RX ADMIN — GUAIFENESIN 600 MG: 600 TABLET, EXTENDED RELEASE ORAL at 22:52

## 2021-12-02 RX ADMIN — SODIUM CHLORIDE, PRESERVATIVE FREE 10 ML: 5 INJECTION INTRAVENOUS at 09:32

## 2021-12-02 RX ADMIN — MONTELUKAST 10 MG: 10 TABLET, FILM COATED ORAL at 22:53

## 2021-12-02 RX ADMIN — METHYLPREDNISOLONE SODIUM SUCCINATE 40 MG: 40 INJECTION, POWDER, FOR SOLUTION INTRAMUSCULAR; INTRAVENOUS at 09:31

## 2021-12-02 RX ADMIN — SODIUM CHLORIDE 25 ML: 9 INJECTION, SOLUTION INTRAVENOUS at 19:16

## 2021-12-02 RX ADMIN — CLONAZEPAM 1 MG: 1 TABLET ORAL at 12:40

## 2021-12-02 RX ADMIN — ALBUTEROL SULFATE 2 PUFF: 90 AEROSOL, METERED RESPIRATORY (INHALATION) at 11:23

## 2021-12-02 RX ADMIN — TRAZODONE HYDROCHLORIDE 50 MG: 50 TABLET ORAL at 22:52

## 2021-12-02 RX ADMIN — BUDESONIDE AND FORMOTEROL FUMARATE DIHYDRATE 2 PUFF: 160; 4.5 AEROSOL RESPIRATORY (INHALATION) at 08:32

## 2021-12-02 RX ADMIN — METHYLPREDNISOLONE SODIUM SUCCINATE 40 MG: 40 INJECTION, POWDER, FOR SOLUTION INTRAMUSCULAR; INTRAVENOUS at 17:54

## 2021-12-02 RX ADMIN — METHYLPREDNISOLONE SODIUM SUCCINATE 40 MG: 40 INJECTION, POWDER, FOR SOLUTION INTRAMUSCULAR; INTRAVENOUS at 22:52

## 2021-12-02 RX ADMIN — CEFTRIAXONE 1000 MG: 1 INJECTION, POWDER, FOR SOLUTION INTRAMUSCULAR; INTRAVENOUS at 17:53

## 2021-12-02 RX ADMIN — BUDESONIDE AND FORMOTEROL FUMARATE DIHYDRATE 2 PUFF: 160; 4.5 AEROSOL RESPIRATORY (INHALATION) at 21:14

## 2021-12-02 RX ADMIN — GUAIFENESIN 600 MG: 600 TABLET, EXTENDED RELEASE ORAL at 14:00

## 2021-12-02 RX ADMIN — SODIUM CHLORIDE, PRESERVATIVE FREE 10 ML: 5 INJECTION INTRAVENOUS at 22:55

## 2021-12-02 RX ADMIN — ALBUTEROL SULFATE 2 PUFF: 90 AEROSOL, METERED RESPIRATORY (INHALATION) at 15:42

## 2021-12-02 RX ADMIN — GUAIFENESIN 600 MG: 600 TABLET, EXTENDED RELEASE ORAL at 09:31

## 2021-12-02 RX ADMIN — ALBUTEROL SULFATE 2 PUFF: 90 AEROSOL, METERED RESPIRATORY (INHALATION) at 08:32

## 2021-12-02 RX ADMIN — Medication 2 PUFF: at 11:23

## 2021-12-02 RX ADMIN — THEOPHYLLINE ANHYDROUS 100 MG: 100 CAPSULE, EXTENDED RELEASE ORAL at 22:52

## 2021-12-02 RX ADMIN — AZITHROMYCIN MONOHYDRATE 500 MG: 500 INJECTION, POWDER, LYOPHILIZED, FOR SOLUTION INTRAVENOUS at 19:17

## 2021-12-02 RX ADMIN — THEOPHYLLINE ANHYDROUS 100 MG: 100 CAPSULE, EXTENDED RELEASE ORAL at 12:33

## 2021-12-02 ASSESSMENT — PAIN SCALES - GENERAL
PAINLEVEL_OUTOF10: 0

## 2021-12-02 NOTE — H&P
HISTORY AND PHYSICAL  (Hospitalist, Internal Medicine)  IDENTIFYING INFORMATION   PATIENT:  Sandra Molina  MRN:  [id number]  ADMIT DATE: 12/1/2021    Patient seen and examined 12/2/2021-3:45 AM  CHIEF COMPLAINT   Patient transferred from [facility] ED for hypoxic respiratory failure    HISTORY OF PRESENT ILLNESS   Sandra Molina is a 67 y.o. female with moderate COPD, not on home oxygen, anxiety disorder, insomnia, history of lung nodules, history of SBO (7/2018) presented to [facility] ED with complaints of worsening shortness of breath and hypoxia at home. Patient reported that she has been sick since 10/27. Patient was seen in her PCPs clinic and was prescribed steroids. Patient was tested negative for Covid at that time. Patient reported that steroids did not relieve her symptoms. Patient was seen in ER twice (11/1, 11/23. Patient was discharged on doxycycline, Erna Mean during ED encounter on 11/1. Patient again visited ER with no significant improvement in her symptoms and was prescribed prednisone, Mucinex, albuterol. Patient reported that her shortness of breath has been getting worse, currently unable to walk from bedroom to the bathroom without getting short of breath. Patient also reported having thin yellow-colored sputum. Patient denied any fever, chills. Patient denied any chest pain, abdominal pain, denied any urinary complaints, denied any constipation or diarrhea. Patient has been losing weight-reported that she was weighing 103 pounds initially and currently weighs 80 pounds. But could not tell me during what period of time she states she loses weight. Patient's PCP tried appetite stimulant but patient could not tolerate. With the shortness of breath patient has been getting panic attacks. Patient's daughter brought her a pulse oximeter and she has been checking her oxygen saturation. Initially her oxygen saturation would be around 95% but yesterday it was down to 82%, which prompted her daughter to take her to the ER. On presentation patient was noted to have /86, , RR 24, temp 98.1, saturating 98% on room air. Patient was placed on 2 L of oxygen and was saturating 93-95%. Lab work significant for WBC 13.6, CO2 34, troponin<0.010. Other lab work within normal range. VBG-pH 7.41, PCO2 57.7, PO2 62.9, HCO3 36.5. Rapid Covid negative. CTA chest was done. Patient received ceftriaxone, azithromycin, albuterol HFA, Robitussin, methylprednisolone 125 mg and transferred to [facility] for further evaluation and management. PAST MEDICAL HISTORY PAST SURGICAL HISTORY   moderate COPD, not on home oxygen, anxiety disorder, insomnia, history of lung nodules, history of SBO (7/2018) Cholecystectomy, hysterectomy, laparoscopic adhesiolysis-7/2018   FAMILY HISTORY SOCIAL HISTORY    Reviewed and noncontributory  Quit smoking 2 months ago, denies any alcohol or illicit drug use   MEDICATIONS ALLERGIES    Reviewed medications with patient is on clonazepam 1 mg twice daily as needed, Breo Ellipta, Singulair 10 mg nightly, omeprazole 20 mg daily, tizanidine 4 mg nightly, trazodone 50 mg nightly as needed, albuterol HFA as needed.     Pravastatin-causes muscle cramps       PAST MEDICAL, SURGICAL, FAMILY, and SOCIAL HISTORY         Past Medical History:   Diagnosis Date    Former smoker 1/5/2018    Hypertension     Moderate COPD (chronic obstructive pulmonary disease) ([address] 1/5/2018    Other emphysema ([facility] [address] 1/5/2018     Past Surgical History:   Procedure Laterality Date    ABDOMEN SURGERY      BREAST SURGERY      CHOLECYSTECTOMY      HYSTERECTOMY      OTHER SURGICAL HISTORY  07/16/2018    exp lap with TUNG     Family History   Problem Relation Age of Onset    Heart Disease Mother     Diabetes Mother     Cancer Father     Diabetes Brother      Family Hx of HTN  Family Hx as reviewed above, otherwise non-contributory  Social History     Socioeconomic History    Marital status:      Spouse name: None    Number of children: None    Years of education: None    Highest education level: None   Occupational History    None   Tobacco Use    Smoking status: Former Smoker     Packs/day: 0.50     Years: 50.00     Pack years: 25.00     Types: Cigarettes     Quit date: 11/19/2018     Years since quitting: 3.0    Smokeless tobacco: Never Used    Tobacco comment: Off and on smoker pt states. Vaping Use    Vaping Use: Never used   Substance and Sexual Activity    Alcohol use: No    Drug use: No    Sexual activity: Not Currently   Other Topics Concern    None   Social History Narrative    None     Social Determinants of Health     Financial Resource Strain:     Difficulty of Paying Living Expenses: Not on file   Food Insecurity:     Worried About Running Out of Food in the Last Year: Not on file    Allyssa of Food in the Last Year: Not on file   Transportation Needs:     Lack of Transportation (Medical): Not on file    Lack of Transportation (Non-Medical):  Not on file   Physical Activity:     Days of Exercise per Week: Not on file    Minutes of Exercise per Session: Not on file   Stress:     Feeling of Stress : Not on file   Social Connections:     Frequency of Communication with Friends and Family: Not on file    Frequency of Social Gatherings with Friends and Family: Not on file    Attends Hinduism Services: Not on file    Active Member of Clubs or Organizations: Not on file    Attends Club or Organization Meetings: Not on file    Marital Status: Not on file   Intimate Partner Violence:     Fear of Current or Ex-Partner: Not on file    Emotionally Abused: Not on file    Physically Abused: Not on file    Sexually Abused: Not on file   Housing Stability:     Unable to Pay for Housing in the Last Year: Not on file    Number of [place] in the Last Year: Not on file    Unstable Housing in the Last Year: Not on file       MEDICATIONS   Medications Prior to Admission  Medications Prior to Admission: albuterol sulfate HFA (VENTOLIN HFA) 108 (90 Base) MCG/ACT inhaler, Inhale 2 puffs into the lungs 4 times daily as needed for Wheezing  guaiFENesin (MUCINEX) 600 MG extended release tablet, Take 1 tablet by mouth 2 times daily for 15 days  Fluticasone furoate-vilanterol (BREO ELLIPTA) 200-25 MCG/INH AEPB inhaler, Inhale 1 puff into the lungs daily  VITAMIN A PO, Take by mouth  Ascorbic Acid (VITAMIN C ADULT GUMMIES PO), Take by mouth  VITAMIN D PO, Take by mouth  clonazePAM (KLONOPIN) 1 MG tablet, TAKE 1 TABLET TWICE DAILY  AS NEEDED FOR ANXIETY  tiZANidine (ZANAFLEX) 4 MG tablet, TAKE 1 TABLET BY MOUTH EVERY DAY AT NIGHT  montelukast (SINGULAIR) 10 MG tablet, TAKE 1 TABLET NIGHTLY  omeprazole (PRILOSEC) 20 MG delayed release capsule, Take 1 capsule by mouth every morning (before breakfast)  traZODone (DESYREL) 50 MG tablet, TAKE 1 TABLET NIGHTLY  melatonin 5 MG TABS tablet, Take 10 mg by mouth nightly   aspirin 81 MG tablet, Take 81 mg by mouth daily.   albuterol sulfate  (90 Base) MCG/ACT inhaler, USE 2 INHALATIONS ORALLY INTO THE LUNGS EVERY 6 HOURS AS NEEDED FOR WHEEZING OR SHORTNESS OF BREATH  sodium chloride (OCEAN, BABY AYR) 0.65 % nasal spray, 1 spray by Nasal route as needed for Congestion    Current Medications  Current Facility-Administered Medications   Medication Dose Route Frequency Provider Last Rate Last Admin    albuterol sulfate  (90 Base) MCG/ACT inhaler 2 puff  2 puff Inhalation 4x Daily PRN Linda RICHARD GainesN - CNP        aspirin chewable tablet 81 mg  81 mg Oral Daily Lindachar Gaines APRN - KT        guaiFENesin (MUCINEX) extended release tablet 600 mg  600 mg Oral BID Linda Gaines APRN - CNP        melatonin tablet 10 mg  10 mg Oral Nightly Lindachar Gaines APRN - CNP        montelukast (SINGULAIR) tablet 10 mg  10 mg Oral Nightly Linda Dapilah, APRN - CNP        sodium chloride (OCEAN, BABY AYR) 0.65 % nasal spray 1 spray  1 spray Nasal PRN Linda Dapilah, APRN - CNP        tiZANidine (ZANAFLEX) tablet 4 mg  4 mg Oral Nightly PRN Linda Dapilah, APRN - CNP        traZODone (DESYREL) tablet 50 mg  50 mg Oral Nightly PRN [facility], APRN - CNP        sodium chloride flush 0.9 % injection 5-40 mL  5-40 mL IntraVENous 2 times per day Linda Dapilah, APRN - CNP        sodium chloride flush 0.9 % injection 5-40 mL  5-40 mL IntraVENous PRN [facility], APRN - CNP        0.9 % sodium chloride infusion  25 mL IntraVENous PRN [facility], APRN - CNP        ondansetron (ZOFRAN-ODT) disintegrating tablet 4 mg  4 mg Oral Q8H PRN [facility], APRN - CNP        Or    ondansetron (ZOFRAN) injection 4 mg  4 mg IntraVENous Q6H PRN Linda [facility], APRN - CNP        polyethylene glycol (GLYCOLAX) packet 17 g  17 g Oral Daily PRN [facility], APRN - CNP        enoxaparin (LOVENOX) injection 40 mg  40 mg SubCUTAneous Daily [facility], APRN - CNP        acetaminophen (TYLENOL) tablet 650 mg  650 mg Oral Q6H PRN [facility], APRN - CNP        Or    acetaminophen (TYLENOL) suppository 650 mg  650 mg Rectal Q6H PRN [facility], APRN - CNP        methylPREDNISolone sodium (SOLU-MEDROL) injection 40 mg  40 mg IntraVENous Q6H [facility], APR[facility] - CNP        Followed by   Homer Grijalva ON 12/4/2021] predniSONE (DELTASONE) tablet 40 mg  40 mg Oral Daily Linda Brittaynlajany, APRN - CNP        cefTRIAXone (ROCEPHIN) 1000 mg IVPB in 50 mL D5W minibag  1,000 mg IntraVENous Q24H Linda Brittanylajany, APRN - CNP        azithromycin (ZITHROMAX) 500 mg in dextrose 5 % 250 mL IVPB  500 mg IntraVENous Q24H Linda Brittanylah, APRN - CNP        benzonatate (TESSALON) capsule 100 mg  100 mg Oral TID PRN Linda Dapilah, APRN - CNP        budesonide-formoterol (SYMBICORT) 160-4.5 MCG/ACT inhaler 2 puff  2 puff Inhalation BID MIREILLE Meehan - CNP        pantoprazole (PROTONIX) tablet 40 mg  40 mg Oral QAM AC Linda Gaines, APRN - CNP             Allergies  Allergies   Allergen Reactions    Pravastatin Sodium Other (See Comments)     Myalgias (note from 5/15/18)       REVIEW OF SYSTEMS   10 point review of systems conducted and pertinent positives and negatives as per HPI. PHYSICAL EXAM     Wt Readings from Last 3 Encounters:   12/02/21 87 lb 8.4 oz (39.7 kg)   11/23/21 84 lb 6.4 oz (38.3 kg)   11/01/21 84 lb 6.4 oz (38.3 kg)       Blood pressure (!) 145/81, pulse 84, temperature 98.4 °F (36.9 °C), temperature source Oral, resp. rate 18, height 5' 3\" (1.6 m), weight 87 lb 8.4 oz (39.7 kg), SpO2 93 %, not currently breastfeeding. GEN  -Awake, alert, frail-appearing. EYES   -PERRL. HENT  -MM are moist.   RESP  -LS CTA equal bilat, no wheezes, rales or rhonchi. Symmetric chest movement. No respiratory distress noted. C/V  -S1/S2 auscultated. RRR without appreciable M/R/G. No JVD or carotid bruits. Peripheral pulses equal bilaterally and palpable. No peripheral edema. No reproducible chest wall tenderness. GI  -Abdomen is soft, non-distended, no significant tenderness. No masses or guarding. + BS in all quadrants. Rectal exam deferred.   -No CVA tenderness. Bell catheter is not present. MS  -B/L extremities strong muscles strength. Full movements. No gross joint deformities. No swelling, intact sensation symmetrical.   SKIN  -Normal coloration, warm, dry. NEURO  - Awake, alert, oriented x 3, no focal deficits. PSYC  - Appropriate affect. LABS AND IMAGING     Results for Deb Benitez \"DUSTY\" (MRN [id number]) as of 12/2/2021 04:53   Ref.  Range 12/1/2021 13:55   Sodium Latest Ref Range: 135 - 145 MMOL/L 138   Potassium Latest Ref Range: 3.5 - 5.1 MMOL/L 4.0   Chloride Latest Ref Range: 99 - 110 mMol/L 96 (L)   CO2 Latest Ref Range: 21 - 32 MMOL/L 34 (H)   BUN Latest Ref Range: 6 - 23 MG/DL 14   Creatinine Latest Ref Range: 0.6 - 1.1 MG/DL 0.5 (L)   Anion Gap Latest Ref Range: 4 - 16  8   GFR Non- Latest Ref Range: >60 mL/min/1.73m2 >60   GFR African American Latest Ref Range: >60 mL/min/1.73m2 >60   Glucose Latest Ref Range: 70 - 99 MG/DL 99   Calcium Latest Ref Range: 8.3 - 10.6 MG/DL 9.3   Total Protein Latest Ref Range: 6.4 - 8.2 GM/DL 6.1 (L)   Pro-BNP Latest Ref Range: <300 PG/.3   Troponin T Latest Ref Range: <0.01 NG/ML <0.010   Albumin Latest Ref Range: 3.4 - 5.0 GM/DL 3.4   Alk Phos Latest Ref Range: 40 - 129 IU/L 118   ALT Latest Ref Range: 10 - 40 U/L 26   AST Latest Ref Range: 15 - 37 IU/L 19   Bilirubin Latest Ref Range: 0.0 - 1.0 MG/DL 0.5   WBC Latest Ref Range: 4.0 - 10.5 K/CU MM 13.6 (H)   RBC Latest Ref Range: 4.2 - 5.4 M/CU MM 4.82   Hemoglobin Quant Latest Ref Range: 12.5 - 16.0 GM/DL 14.0   Hematocrit Latest Ref Range: 37 - 47 % 44.5   MCV Latest Ref Range: 78 - 100 FL 92.3   MCH Latest Ref Range: 27 - 31 PG 29.0   MCHC Latest Ref Range: 32.0 - 36.0 % 31.5 (L)   MPV Latest Ref Range: 7.5 - 11.1 FL 10.5   RDW Latest Ref Range: 11.7 - 14.9 % 14.4   Platelet Count Latest Ref Range: 140 - 440 K/CU    Lymphocyte % Latest Ref Range: 24 - 44 % 16.2 (L)   Monocytes % Latest Ref Range: 0 - 4 % 9.9 (H)   Eosinophils % Latest Ref Range: 0 - 3 % 0.8   Basophils % Latest Ref Range: 0 - 1 % 0.2   Lymphocytes Absolute Latest Units: K/CU MM 2.2   Monocytes Absolute Latest Units: K/CU MM 1.4   Eosinophils Absolute Latest Units: K/CU MM 0.1   Basophils Absolute Latest Units: K/CU MM 0.0   Differential Type Unknown AUTOMATED DIFFERENTIAL   Segs Relative Latest Ref Range: 36 - 66 % 71.2 (H)   Segs Absolute Latest Units: K/CU MM 9.7   Immature Neutrophil % Latest Ref Range: 0 - 0.43 % 1.7 (H)   Total Immature Neutrophil Latest Units: K/CU MM 0.23   Base Excess Latest Ref Range: 0 - 2  MINUS   pH, Angel Latest Ref Range: 7.32 - 7.42  7.41   pCO2, Angel Latest Ref Range: 38 - 52 mmHG 57.7 (H)   pO2, Angel Latest Ref Range: 28 - 48 mmHG 62.9 (H)   HCO3, Venous Latest Ref Range: 19 - 25 MMOL/L 36.5 (H)   O2 Sat, Angel Latest Ref Range: 50 - 70 % 91.1 (H)   Base Exc, Mixed Latest Ref Range: 0 - 2  9.2 (H)     Results for Americo Hinkle \"DUSTY\" (MRN [id number]) as of 12/2/2021 04:53   Ref. Range 12/1/2021 15:32   SARS-CoV-2, NAAT Latest Ref Range: NOT DETECTED  NOT DETECTED     Results for Americo Hinkle \"DUSTY\" (MRN [id number]) as of 12/2/2021 04:53   Ref.  Range 12/2/2021 03:16   Rhinovirus Enterovirus PCR Latest Ref Range: NOT DETECTED  DETECTED BY PCR (A)   Human Metapneumovirus PCR Latest Ref Range: NOT DETECTED  NOT DETECTED   Adenovirus Detection by PCR Latest Ref Range: NOT DETECTED  NOT DETECTED   B Pertussis by PCR Latest Ref Range: NOT DETECTED  NOT DETECTED   Chlamydophila Pneumonia PCR Latest Ref Range: NOT DETECTED  NOT DETECTED   Coronavirus 229E PCR Latest Ref Range: NOT DETECTED  NOT DETECTED   Coronavirus HKU1 PCR Latest Ref Range: NOT DETECTED  NOT DETECTED   Coronavirus NL63 PCR Latest Ref Range: NOT DETECTED  NOT DETECTED   Coronavirus OC Latest Ref Range: NOT DETECTED  NOT DETECTED   Influenza A by PCR Latest Ref Range: NOT DETECTED  NOT DETECTED   Influenza A H1 (2009) PCR Latest Ref Range: NOT DETECTED  NOT DETECTED   Influenza A H1 Pandemic PCR Latest Ref Range: NOT DETECTED  NOT DETECTED   Influenza A H3 PCR Latest Ref Range: NOT DETECTED  NOT DETECTED   Influenza B by PCR Latest Ref Range: NOT DETECTED  NOT DETECTED   Parainfluenza 1 PCR Latest Ref Range: NOT DETECTED  NOT DETECTED   Parainfluenza 2 PCR Latest Ref Range: NOT DETECTED  NOT DETECTED   Parainfluenza 3 PCR Latest Ref Range: NOT DETECTED  NOT DETECTED   Parainfluenza 4 PCR Latest Ref Range: NOT DETECTED  NOT DETECTED   RSV PCR Latest Ref Range: NOT DETECTED  NOT DETECTED   Mycoplasma pneumo by PCR Latest Ref Range: NOT DETECTED  NOT DETECTED   Bordetella parapertussis by PCR Latest Ref Range: NOT DETECTED  NOT DETECTED   SARS-CoV-2 Latest Ref Range: NOT DETECTED  NOT DETECTED     Recent Imaging  CTA PULMONARY W CONTRAST [[id number]] Collected: 12/01/21 1542      Order Status: Completed Updated: 12/01/21 1602     Narrative:       EXAMINATION:   CTA OF THE CHEST 12/1/2021 12:13 pm     TECHNIQUE:   CTA of the chest was performed after the administration of intravenous   contrast.  Multiplanar reformatted images are provided for review.  MIP   images are provided for review. Dose modulation, iterative reconstruction,   and/or weight based adjustment of the mA/kV was utilized to reduce the   radiation dose to as low as reasonably achievable. COMPARISON:   Chest CT, 03/18/2020     HISTORY:   ORDERING SYSTEM PROVIDED HISTORY: Shortness of breath   TECHNOLOGIST PROVIDED HISTORY:   Reason for exam:->Shortness of breath   Decision Support Exception - unselect if not a suspected or confirmed   emergency medical condition->Emergency Medical Condition (MA)   Reason for Exam: Shortness of breath   Acuity: Acute   Type of Exam: Initial   Additional signs and symptoms: productive cough   Relevant Medical/Surgical History: Hx Breast surg, Michelle, COPD / 55cc   [id number]     FINDINGS:   Pulmonary Arteries: The pulmonary arteries are adequately opacified.  No   filling defects are identified within the pulmonary arteries to suggest   pulmonary embolism.  The     Mediastinum: Visualized thyroid unremarkable.  Mildly enlarged bilateral   hilar lymph nodes are seen.  For example, on the right, lymph node measures   1.4 x 0.9 cm (series 4, image 58).  On the left, lymph node measures 1.5 x   0.9 cm (series 4, image 53). Esophagus is patulous but otherwise unremarkable.  Cardiac chambers are   enlarged.  No pericardial effusion.  Thoracic aorta is normal in caliber   without evidence of dissection. Lungs/pleura: There has a new masslike spiculated opacity seen in the right   infrahilar region, enveloping the bronchovascular bundle.      Consolidation is seen within the right lower lobe more inferiorly. Additional consolidation is identified within the left lower lobe. Jolayne Cancel is   diffuse bronchial wall thickening.  Filling defects are identified within the   lower lung airways bilaterally, especially on the left.  Scattered areas of   airspace disease are seen within the mid upper lungs bilaterally. Lobulated nodule within the right middle lobe is unchanged measuring   approximately 7 mm (series 3, image 78).  This has been is present since at   least 2017 and is considered benign. All these findings are seen on a background of emphysema.  Small bilateral   pleural effusions are present. Upper Abdomen: Unremarkable. Soft Tissues/Bones: Bilateral breast prostheses noted.  Visualized extra   thoracic soft tissues otherwise unremarkable.  No acute or suspicious bony   abnormalities are detected.      Impression:       No evidence of pulmonary embolism or aortic dissection. Masslike, spiculated opacity in the right infrahilar region.  While that   could represent focal pneumonia, neoplasm is considered more likely.  Further   evaluation with PET-CT and/or histopathologic correlation is recommended. There is bibasilar consolidation, concerning for pneumonia or aspiration. Scattered areas of airspace disease seen within the mid upper lungs, most   compatible with additional pneumonia. Diffuse bronchial wall thickening.  Filling defects within the lower lung   airways may reflect mucous plugging versus aspiration.          Relevant labs and imaging reviewed    ASSESSMENT AND PLAN     #. Acute respiratory failure with hypoxia: Secondary to COPD exacerbation, pneumonia  -Continue IV methylprednisolone, bronchodilator therapy, antibiotics. -Respiratory viral panel-positive for RSV. -VBG-pH 7.41, PCO2 57.7, PO2 62.9, HCO3 36.5  -Consult pulmonology.     #.  Pneumonia:  -CTA chest-CTA masslike spiculated opacity in the right infrahilar region-could represent focal pneumonia, neoplasm is considered more likely. Bibasilar consolidation concerning for pneumonia or aspiration. Scattered areas of airspace disease seen within the mid upper lungs, most compatible with additional pneumonia. Diffuse bronchial wall thickening, filling defects within the lower lung airways may reflect mucous plugging versus aspiration.  -Continue antibiotics  -Patient denied any dysphagia-SLP evaluation ordered. #.  Leukocytosis: Secondary to above    #. moderate COPD, not on home oxygen  -Patient is on Singulair, Breo Ellipta, albuterol HFA as needed    #. anxiety disorder-clonazepam twice daily    #. Insomnia-trazodone    #. GERD: Omeprazole    #. history of lung nodules    #. history of SBO (7/2018)    #. Failure to thrive with BMI 15.50    DVT Prophylaxis: Lovenox  GI Prophylaxis: Protonix  Code Status: FULL.       Case d/w ED physician      Adam Fish MD  Hospitalist, Internal Medicine  12/2/2021 at 3:29 AM

## 2021-12-02 NOTE — CONSULTS
[address], [address] [facility]                                  CONSULTATION    PATIENT NAME: Dona Land                    :        1945  MED REC NO:   [id number]                          ROOM:  ACCOUNT NO:   [de-identified]                           ADMIT DATE: 2021  PROVIDER:     James Knowles MD    CONSULT DATE:  2021    HISTORY OF PRESENT ILLNESS:  The patient is a 76-year-old lady with  multiple medical problems including COPD and hypertension, who was  admitted through the emergency room with complaints of increasing  shortness of breath and cough productive of whitish to yellow phlegm. The patient initially saw her family doctor and was prescribed steroids  without much improvement. She tested negative for COVID-19. She was in  the ER twice and was discharged home on doxycycline and Tessalon Perles  without much improvement. She finally again came to the hospital.  She  had a CT scan of the chest, which showed no evidence of PE, right  infrahilar mass versus pneumonic infiltrate and bibasilar consolidation  concerning for pneumonia. She was subsequently admitted to the  hospital.  She has some evidence of mucus plugging in the lower lobes. PAST MEDICAL HISTORY:  Significant for hypertension, COPD. PAST SURGICAL HISTORY:  Remarkable for abdominal surgery, breast  surgery, cholecystectomy, hysterectomy. FAMILY HISTORY:  Reveals that her mother had diabetes, heart disease. Father had cancer. SOCIAL HISTORY:  Reveals that she quit smoking in 2018, but used to  smoke half pack per day for 50 years prior to that. No history of  alcohol or drug abuse. MEDICATIONS:  Her medications were reviewed. ALLERGIES:  She is allergic to PRAVASTATIN.     REVIEW OF SYSTEMS:  A 10-to 14-point review of systems were reviewed and  are negative except for what is mentioned in history of present illness. PHYSICAL EXAMINATION:  GENERAL:  The patient is alert, oriented x3, in no acute respiratory  distress. VITAL SIGNS:  Her blood pressure is 158/84 mmHg, pulse of 97 per minute,  and respiratory rate of 17 per minute. She is afebrile. Her saturation  is 94% on 3 liters nasal cannula. HEENT:  Exam is essentially unremarkable. There is no JVD, no  lymphadenopathy. NECK:  The neck is supple. LUNG:  Exam reveals diminished breath sounds and bibasilar crackles. HEART:  Exam shows normal S1, S2. There is no S3, S4 noted. ABDOMEN:  Exam is benign. There is no evidence of any organomegaly. The bowel sounds are present. NEUROLOGIC:  Exam reveals that she is awake and responsive, answering  questions appropriately and moving her extremities. DIAGNOSTIC STUDIES:  Her CAT scan of the chest showed no evidence of PE,  mass-like infiltrate in the right infrahilar region, bibasilar  infiltrate versus atelectasis, and some mucus plugging in the lower  lobes. Her electrolytes showed a sodium 138, potassium 4.0, chloride  96, carbon dioxide 34, BUN 14, creatinine 0.5. Her venous blood gases  showed a pH of 7.41, pCO2 of 57, pO2 of 62 with 91% saturation. Her CBC  showed a white count of 13.6, hemoglobin 14.0, hematocrit 44.1. IMPRESSION:  1. Acute respiratory failure secondary to right infrahilar and  bibasilar pneumonia and acute exacerbation of COPD. 2.  Right infrahilar and bibasilar pneumonia. 3.  Acute exacerbation of COPD. 4.  Rule out underlying malignancy. PLAN:  1. Continue present antibiotic therapy. 2.  Sputum for culture and sensitivity. 3.  Check CEA level. 4.  Continue present bronchodilator regimen. 5.  We will add Dewey-Dur 100 mg twice a day. 6.  Check CEA level. 7.  The patient will need PET scan in two to three weeks' time and if  the mass-like infiltrate persists, will need further workup. 8.  As per orders.         Ruth Ann Leyva MD    D: 12/02/2021 11:07:34       T: 12/02/2021 11:10:43     /[email or web address]  Job#: [id number]     Doc#: [id number]    CC:

## 2021-12-02 NOTE — PROGRESS NOTES
Comprehensive Nutrition Assessment    Type and Reason for Visit:  Initial    Nutrition Recommendations/Plan:  Encourage consistent PO intake via meals and supplements  Monitor wt changes and nutrition-related lab values    Nutrition Assessment:  Pt admitted with cough and SOB, symptoms started several weeks prior. Pt has hx of pneumonia but no pertinent nutriton problems. Has a good appetite, reports consuming all of her breakfast, but not hungry at lunch. Pt reports drinking 2 Ensure Plus supplements at home. Also reports recent wt loss but records do not show. NFPE completed pt meets criteria for malnutriton, will follow at high nutrition risk. Recommend oral nutrition supplement BID. Malnutrition Assessment:  Malnutrition Status:  Severe malnutrition    Context:  Acute Illness     Findings of the 6 clinical characteristics of malnutrition:  Energy Intake:  Unable to assess  Weight Loss:  Unable to assess     Body Fat Loss:  7 - Moderate body fat loss Orbital, Triceps, Buccal region   Muscle Mass Loss:  7 - Moderate muscle mass loss Clavicles (pectoralis & deltoids), Temples (temporalis), Hand (interosseous), Scapula (trapezius)  Fluid Accumulation:  Unable to assess     Strength:  Not Performed    Estimated Daily Nutrient Needs:  Energy (kcal):  2274-6313 kcals/day (30-35 kcal/kg); Weight Used for Energy Requirements:  Current     Protein (g):  47-55 g/day (1.2-1.4 g/kg); Weight Used for Protein Requirements:  Current        Fluid (ml/day):  1200 mL/day; Method Used for Fluid Requirements:  1 ml/kcal      Nutrition Related Findings:         Wounds:  None       Current Nutrition Therapies:    ADULT DIET;  Regular  ADULT ORAL NUTRITION SUPPLEMENT; Breakfast, Lunch; Standard High Calorie/High Protein Oral Supplement    Anthropometric Measures:  · Height: 5' 3\" (160 cm)  · Current Body Weight: 87 lb 8.4 oz (39.7 kg)   · Admission Body Weight:      · Usual Body Weight:       · Ideal Body Weight: 115 lbs; % Ideal Body Weight 76.1 %   · BMI: 15.5  · Adjusted Body Weight:  ;     · Adjusted BMI:      · BMI Categories: Underweight (BMI less than 22) age over 72       Nutrition Diagnosis:   · Severe malnutrition related to impaired respiratory function as evidenced by weight loss, severe loss of subcutaneous fat, severe muscle loss      Nutrition Interventions:   Food and/or Nutrient Delivery:  Continue Current Diet, Start Oral Nutrition Supplement  Nutrition Education/Counseling:  No recommendation at this time   Coordination of Nutrition Care:  Continue to monitor while inpatient    Goals:  Pt will consume 75% or greater of meals and supplements. Nutrition Monitoring and Evaluation:   Behavioral-Environmental Outcomes:  None Identified   Food/Nutrient Intake Outcomes:  Food and Nutrient Intake, Supplement Intake  Physical Signs/Symptoms Outcomes:  Biochemical Data, Nutrition Focused Physical Findings, Weight, Hemodynamic Status     Discharge Planning:     Too soon to determine     Electronically signed by Fer Mclaughlin on 12/2/21 at 3:53 PM EST    Contact: [phone]

## 2021-12-02 NOTE — PLAN OF CARE
Problem: Discharge Planning:  Goal: Discharged to appropriate level of care  Description: Discharged to appropriate level of care  Outcome: Ongoing     Problem:  Activity Intolerance:  Goal: Ability to tolerate increased activity will improve  Description: Ability to tolerate increased activity will improve  Outcome: Ongoing     Problem: Airway Clearance - Ineffective:  Goal: Ability to maintain a clear airway will improve  Description: Ability to maintain a clear airway will improve  Outcome: Ongoing     Problem: Breathing Pattern - Ineffective:  Goal: Ability to achieve and maintain a regular respiratory rate will improve  Description: Ability to achieve and maintain a regular respiratory rate will improve  Outcome: Ongoing     Problem: Gas Exchange - Impaired:  Goal: Levels of oxygenation will improve  Description: Levels of oxygenation will improve  Outcome: Ongoing     Problem: Safety:  Goal: Free from accidental physical injury  Description: Free from accidental physical injury  Outcome: Ongoing  Goal: Free from intentional harm  Description: Free from intentional harm  Outcome: Ongoing     Problem: Daily Care:  Goal: Daily care needs are met  Description: Daily care needs are met  Outcome: Ongoing     Problem: Discharge Planning:  Goal: Patients continuum of care needs are met  Description: Patients continuum of care needs are met  Outcome: Ongoing

## 2021-12-02 NOTE — PROGRESS NOTES
Speech Language Pathology  Facility/Department: [address]   CLINICAL BEDSIDE SWALLOW EVALUATION    NAME: Jerrica Mckeon  : 1945  MRN: [id number]    ADMISSION DATE: 2021  ADMITTING DIAGNOSIS: has Essential hypertension; Mixed hyperlipidemia; Anxiety; Primary insomnia; Lung nodule; Other emphysema ([address] Former smoker; Chronic obstructive pulmonary disease ([address] Vaginal dryness; H/O total hysterectomy; Vaginal atrophy; Leg cramping; Bowel obstruction ([address] SBO (small bowel obstruction) ([address] Adynamic ileus ([address] Tobacco abuse; Lower abdominal pain; Nausea; Abnormal CT of the abdomen; Ileus ([address] Gastroesophageal reflux disease without esophagitis; Anxiety and depression; Poor appetite; Acute on chronic respiratory failure with hypoxia ([address] Anemia; and PNA (pneumonia) on their problem list.      Impressions: Jerrica Mckeon was seen for a bedside swallowing evaluation after being admitted to [facility] with hypoxia. Pt was alert and cooperative throughout assessment. Relevant medical hx includes COPD, hypertension, lung nodules and anxiety. Pt denies any hx of dysphagia. Pt was positioned upright in bed and presented with a clear vocal quality and strong volitional cough. Oral mechanism examination was [facility] with no focal orofacial weakness. PO trials of regular solids and thin liquids by cup/straw sips were given. Oropharyngeal swallow appears grossly intact characterized by adequate mastication and oral clearance, timely swallow initiation and adequate laryngeal elevation. Clear vocal quality and 0 overt s/s of were observed with all PO trials given. Recommend regular diet/thin liquids with strict aspiration precautions. No further acute SLP needs were identified at this time.         ONSET DATE: this admission       Date of Eval: 2021  Evaluating Therapist: MANINDER Souza    Current Diet level:  Current Diet : Regular  Current Liquid Diet : Thin      Primary Complaint  Patient Complaint: weakness    Pain:  Pain Assessment  Pain Assessment: 0-10  Pain Level: 0  Patient's Stated Pain Goal: No pain    Reason for Referral  Rod Kyle was referred for a bedside swallow evaluation to assess the efficiency of her swallow function, identify signs and symptoms of aspiration and make recommendations regarding safe dietary consistencies, effective compensatory strategies, and safe eating environment. Impression  Dysphagia Diagnosis: Swallow function appears grossly intact  Dysphagia Outcome Severity Scale: Level 6: Within functional limits/Modified independence     Treatment Plan  Requires SLP Intervention: No  Duration/Frequency of Treatment: n/a  D/C Recommendations: To be determined       Recommended Diet and Intervention  Diet Solids Recommendation: Regular  Liquid Consistency Recommendation: Thin  Recommended Form of Meds: PO          Compensatory Swallowing Strategies  Compensatory Swallowing Strategies: Upright as possible for all oral intake; Eat/Feed slowly    Treatment/Goals  Short-term Goals  Timeframe for Short-term Goals: n/a    General  Chart Reviewed: Yes  Behavior/Cognition: Alert; Cooperative; Pleasant mood  Respiratory Status: O2 via nasual cannula  O2 Device: Nasal cannula  Communication Observation: Functional  Follows Directions: Complex  Dentition: Adequate; Dentures top; Dentures bottom  Patient Positioning: Upright in bed  Baseline Vocal Quality: Normal  Volitional Cough: Strong  Prior Dysphagia History: Pt denies hx of dysphagia  Consistencies Administered: Reg solid; Thin - straw; Thin - cup           Vision/Hearing  Vision  Vision: Within Functional Limits  Hearing  Hearing: Within functional limits    Oral Motor Deficits  Oral/Motor  Oral Motor:  Within functional limits    Oral Phase Dysfunction  Oral Phase  Oral Phase: WFL     Indicators of Pharyngeal Phase Dysfunction   Pharyngeal Phase  Pharyngeal Phase: WFL    Prognosis  Prognosis  Prognosis for safe diet advancement: good  Individuals consulted  Consulted and agree with results and recommendations: Patient; RN    Education  Patient Education: recommendations/POC  Patient Education Response: Verbalizes understanding  Safety Devices in place: Yes  Type of devices:  All fall risk precautions in place       Therapy Time  SLP Individual Minutes  Time In: 6570  Time Out: [address]  Minutes: Jasmin Figueroa Marquez 86, CCC-SLP, 12/2/2021

## 2021-12-02 NOTE — PROGRESS NOTES
Skin assessment completed with Mayco Tripathi. Skin is warm and dry. Patient has some scattered bruising and a scrap on her right forearm. Pulses are palpable.

## 2021-12-02 NOTE — PROGRESS NOTES
Hospitalist Addendum    Patient had been seen and examined in the morning in the presence of her daughter and sister. CT of the chest obtained on this admission had shown the following:    Masslike, spiculated opacity in the right infrahilar region.  While that could represent focal pneumonia, neoplasm is considered more likely.  Further evaluation with PET-CT and/or histopathologic correlation is recommended. There is bibasilar consolidation, concerning for pneumonia or aspiration. Scattered areas of airspace disease seen within the mid upper lungs, most compatible with additional pneumonia. Diffuse bronchial wall thickening.  Filling defects within the lower lung airways may reflect mucous plugging versus aspiration. I had wanted to discuss with patient and family with reference to the radiologist reading of the CTA and the need for further work-up after treatment for questionable pneumonia, however my discussion was not welcomed by both patient and family. Patient had been seen by pulmonology with a plan to treat pneumonia at the moment and obtain CT PET scan on the outpatient upon discharge. Will maintain patient on current regimen, involve PT/OT to assess the patient for their input and will obtain home O2 evaluation prior to discharge. Continue to the management as outlined.     Electronically signed by Anthony Cantu MD on 12/2/21 at 6:32 PM EST

## 2021-12-03 PROBLEM — J18.9 OBSTRUCTIVE PNEUMONIA: Status: ACTIVE | Noted: 2021-12-03

## 2021-12-03 PROBLEM — J96.01 ACUTE RESPIRATORY FAILURE WITH HYPOXIA (HCC): Status: ACTIVE | Noted: 2021-12-03

## 2021-12-03 PROBLEM — R91.8 OPACITY OF LUNG ON IMAGING STUDY: Status: ACTIVE | Noted: 2021-12-03

## 2021-12-03 PROBLEM — J18.9 OBSTRUCTIVE PNEUMONIA: Status: RESOLVED | Noted: 2021-12-03 | Resolved: 2021-12-03

## 2021-12-03 LAB
ALBUMIN SERPL-MCNC: 3.1 GM/DL (ref 3.4–5)
ALP BLD-CCNC: 117 IU/L (ref 40–128)
ALT SERPL-CCNC: 23 U/L (ref 10–40)
ANION GAP SERPL CALCULATED.3IONS-SCNC: 13 MMOL/L (ref 4–16)
AST SERPL-CCNC: 18 IU/L (ref 15–37)
BASOPHILS ABSOLUTE: 0 K/CU MM
BASOPHILS RELATIVE PERCENT: 0.1 % (ref 0–1)
BILIRUB SERPL-MCNC: 0.2 MG/DL (ref 0–1)
BUN BLDV-MCNC: 16 MG/DL (ref 6–23)
CALCIUM SERPL-MCNC: 8.6 MG/DL (ref 8.3–10.6)
CEA: 2.6 NG/ML
CHLORIDE BLD-SCNC: 95 MMOL/L (ref 99–110)
CO2: 29 MMOL/L (ref 21–32)
CREAT SERPL-MCNC: 0.6 MG/DL (ref 0.6–1.1)
DIFFERENTIAL TYPE: ABNORMAL
EOSINOPHILS ABSOLUTE: 0 K/CU MM
EOSINOPHILS RELATIVE PERCENT: 0 % (ref 0–3)
GFR AFRICAN AMERICAN: >60 ML/MIN/1.73M2
GFR NON-AFRICAN AMERICAN: >60 ML/MIN/1.73M2
GLUCOSE BLD-MCNC: 255 MG/DL (ref 70–99)
HCT VFR BLD CALC: 40.7 % (ref 37–47)
HEMOGLOBIN: 12.7 GM/DL (ref 12.5–16)
IMMATURE NEUTROPHIL %: 0.8 % (ref 0–0.43)
LYMPHOCYTES ABSOLUTE: 0.7 K/CU MM
LYMPHOCYTES RELATIVE PERCENT: 4.2 % (ref 24–44)
MAGNESIUM: 2.1 MG/DL (ref 1.8–2.4)
MCH RBC QN AUTO: 29.6 PG (ref 27–31)
MCHC RBC AUTO-ENTMCNC: 31.2 % (ref 32–36)
MCV RBC AUTO: 94.9 FL (ref 78–100)
MONOCYTES ABSOLUTE: 0.4 K/CU MM
MONOCYTES RELATIVE PERCENT: 2.5 % (ref 0–4)
NUCLEATED RBC %: 0 %
PDW BLD-RTO: 13.8 % (ref 11.7–14.9)
PHOSPHORUS: 3.8 MG/DL (ref 2.5–4.9)
PLATELET # BLD: 277 K/CU MM (ref 140–440)
PMV BLD AUTO: 10.1 FL (ref 7.5–11.1)
POTASSIUM SERPL-SCNC: 4.2 MMOL/L (ref 3.5–5.1)
PRO-BNP: 355.1 PG/ML
PROCALCITONIN: 0.12
RBC # BLD: 4.29 M/CU MM (ref 4.2–5.4)
SEGMENTED NEUTROPHILS ABSOLUTE COUNT: 15.3 K/CU MM
SEGMENTED NEUTROPHILS RELATIVE PERCENT: 92.4 % (ref 36–66)
SODIUM BLD-SCNC: 137 MMOL/L (ref 135–145)
TOTAL IMMATURE NEUTOROPHIL: 0.14 K/CU MM
TOTAL NUCLEATED RBC: 0 K/CU MM
TOTAL PROTEIN: 5.1 GM/DL (ref 6.4–8.2)
WBC # BLD: 16.6 K/CU MM (ref 4–10.5)

## 2021-12-03 PROCEDURE — 85025 COMPLETE CBC W/AUTO DIFF WBC: CPT

## 2021-12-03 PROCEDURE — 6360000002 HC RX W HCPCS: Performed by: NURSE PRACTITIONER

## 2021-12-03 PROCEDURE — 99223 1ST HOSP IP/OBS HIGH 75: CPT | Performed by: INTERNAL MEDICINE

## 2021-12-03 PROCEDURE — 83880 ASSAY OF NATRIURETIC PEPTIDE: CPT

## 2021-12-03 PROCEDURE — 2580000003 HC RX 258: Performed by: NURSE PRACTITIONER

## 2021-12-03 PROCEDURE — 6370000000 HC RX 637 (ALT 250 FOR IP): Performed by: INTERNAL MEDICINE

## 2021-12-03 PROCEDURE — 84145 PROCALCITONIN (PCT): CPT

## 2021-12-03 PROCEDURE — 94761 N-INVAS EAR/PLS OXIMETRY MLT: CPT

## 2021-12-03 PROCEDURE — 80053 COMPREHEN METABOLIC PANEL: CPT

## 2021-12-03 PROCEDURE — 6370000000 HC RX 637 (ALT 250 FOR IP): Performed by: NURSE PRACTITIONER

## 2021-12-03 PROCEDURE — 1200000000 HC SEMI PRIVATE

## 2021-12-03 PROCEDURE — 82378 CARCINOEMBRYONIC ANTIGEN: CPT

## 2021-12-03 PROCEDURE — 6360000002 HC RX W HCPCS: Performed by: INTERNAL MEDICINE

## 2021-12-03 PROCEDURE — 94640 AIRWAY INHALATION TREATMENT: CPT

## 2021-12-03 PROCEDURE — 84100 ASSAY OF PHOSPHORUS: CPT

## 2021-12-03 PROCEDURE — 36415 COLL VENOUS BLD VENIPUNCTURE: CPT

## 2021-12-03 PROCEDURE — 83735 ASSAY OF MAGNESIUM: CPT

## 2021-12-03 RX ORDER — METHYLPREDNISOLONE SODIUM SUCCINATE 40 MG/ML
40 INJECTION, POWDER, LYOPHILIZED, FOR SOLUTION INTRAMUSCULAR; INTRAVENOUS EVERY 8 HOURS
Status: DISCONTINUED | OUTPATIENT
Start: 2021-12-03 | End: 2021-12-06 | Stop reason: HOSPADM

## 2021-12-03 RX ADMIN — METHYLPREDNISOLONE SODIUM SUCCINATE 40 MG: 40 INJECTION, POWDER, FOR SOLUTION INTRAMUSCULAR; INTRAVENOUS at 22:57

## 2021-12-03 RX ADMIN — AZITHROMYCIN MONOHYDRATE 500 MG: 500 INJECTION, POWDER, LYOPHILIZED, FOR SOLUTION INTRAVENOUS at 18:19

## 2021-12-03 RX ADMIN — THEOPHYLLINE ANHYDROUS 100 MG: 100 CAPSULE, EXTENDED RELEASE ORAL at 21:04

## 2021-12-03 RX ADMIN — GUAIFENESIN 600 MG: 600 TABLET, EXTENDED RELEASE ORAL at 15:02

## 2021-12-03 RX ADMIN — Medication 10 MG: at 21:05

## 2021-12-03 RX ADMIN — GUAIFENESIN 600 MG: 600 TABLET, EXTENDED RELEASE ORAL at 21:04

## 2021-12-03 RX ADMIN — MONTELUKAST 10 MG: 10 TABLET, FILM COATED ORAL at 21:05

## 2021-12-03 RX ADMIN — METHYLPREDNISOLONE SODIUM SUCCINATE 40 MG: 40 INJECTION, POWDER, FOR SOLUTION INTRAMUSCULAR; INTRAVENOUS at 02:40

## 2021-12-03 RX ADMIN — CLONAZEPAM 1 MG: 1 TABLET ORAL at 10:57

## 2021-12-03 RX ADMIN — METHYLPREDNISOLONE SODIUM SUCCINATE 40 MG: 40 INJECTION, POWDER, FOR SOLUTION INTRAMUSCULAR; INTRAVENOUS at 15:00

## 2021-12-03 RX ADMIN — BUDESONIDE AND FORMOTEROL FUMARATE DIHYDRATE 2 PUFF: 160; 4.5 AEROSOL RESPIRATORY (INHALATION) at 12:54

## 2021-12-03 RX ADMIN — BUDESONIDE AND FORMOTEROL FUMARATE DIHYDRATE 2 PUFF: 160; 4.5 AEROSOL RESPIRATORY (INHALATION) at 22:19

## 2021-12-03 RX ADMIN — ALBUTEROL SULFATE 2 PUFF: 90 AEROSOL, METERED RESPIRATORY (INHALATION) at 15:32

## 2021-12-03 RX ADMIN — SODIUM CHLORIDE, PRESERVATIVE FREE 10 ML: 5 INJECTION INTRAVENOUS at 09:31

## 2021-12-03 RX ADMIN — ALBUTEROL SULFATE 2 PUFF: 90 AEROSOL, METERED RESPIRATORY (INHALATION) at 12:53

## 2021-12-03 RX ADMIN — ASPIRIN 81 MG: 81 TABLET, CHEWABLE ORAL at 09:23

## 2021-12-03 RX ADMIN — ENOXAPARIN SODIUM 40 MG: 40 INJECTION SUBCUTANEOUS at 09:23

## 2021-12-03 RX ADMIN — SODIUM CHLORIDE, PRESERVATIVE FREE 10 ML: 5 INJECTION INTRAVENOUS at 21:10

## 2021-12-03 RX ADMIN — ACETAMINOPHEN 650 MG: 325 TABLET ORAL at 21:09

## 2021-12-03 RX ADMIN — GUAIFENESIN 600 MG: 600 TABLET, EXTENDED RELEASE ORAL at 09:23

## 2021-12-03 RX ADMIN — ALBUTEROL SULFATE 2 PUFF: 90 AEROSOL, METERED RESPIRATORY (INHALATION) at 22:17

## 2021-12-03 RX ADMIN — CEFTRIAXONE 1000 MG: 1 INJECTION, POWDER, FOR SOLUTION INTRAMUSCULAR; INTRAVENOUS at 16:47

## 2021-12-03 RX ADMIN — THEOPHYLLINE ANHYDROUS 100 MG: 100 CAPSULE, EXTENDED RELEASE ORAL at 09:23

## 2021-12-03 RX ADMIN — METHYLPREDNISOLONE SODIUM SUCCINATE 40 MG: 40 INJECTION, POWDER, FOR SOLUTION INTRAMUSCULAR; INTRAVENOUS at 09:23

## 2021-12-03 RX ADMIN — Medication 2 PUFF: at 12:55

## 2021-12-03 RX ADMIN — PANTOPRAZOLE SODIUM 40 MG: 40 TABLET, DELAYED RELEASE ORAL at 05:46

## 2021-12-03 RX ADMIN — CLONAZEPAM 1 MG: 1 TABLET ORAL at 21:05

## 2021-12-03 RX ADMIN — Medication 2 PUFF: at 15:35

## 2021-12-03 RX ADMIN — Medication 2 PUFF: at 22:18

## 2021-12-03 ASSESSMENT — PAIN SCALES - GENERAL
PAINLEVEL_OUTOF10: 6
PAINLEVEL_OUTOF10: 0
PAINLEVEL_OUTOF10: 6
PAINLEVEL_OUTOF10: 0

## 2021-12-03 ASSESSMENT — PAIN DESCRIPTION - LOCATION: LOCATION: HEAD

## 2021-12-03 ASSESSMENT — PAIN DESCRIPTION - DESCRIPTORS: DESCRIPTORS: HEADACHE

## 2021-12-03 NOTE — PROGRESS NOTES
Pulmonary and Critical Care  Progress Note    Subjective: The patient has improved  Shortness of breath has improved  Chest pain none  Addressing respiratory complaints Patient is negative for  hemoptysis and cyanosis  CONSTITUTIONAL:  negative for fevers and chills      Past Medical History:     has a past medical history of Former smoker, Hypertension, Moderate COPD (chronic obstructive pulmonary disease) ([facility] and Other emphysema ([facility] has a past surgical history that includes Hysterectomy; Cholecystectomy; Abdomen surgery; other surgical history (07/16/2018); and Breast surgery. reports that she quit smoking about 3 years ago. Her smoking use included cigarettes. She has a 25.00 pack-year smoking history. She has never used smokeless tobacco. She reports that she does not drink alcohol and does not use drugs. Family history:  family history includes Cancer in her father; Diabetes in her brother and mother; Heart Disease in her mother. Allergies   Allergen Reactions    Pravastatin Sodium Other (See Comments)     Myalgias (note from 5/15/18)     Social History:    Reviewed; no changes    Objective:   PHYSICAL EXAM:        VITALS:  BP (!) 139/92   Pulse 105   Temp 98.1 °F (36.7 °C) (Oral)   Resp 16   Ht 5' 3\" (1.6 m)   Wt 88 lb 13.5 oz (40.3 kg)   SpO2 94%   BMI 15.74 kg/m²     24HR INTAKE/OUTPUT:      Intake/Output Summary (Last 24 hours) at 12/3/2021 1137  Last data filed at 12/2/2021 2247  Gross per 24 hour   Intake 369.12 ml   Output --   Net 369.12 ml       CONSTITUTIONAL:  awake, alert, cooperative, no apparent distress, and appears stated age  LUNGS:  decreased breath sounds  CARDIOVASCULAR:  normal S1 and S2 and negative JVD  ABD:Abdomen soft, non-tender. BS normal. No masses,  No organomegaly  NEURO:Alert and oriented x3. Gait normal. Reflexes and motor strength normal and symmetric. Cranial nerves 2-12 and sensation grossly intact.   DATA:    CBC:  Recent Labs     12/01/21  1355 12/02/21  0845 12/03/21  0441   WBC 13.6* 13.4* 16.6*   RBC 4.82 4.79 4.29   HGB 14.0 14.3 12.7   HCT 44.5 44.9 40.7    251 277   MCV 92.3 93.7 94.9   MCH 29.0 29.9 29.6   MCHC 31.5* 31.8* 31.2*   RDW 14.4 13.7 13.8   SEGSPCT 71.2* 89.8* 92.4*      BMP:  Recent Labs     12/01/21  1355 12/02/21  0845 12/03/21  0441    140 137   K 4.0 4.6 4.2   CL 96* 97* 95*   CO2 34* 29 29   BUN 14 15 16   CREATININE 0.5* 0.7 0.6   CALCIUM 9.3 8.9 8.6   GLUCOSE 99 152* 255*      ABG:  No results for input(s): PH, PO2ART, CIV6JVX, HCO3, BEART, O2SAT in the last 72 hours. Lab Results   Component Value Date    PROBNP 355.1 (H) 12/03/2021    PROBNP 153.3 12/01/2021    PROBNP 292.0 11/23/2021    THEOPH 5.0 (L) 03/22/2020     No results found for: [facility]    Radiology Review:  Pertinent images / reports were reviewed as a part of this visit. Assessment:     Patient Active Problem List   Diagnosis    Essential hypertension    Mixed hyperlipidemia    Anxiety    Primary insomnia    Lung nodule    Other emphysema ([address]    Former smoker    Chronic obstructive pulmonary disease ([address]    Vaginal dryness    H/O total hysterectomy    Vaginal atrophy    Leg cramping    Bowel obstruction (HCC)    SBO (small bowel obstruction) (HCC)    Adynamic ileus ([address]    Tobacco abuse    Lower abdominal pain    Nausea    Abnormal CT of the abdomen    Ileus ([address]    Gastroesophageal reflux disease without esophagitis    Anxiety and depression    Poor appetite    Acute on chronic respiratory failure with hypoxia (HCC)    Anemia    PNA (pneumonia)       Plan:   1. Overall the patient has improved. 2. Inc. activity. 3. PET scan as outpt. 4. Taper steroids.   Fran Kerr MD  12/3/2021  11:37 AM

## 2021-12-03 NOTE — PROGRESS NOTES
[id number]    OBJECTIVE:    CBC:   Recent Labs     12/01/21  1355 12/02/21  0845 12/03/21  0441   WBC 13.6* 13.4* 16.6*   HGB 14.0 14.3 12.7    251 277     BMP:    Recent Labs     12/01/21  1355 12/02/21  0845 12/03/21  0441    140 137   K 4.0 4.6 4.2   CL 96* 97* 95*   CO2 34* 29 29   BUN 14 15 16   CREATININE 0.5* 0.7 0.6   GLUCOSE 99 152* 255*     Calcium:  Recent Labs     12/03/21  0441   CALCIUM 8.6     Ionized Calcium:No results for input(s): IONCA in the last 72 hours. Magnesium:  Recent Labs     12/03/21 0441   MG 2.1     Phosphorus:  Recent Labs     12/03/21 0441   PHOS 3.8     BNP:No results for input(s): BNP in the last 72 hours. Glucose:No results for input(s): POCGLU in the last 72 hours. HgbA1C: No results for input(s): LABA1C in the last 72 hours. INR: No results for input(s): INR in the last 72 hours. Hepatic:   Recent Labs     12/03/21 0441   ALKPHOS 117   ALT 23   AST 18   PROT 5.1*   BILITOT 0.2   LABALBU 3.1*     Amylase and Lipase:No results for input(s): LACTA, AMYLASE in the last 72 hours. Lactic Acid: No results for input(s): LACTA in the last 72 hours. Troponin:   Recent Labs     12/01/21  1355   TROPONINT <0.010     BNP: No results for input(s): BNP in the last 72 hours. Lipids: No results for input(s): CHOL, TRIG, HDL, LDLCALC in the last 72 hours. Invalid input(s): LDL  ABGs: No results found for: PH, PCO2, PO2, HCO3, O2SAT    Radiology reports as per the Radiologist  Radiology: CTA PULMONARY W CONTRAST    Result Date: 12/1/2021  EXAMINATION: CTA OF THE CHEST 12/1/2021 12:13 pm TECHNIQUE: CTA of the chest was performed after the administration of intravenous contrast.  Multiplanar reformatted images are provided for review. MIP images are provided for review. Dose modulation, iterative reconstruction, and/or weight based adjustment of the mA/kV was utilized to reduce the radiation dose to as low as reasonably achievable.  COMPARISON: Chest CT, 03/18/2020 HISTORY: ORDERING SYSTEM PROVIDED HISTORY: Shortness of breath TECHNOLOGIST PROVIDED HISTORY: Reason for exam:->Shortness of breath Decision Support Exception - unselect if not a suspected or confirmed emergency medical condition->Emergency Medical Condition (MA) Reason for Exam: Shortness of breath Acuity: Acute Type of Exam: Initial Additional signs and symptoms: productive cough Relevant Medical/Surgical History: Hx Breast surg, Michelle, COPD / 55cc [id number] FINDINGS: Pulmonary Arteries: The pulmonary arteries are adequately opacified. No filling defects are identified within the pulmonary arteries to suggest pulmonary embolism. The Mediastinum: Visualized thyroid unremarkable. Mildly enlarged bilateral hilar lymph nodes are seen. For example, on the right, lymph node measures 1.4 x 0.9 cm (series 4, image 58). On the left, lymph node measures 1.5 x 0.9 cm (series 4, image 53). Esophagus is patulous but otherwise unremarkable. Cardiac chambers are enlarged. No pericardial effusion. Thoracic aorta is normal in caliber without evidence of dissection. Lungs/pleura: There has a new masslike spiculated opacity seen in the right infrahilar region, enveloping the bronchovascular bundle. Consolidation is seen within the right lower lobe more inferiorly. Additional consolidation is identified within the left lower lobe. There is diffuse bronchial wall thickening. Filling defects are identified within the lower lung airways bilaterally, especially on the left. Scattered areas of airspace disease are seen within the mid upper lungs bilaterally. Lobulated nodule within the right middle lobe is unchanged measuring approximately 7 mm (series 3, image 78). This has been is present since at least 2017 and is considered benign. All these findings are seen on a background of emphysema. Small bilateral pleural effusions are present. Upper Abdomen: Unremarkable. Soft Tissues/Bones: Bilateral breast prostheses noted. Visualized extra thoracic soft tissues otherwise unremarkable. No acute or suspicious bony abnormalities are detected. No evidence of pulmonary embolism or aortic dissection. Masslike, spiculated opacity in the right infrahilar region. While that could represent focal pneumonia, neoplasm is considered more likely. Further evaluation with PET-CT and/or histopathologic correlation is recommended. There is bibasilar consolidation, concerning for pneumonia or aspiration. Scattered areas of airspace disease seen within the mid upper lungs, most compatible with additional pneumonia. Diffuse bronchial wall thickening. Filling defects within the lower lung airways may reflect mucous plugging versus aspiration. Physical Exam:  Vitals: BP (!) 149/75   Pulse 96   Temp 98.6 °F (37 °C) (Oral)   Resp 18   Ht 5' 3\" (1.6 m)   Wt 88 lb 13.5 oz (40.3 kg)   SpO2 92%   BMI 15.74 kg/m²   24 hour intake/output:    Intake/Output Summary (Last 24 hours) at 12/3/2021 1552  Last data filed at 12/2/2021 2247  Gross per 24 hour   Intake 369.12 ml   Output --   Net 369.12 ml     Last 3 weights:   Wt Readings from Last 3 Encounters:   12/03/21 88 lb 13.5 oz (40.3 kg)   11/23/21 84 lb 6.4 oz (38.3 kg)   11/01/21 84 lb 6.4 oz (38.3 kg)       General appearance - oriented to person, place, and time, dehydrated, in mild to moderate distress, chronically ill appearing and cachexia  HEENT: Normocephalic, Atraumatic, Conjuctiva pink, PERRL, Oral mucosa normal, Lips, teeth and gums normal, Trachea midline, Thyroid normal and No noted lymphadenopathy  Chest -coarse bibasilar crackles with isolated wheezes  Cardiovascular - normal rate, regular rhythm, normal S1, S2, no murmurs, rubs, clicks or gallops  Abdomen - soft, nontender, nondistended, no masses or organomegaly   Neurological - Alert and oriented, Normal speech, No focal findings or movement disorder noted and Motor and sensory grossly normal bilaterally  Integumentary - Skin color, texture, turgor normal. No Rashes or lesions  Musculoskeletal -Full ROM times 4 extremities, No clubbing or cyanosis and No peripheral edema      DVT prophylaxis: [x] Lovenox                                 [] SCDs                                 [] SQ Heparin                                 [] Encourage ambulation           [] Already on Anticoagulation                 ASSESSMENT / PLAN :    Principal Problem:    PNA (pneumonia)  CT of the chest on admission showed the following: Masslike, spiculated opacity in the right infrahilar region.  While that could represent focal pneumonia, neoplasm is considered more likely.  Further evaluation with PET-CT and/or histopathologic correlation is recommended. Patient at the moment is being managed for pneumonia and COPD exacerbation, will be following up with pulmonology upon discharge for PET scan and further recommendations. Active Problems:    Essential hypertension  Currently off all antihypertensives. We will continue to monitor blood pressure and manage closely when indicated      Mixed hyperlipidemia  Currently not on any statin      Chronic obstructive pulmonary disease ([facility] [address]  Pulmonologist following with further recommendations. Continue with breathing treatments, Mucinex, Solu-Medrol and empiric IV antibiotics      Acute respiratory failure with hypoxia (HCC)  Oxygen supplementation via nasal cannula, breathing treatment, further management as indicated above. Opacity of lung on imaging study  CTA of the lungs obtained on admission showing: Masslike, spiculated opacity in the right infrahilar region.  While that could represent focal pneumonia, neoplasm is considered more likely.  Further evaluation with PET-CT and/or histopathologic correlation is recommended. Further work-up to be obtained upon discharge and follow-up. Resolved Problems:    * No resolved hospital problems.  *    Maintain in-house most probably over the weekend, continue with management as outlined, PT/OT evaluation with further recommendations. Will obtain home O2 evaluation prior to discharge. A.m. labs.     Electronically signed by Marvin Lino MD on 12/3/2021 at 3:52 PM    [address] [place] [address]

## 2021-12-03 NOTE — CONSULTS
ONCOLOGY HEMATOLOGY CARE ([facility])  CONSULTATION REPORT    REASON FOR CONSULT    Right infrahilar mass like infiltrate    CHIEF COMPLAINT    Chief Complaint   Patient presents with    Shortness of Breath    Cough       HISTORY OF PRESENT ILLNESS   Sanjana Ruiz is a 67 y.o. female who presents with worsening shortness of breath and productive cough with associated functional decline. She reports recent weight loss, no hemoptysis, bone pain. CTA pulmonary 21  Impression   No evidence of pulmonary embolism or aortic dissection. Masslike, spiculated opacity in the right infrahilar region. While that   could represent focal pneumonia, neoplasm is considered more likely. Further   evaluation with PET-CT and/or histopathologic correlation is recommended. There is bibasilar consolidation, concerning for pneumonia or aspiration. Scattered areas of airspace disease seen within the mid upper lungs, most   compatible with additional pneumonia. Diffuse bronchial wall thickening. Filling defects within the lower lung   airways may reflect mucous plugging versus aspiration. She is a patient of Dr. Martinez Manny  Has smoked 1 pack in four days for 50 years. Recently quit smoking. Two children  Father had cancer and  in early 's, she is unaware of what type  She had EGD every 2 years per Dr. Raquel Lo for reported stomach infeciton.   Has had colonoscopy within past five years which she has due to colon polyps    Du.e to mass like consolidation we were called to evaluate    PAST MEDICAL HISTORY    Past Medical History:   Diagnosis Date    Former smoker 2018    Hypertension     Moderate COPD (chronic obstructive pulmonary disease) ([address] 2018    Other emphysema ([address] 2018       SURGICAL HISTORY    Past Surgical History:   Procedure Laterality Date    ABDOMEN SURGERY      BREAST SURGERY      CHOLECYSTECTOMY      HYSTERECTOMY      OTHER SURGICAL HISTORY  2018    exp lap with TUNG       FAMILY HISTORY    Family History   Problem Relation Age of Onset    Heart Disease Mother     Diabetes Mother     Cancer Father     Diabetes Brother        SOCIAL HISTORY    Social History     Socioeconomic History    Marital status:      Spouse name: None    Number of children: None    Years of education: None    Highest education level: None   Occupational History    None   Tobacco Use    Smoking status: Former Smoker     Packs/day: 0.50     Years: 50.00     Pack years: 25.00     Types: Cigarettes     Quit date: 11/19/2018     Years since quitting: 3.0    Smokeless tobacco: Never Used    Tobacco comment: Off and on smoker pt states. Vaping Use    Vaping Use: Never used   Substance and Sexual Activity    Alcohol use: No    Drug use: No    Sexual activity: Not Currently   Other Topics Concern    None   Social History Narrative    None     Social Determinants of Health     Financial Resource Strain:     Difficulty of Paying Living Expenses: Not on file   Food Insecurity:     Worried About Running Out of Food in the Last Year: Not on file    Allyssa of Food in the Last Year: Not on file   Transportation Needs:     Lack of Transportation (Medical): Not on file    Lack of Transportation (Non-Medical):  Not on file   Physical Activity:     Days of Exercise per Week: Not on file    Minutes of Exercise per Session: Not on file   Stress:     Feeling of Stress : Not on file   Social Connections:     Frequency of Communication with Friends and Family: Not on file    Frequency of Social Gatherings with Friends and Family: Not on file    Attends Muslim Services: Not on file    Active Member of Clubs or Organizations: Not on file    Attends Club or Organization Meetings: Not on file    Marital Status: Not on file   Intimate Partner Violence:     Fear of Current or Ex-Partner: Not on file    Emotionally Abused: Not on file    Physically Abused: Not on file   Keith Sexually Abused: Not on file   Housing Stability:     Unable to Pay for Housing in the Last Year: Not on file    Number of Joes in the Last Year: Not on file    Unstable Housing in the Last Year: Not on file       REVIEW OF SYSTEMS    Constitutional:  Denies fever, chills, loss of appetite, + weight loss, tiredness, + fatigue or weakness   HEENT:  Denies swelling of neck glands  Respiratory:  +cough, shortness of breath no hemoptysis  Cardiovascular:  Denies chest pain, palpitations or swelling   GI:  Denies abdominal pain, nausea, vomiting, constipation or diarrhea   Musculoskeletal:  Denies back pain   Skin:  Denies rash   Neurologic:  Denies headache, focal weakness or sensory changes   All systems negative except as marked. PHYSICAL EXAM    Vitals: BP (!) 144/82   Pulse 83   Temp 98.1 °F (36.7 °C) (Oral)   Resp 16   Ht 5' 3\" (1.6 m)   Wt 88 lb 13.5 oz (40.3 kg)   SpO2 94%   BMI 15.74 kg/m²   CONSTITUTIONAL: awake, alert, cooperative, no apparent distress   EYES:  sclera clear and conjunctiva normal  ENT: Normocephalic, without obvious abnormality, atraumatic  NECK: supple, symmetrical  HEMATOLOGIC/LYMPHATIC: no cervical, supraclavicular or axillary lymphadenopathy   LUNGS: + rhonchi, no increased work of breathing and clear to auscultation   CARDIOVASCULAR: regular rate and rhythm, normal S1 and S2, no murmur noted  ABDOMEN: normal bowel sounds x 4, soft, non-distended, non-tender, no masses palpated, no hepatosplenomgaly   MUSCULOSKELETAL: full range of motion noted, tone is normal  NEUROLOGIC: awake, alert, oriented to name, place and time. Motor skills grossly intact. SKIN: Normal skin color, texture, turgor and no jaundice.  Skin appears intact   EXTREMITIES: no LE edema    LABORATORY RESULTS  CBC:   Recent Labs     12/01/21  1355 12/02/21  0845 12/03/21  0441   WBC 13.6* 13.4* 16.6*   HGB 14.0 14.3 12.7    251 277     BMP:    Recent Labs     12/01/21  1355 12/02/21  0845 12/03/21  0441

## 2021-12-04 ENCOUNTER — APPOINTMENT (OUTPATIENT)
Dept: GENERAL RADIOLOGY | Age: 76
DRG: 193 | End: 2021-12-04
Payer: MEDICARE

## 2021-12-04 LAB
CULTURE: NORMAL
Lab: NORMAL
SPECIMEN: NORMAL

## 2021-12-04 PROCEDURE — 6360000002 HC RX W HCPCS: Performed by: NURSE PRACTITIONER

## 2021-12-04 PROCEDURE — 6370000000 HC RX 637 (ALT 250 FOR IP): Performed by: INTERNAL MEDICINE

## 2021-12-04 PROCEDURE — 1200000000 HC SEMI PRIVATE

## 2021-12-04 PROCEDURE — 6370000000 HC RX 637 (ALT 250 FOR IP): Performed by: NURSE PRACTITIONER

## 2021-12-04 PROCEDURE — 71046 X-RAY EXAM CHEST 2 VIEWS: CPT

## 2021-12-04 PROCEDURE — 2700000000 HC OXYGEN THERAPY PER DAY

## 2021-12-04 PROCEDURE — 94761 N-INVAS EAR/PLS OXIMETRY MLT: CPT

## 2021-12-04 PROCEDURE — 2580000003 HC RX 258: Performed by: NURSE PRACTITIONER

## 2021-12-04 PROCEDURE — 6360000002 HC RX W HCPCS: Performed by: INTERNAL MEDICINE

## 2021-12-04 PROCEDURE — 94640 AIRWAY INHALATION TREATMENT: CPT

## 2021-12-04 RX ADMIN — Medication 10 MG: at 20:25

## 2021-12-04 RX ADMIN — ACETAMINOPHEN 650 MG: 325 TABLET ORAL at 20:25

## 2021-12-04 RX ADMIN — BUDESONIDE AND FORMOTEROL FUMARATE DIHYDRATE 2 PUFF: 160; 4.5 AEROSOL RESPIRATORY (INHALATION) at 21:49

## 2021-12-04 RX ADMIN — GUAIFENESIN 600 MG: 600 TABLET, EXTENDED RELEASE ORAL at 20:25

## 2021-12-04 RX ADMIN — Medication 2 PUFF: at 07:16

## 2021-12-04 RX ADMIN — ALBUTEROL SULFATE 2 PUFF: 90 AEROSOL, METERED RESPIRATORY (INHALATION) at 11:11

## 2021-12-04 RX ADMIN — CLONAZEPAM 1 MG: 1 TABLET ORAL at 20:28

## 2021-12-04 RX ADMIN — METHYLPREDNISOLONE SODIUM SUCCINATE 40 MG: 40 INJECTION, POWDER, FOR SOLUTION INTRAMUSCULAR; INTRAVENOUS at 14:54

## 2021-12-04 RX ADMIN — THEOPHYLLINE ANHYDROUS 100 MG: 100 CAPSULE, EXTENDED RELEASE ORAL at 08:45

## 2021-12-04 RX ADMIN — AZITHROMYCIN MONOHYDRATE 500 MG: 500 INJECTION, POWDER, LYOPHILIZED, FOR SOLUTION INTRAVENOUS at 18:51

## 2021-12-04 RX ADMIN — BUDESONIDE AND FORMOTEROL FUMARATE DIHYDRATE 2 PUFF: 160; 4.5 AEROSOL RESPIRATORY (INHALATION) at 07:17

## 2021-12-04 RX ADMIN — Medication 2 PUFF: at 11:11

## 2021-12-04 RX ADMIN — ASPIRIN 81 MG: 81 TABLET, CHEWABLE ORAL at 08:45

## 2021-12-04 RX ADMIN — ALBUTEROL SULFATE 2 PUFF: 90 AEROSOL, METERED RESPIRATORY (INHALATION) at 07:17

## 2021-12-04 RX ADMIN — PANTOPRAZOLE SODIUM 40 MG: 40 TABLET, DELAYED RELEASE ORAL at 06:23

## 2021-12-04 RX ADMIN — ALBUTEROL SULFATE 2 PUFF: 90 AEROSOL, METERED RESPIRATORY (INHALATION) at 21:48

## 2021-12-04 RX ADMIN — ENOXAPARIN SODIUM 40 MG: 40 INJECTION SUBCUTANEOUS at 08:46

## 2021-12-04 RX ADMIN — GUAIFENESIN 600 MG: 600 TABLET, EXTENDED RELEASE ORAL at 08:45

## 2021-12-04 RX ADMIN — CEFTRIAXONE 1000 MG: 1 INJECTION, POWDER, FOR SOLUTION INTRAMUSCULAR; INTRAVENOUS at 17:52

## 2021-12-04 RX ADMIN — SODIUM CHLORIDE, PRESERVATIVE FREE 10 ML: 5 INJECTION INTRAVENOUS at 20:26

## 2021-12-04 RX ADMIN — GUAIFENESIN 600 MG: 600 TABLET, EXTENDED RELEASE ORAL at 14:54

## 2021-12-04 RX ADMIN — METHYLPREDNISOLONE SODIUM SUCCINATE 40 MG: 40 INJECTION, POWDER, FOR SOLUTION INTRAMUSCULAR; INTRAVENOUS at 22:40

## 2021-12-04 RX ADMIN — METHYLPREDNISOLONE SODIUM SUCCINATE 40 MG: 40 INJECTION, POWDER, FOR SOLUTION INTRAMUSCULAR; INTRAVENOUS at 06:23

## 2021-12-04 RX ADMIN — CLONAZEPAM 1 MG: 1 TABLET ORAL at 08:45

## 2021-12-04 RX ADMIN — MONTELUKAST 10 MG: 10 TABLET, FILM COATED ORAL at 20:25

## 2021-12-04 RX ADMIN — THEOPHYLLINE ANHYDROUS 100 MG: 100 CAPSULE, EXTENDED RELEASE ORAL at 20:25

## 2021-12-04 RX ADMIN — Medication 2 PUFF: at 15:07

## 2021-12-04 RX ADMIN — ALBUTEROL SULFATE 2 PUFF: 90 AEROSOL, METERED RESPIRATORY (INHALATION) at 15:07

## 2021-12-04 RX ADMIN — Medication 2 PUFF: at 21:49

## 2021-12-04 RX ADMIN — SODIUM CHLORIDE, PRESERVATIVE FREE 10 ML: 5 INJECTION INTRAVENOUS at 08:46

## 2021-12-04 ASSESSMENT — PAIN SCALES - GENERAL
PAINLEVEL_OUTOF10: 5
PAINLEVEL_OUTOF10: 5
PAINLEVEL_OUTOF10: 0

## 2021-12-04 NOTE — PROGRESS NOTES
Pulmonary and Critical Care  Progress Note    Subjective: The patient is better. Shortness of breath has improved. Chest pain none  Addressing respiratory complaints Patient is negative for  hemoptysis and cyanosis  CONSTITUTIONAL:  negative for fevers and chills      Past Medical History:     has a past medical history of Former smoker, Hypertension, Moderate COPD (chronic obstructive pulmonary disease) ([address] and Other emphysema ([address] has a past surgical history that includes Hysterectomy; Cholecystectomy; Abdomen surgery; other surgical history (07/16/2018); and Breast surgery. reports that she quit smoking about 3 years ago. Her smoking use included cigarettes. She has a 25.00 pack-year smoking history. She has never used smokeless tobacco. She reports that she does not drink alcohol and does not use drugs. Family history:  family history includes Cancer in her father; Diabetes in her brother and mother; Heart Disease in her mother. Allergies   Allergen Reactions    Pravastatin Sodium Other (See Comments)     Myalgias (note from 5/15/18)     Social History:    Reviewed; no changes    Objective:   PHYSICAL EXAM:        VITALS:  BP (!) 144/65   Pulse 82   Temp 98.4 °F (36.9 °C) (Oral)   Resp 18   Ht 5' 3\" (1.6 m)   Wt 90 lb 6.2 oz (41 kg)   SpO2 95%   BMI 16.01 kg/m²     24HR INTAKE/OUTPUT:      Intake/Output Summary (Last 24 hours) at 12/4/2021 1147  Last data filed at 12/4/2021 0800  Gross per 24 hour   Intake 240 ml   Output --   Net 240 ml       CONSTITUTIONAL:  awake, alert, cooperative. LUNGS:  decreased breath sounds  CARDIOVASCULAR:  normal S1 and S2 and negative JVD  ABD:Abdomen soft, non-tender. BS normal. No masses,  No organomegaly  NEURO:Alert and oriented x3. Gait normal. Reflexes and motor strength normal and symmetric. Cranial nerves 2-12 and sensation grossly intact.   DATA:    CBC:  Recent Labs     12/01/21  1355 12/02/21  0845 12/03/21  0441   WBC 13.6* 13.4* 16.6*   RBC 4. 82 4.79 4.29   HGB 14.0 14.3 12.7   HCT 44.5 44.9 40.7    251 277   MCV 92.3 93.7 94.9   MCH 29.0 29.9 29.6   MCHC 31.5* 31.8* 31.2*   RDW 14.4 13.7 13.8   SEGSPCT 71.2* 89.8* 92.4*      BMP:  Recent Labs     12/01/21  1355 12/02/21  0845 12/03/21  0441    140 137   K 4.0 4.6 4.2   CL 96* 97* 95*   CO2 34* 29 29   BUN 14 15 16   CREATININE 0.5* 0.7 0.6   CALCIUM 9.3 8.9 8.6   GLUCOSE 99 152* 255*      ABG:  No results for input(s): PH, PO2ART, GCS8SZT, HCO3, BEART, O2SAT in the last 72 hours. Lab Results   Component Value Date    PROBNP 355.1 (H) 12/03/2021    PROBNP 153.3 12/01/2021    PROBNP 292.0 11/23/2021    THEOPH 5.0 (L) 03/22/2020     No results found for: [facility]    Radiology Review:  Pertinent images / reports were reviewed as a part of this visit. Assessment:     Patient Active Problem List   Diagnosis    Essential hypertension    Mixed hyperlipidemia    Anxiety    Primary insomnia    Lung nodule    Other emphysema ([address]    Former smoker    Chronic obstructive pulmonary disease ([address]    Vaginal dryness    H/O total hysterectomy    Vaginal atrophy    Leg cramping    Bowel obstruction (HCC)    SBO (small bowel obstruction) (HCC)    Adynamic ileus (HCC)    Tobacco abuse    Lower abdominal pain    Nausea    Abnormal CT of the abdomen    Ileus ([address]    Gastroesophageal reflux disease without esophagitis    Anxiety and depression    Poor appetite    Acute on chronic respiratory failure with hypoxia (HCC)    Anemia    PNA (pneumonia)    Acute respiratory failure with hypoxia (HCC)    Opacity of lung on imaging study       Plan:   1. Overall the patient has improved. 2. Inc. activity. 3. PET scan as outpt. 4. Repeat CXR.   Nataliia Matthew MD  12/4/2021  11:47 AM

## 2021-12-04 NOTE — PROGRESS NOTES
ATTENDING PHYSICIAN'S PROGRESS NOTES    Patient:  Carla Silva      Unit/Bed:1125/1125-A    YOB: 1945    MRN: [id number]     Acct: [de-identified]     Admit date: 12/1/2021    Patient Seen, Chart, Consults notes, Labs, Radiology studies reviewed. SUBJECTIVE:   Day 3 of stay with COPD exacerbation due to pneumonic process and most recent (in last 24 hours) has had minimal improvement in her symptoms. Respiratory panel positive for rhinovirus/enterovirus PCR. CT of the chest with right hilar spiculated masslike opacification. Patient has been seen by pulmonologist with recommendations noted. All other ROS negative except noted in HPI    Past, Family, Social History unchanged from admission. Diet:  ADULT DIET;  Regular  ADULT ORAL NUTRITION SUPPLEMENT; Breakfast, Lunch; Standard High Calorie/High Protein Oral Supplement    Medications:  Scheduled Meds:   methylPREDNISolone  40 mg IntraVENous Q8H    aspirin  81 mg Oral Daily    melatonin  10 mg Oral Nightly    montelukast  10 mg Oral Nightly    sodium chloride flush  5-40 mL IntraVENous 2 times per day    enoxaparin  40 mg SubCUTAneous Daily    cefTRIAXone (ROCEPHIN) IV  1,000 mg IntraVENous Q24H    azithromycin  500 mg IntraVENous Q24H    budesonide-formoterol  2 puff Inhalation BID    pantoprazole  40 mg Oral QAM AC    albuterol sulfate HFA  2 puff Inhalation 4x daily    ipratropium  2 puff Inhalation 4x daily    theophylline  100 mg Oral BID    guaiFENesin  600 mg Oral TID     Continuous Infusions:   sodium chloride Stopped (12/02/21 2049)     PRN Meds:albuterol sulfate HFA, sodium chloride, tiZANidine, traZODone, sodium chloride flush, sodium chloride, ondansetron **OR** ondansetron, polyethylene glycol, acetaminophen **OR** acetaminophen, benzonatate, clonazePAM    OBJECTIVE:    CBC:   Recent Labs     12/01/21  1355 12/02/21  0845 12/03/21  0441   WBC 13.6* 13.4* 16.6*   HGB 14.0 14.3 12.7    251 277     BMP: Recent Labs     12/01/21  1355 12/02/21  0845 12/03/21  0441    140 137   K 4.0 4.6 4.2   CL 96* 97* 95*   CO2 34* 29 29   BUN 14 15 16   CREATININE 0.5* 0.7 0.6   GLUCOSE 99 152* 255*     Calcium:  Recent Labs     12/03/21  0441   CALCIUM 8.6     Ionized Calcium:No results for input(s): IONCA in the last 72 hours. Magnesium:  Recent Labs     12/03/21  0441   MG 2.1     Phosphorus:  Recent Labs     12/03/21  0441   PHOS 3.8     BNP:No results for input(s): BNP in the last 72 hours. Glucose:No results for input(s): POCGLU in the last 72 hours. HgbA1C: No results for input(s): LABA1C in the last 72 hours. INR: No results for input(s): INR in the last 72 hours. Hepatic:   Recent Labs     12/03/21 0441   ALKPHOS 117   ALT 23   AST 18   PROT 5.1*   BILITOT 0.2   LABALBU 3.1*     Amylase and Lipase:No results for input(s): LACTA, AMYLASE in the last 72 hours. Lactic Acid: No results for input(s): LACTA in the last 72 hours. Troponin:   Recent Labs     12/01/21  1355   TROPONINT <0.010     BNP: No results for input(s): BNP in the last 72 hours. Lipids: No results for input(s): CHOL, TRIG, HDL, LDLCALC in the last 72 hours. Invalid input(s): LDL  ABGs: No results found for: PH, PCO2, PO2, HCO3, O2SAT    Radiology reports as per the Radiologist  Radiology: CTA PULMONARY W CONTRAST    Result Date: 12/1/2021  EXAMINATION: CTA OF THE CHEST 12/1/2021 12:13 pm TECHNIQUE: CTA of the chest was performed after the administration of intravenous contrast.  Multiplanar reformatted images are provided for review. MIP images are provided for review. Dose modulation, iterative reconstruction, and/or weight based adjustment of the mA/kV was utilized to reduce the radiation dose to as low as reasonably achievable.  COMPARISON: Chest CT, 03/18/2020 HISTORY: ORDERING SYSTEM PROVIDED HISTORY: Shortness of breath TECHNOLOGIST PROVIDED HISTORY: Reason for exam:->Shortness of breath Decision Support Exception - unselect if not a suspected or confirmed emergency medical condition->Emergency Medical Condition (MA) Reason for Exam: Shortness of breath Acuity: Acute Type of Exam: Initial Additional signs and symptoms: productive cough Relevant Medical/Surgical History: Hx Breast surg, Michelle, COPD / 55cc [id number] FINDINGS: Pulmonary Arteries: The pulmonary arteries are adequately opacified. No filling defects are identified within the pulmonary arteries to suggest pulmonary embolism. The Mediastinum: Visualized thyroid unremarkable. Mildly enlarged bilateral hilar lymph nodes are seen. For example, on the right, lymph node measures 1.4 x 0.9 cm (series 4, image 58). On the left, lymph node measures 1.5 x 0.9 cm (series 4, image 53). Esophagus is patulous but otherwise unremarkable. Cardiac chambers are enlarged. No pericardial effusion. Thoracic aorta is normal in caliber without evidence of dissection. Lungs/pleura: There has a new masslike spiculated opacity seen in the right infrahilar region, enveloping the bronchovascular bundle. Consolidation is seen within the right lower lobe more inferiorly. Additional consolidation is identified within the left lower lobe. There is diffuse bronchial wall thickening. Filling defects are identified within the lower lung airways bilaterally, especially on the left. Scattered areas of airspace disease are seen within the mid upper lungs bilaterally. Lobulated nodule within the right middle lobe is unchanged measuring approximately 7 mm (series 3, image 78). This has been is present since at least 2017 and is considered benign. All these findings are seen on a background of emphysema. Small bilateral pleural effusions are present. Upper Abdomen: Unremarkable. Soft Tissues/Bones: Bilateral breast prostheses noted. Visualized extra thoracic soft tissues otherwise unremarkable. No acute or suspicious bony abnormalities are detected. No evidence of pulmonary embolism or aortic dissection. Masslike, spiculated opacity in the right infrahilar region. While that could represent focal pneumonia, neoplasm is considered more likely. Further evaluation with PET-CT and/or histopathologic correlation is recommended. There is bibasilar consolidation, concerning for pneumonia or aspiration. Scattered areas of airspace disease seen within the mid upper lungs, most compatible with additional pneumonia. Diffuse bronchial wall thickening. Filling defects within the lower lung airways may reflect mucous plugging versus aspiration. Physical Exam:  Vitals: BP (!) 144/65   Pulse 82   Temp 98.4 °F (36.9 °C) (Oral)   Resp 18   Ht 5' 3\" (1.6 m)   Wt 90 lb 6.2 oz (41 kg)   SpO2 95%   BMI 16.01 kg/m²   24 hour intake/output:    Intake/Output Summary (Last 24 hours) at 12/4/2021 1336  Last data filed at 12/4/2021 0800  Gross per 24 hour   Intake 240 ml   Output --   Net 240 ml     Last 3 weights:   Wt Readings from Last 3 Encounters:   12/04/21 90 lb 6.2 oz (41 kg)   11/23/21 84 lb 6.4 oz (38.3 kg)   11/01/21 84 lb 6.4 oz (38.3 kg)       General appearance - oriented to person, place, and time, dehydrated, in mild to moderate distress, chronically ill appearing and cachexia  HEENT: Normocephalic, Atraumatic, Conjuctiva pink, PERRL, Oral mucosa normal, Lips, teeth and gums normal, Trachea midline, Thyroid normal and No noted lymphadenopathy  Chest -coarse bibasilar crackles with isolated wheezes  Cardiovascular - normal rate, regular rhythm, normal S1, S2, no murmurs, rubs, clicks or gallops  Abdomen - soft, nontender, nondistended, no masses or organomegaly   Neurological - Alert and oriented, Normal speech, No focal findings or movement disorder noted and Motor and sensory grossly normal bilaterally  Integumentary - Skin color, texture, turgor normal. No Rashes or lesions  Musculoskeletal -Full ROM times 4 extremities, No clubbing or cyanosis and No peripheral edema      DVT prophylaxis: [x] Lovenox [] SCDs                                 [] SQ Heparin                                 [] Encourage ambulation           [] Already on Anticoagulation                 ASSESSMENT / PLAN :    Principal Problem:    PNA (pneumonia)  CT of the chest on admission showed the following: Masslike, spiculated opacity in the right infrahilar region.  While that could represent focal pneumonia, neoplasm is considered more likely.  Further evaluation with PET-CT and/or histopathologic correlation is recommended. Patient at the moment is being managed for pneumonia and COPD exacerbation, will be following up with pulmonology upon discharge for PET scan and further recommendations. Active Problems:    Essential hypertension  Currently off all antihypertensives. We will continue to monitor blood pressure and manage closely when indicated      Mixed hyperlipidemia  Currently not on any statin      Chronic obstructive pulmonary disease ([facility] [address]  Pulmonologist following with further recommendations. Continue with breathing treatments, Mucinex, Solu-Medrol and empiric IV antibiotics      Acute respiratory failure with hypoxia (HCC)  Oxygen supplementation via nasal cannula, breathing treatment, further management as indicated above. Opacity of lung on imaging study  CTA of the lungs obtained on admission showing: Masslike, spiculated opacity in the right infrahilar region.  While that could represent focal pneumonia, neoplasm is considered more likely.  Further evaluation with PET-CT and/or histopathologic correlation is recommended. Further work-up to be obtained upon discharge and follow-up. Severe muscle deconditioning  Scheduled to be evaluated by PT/OT with further recommendations. Resolved Problems:    * No resolved hospital problems. *    Maintain in-house most probably over the weekend, continue with management as outlined, PT/OT evaluation with further recommendations.   Will obtain home O2 evaluation prior to discharge. A.m. labs.     Electronically signed by Jeffrey Toney MD on 12/4/2021 at 1:36 PM    [address] [place] [address]

## 2021-12-04 NOTE — PLAN OF CARE
Problem: Discharge Planning:  Goal: Discharged to appropriate level of care  Description: Discharged to appropriate level of care  Outcome: Ongoing     Problem:  Activity Intolerance:  Goal: Ability to tolerate increased activity will improve  Description: Ability to tolerate increased activity will improve  Outcome: Ongoing     Problem: Airway Clearance - Ineffective:  Goal: Ability to maintain a clear airway will improve  Description: Ability to maintain a clear airway will improve  Outcome: Ongoing     Problem: Breathing Pattern - Ineffective:  Goal: Ability to achieve and maintain a regular respiratory rate will improve  Description: Ability to achieve and maintain a regular respiratory rate will improve  Outcome: Ongoing     Problem: Gas Exchange - Impaired:  Goal: Levels of oxygenation will improve  Description: Levels of oxygenation will improve  Outcome: Ongoing     Problem: Safety:  Goal: Free from accidental physical injury  Description: Free from accidental physical injury  Outcome: Ongoing  Goal: Free from intentional harm  Description: Free from intentional harm  Outcome: Ongoing     Problem: Daily Care:  Goal: Daily care needs are met  Description: Daily care needs are met  Outcome: Ongoing     Problem: Discharge Planning:  Goal: Patients continuum of care needs are met  Description: Patients continuum of care needs are met  Outcome: Ongoing     Problem: Pain:  Goal: Pain level will decrease  Description: Pain level will decrease  Outcome: Ongoing  Goal: Control of acute pain  Description: Control of acute pain  Outcome: Ongoing  Goal: Control of chronic pain  Description: Control of chronic pain  Outcome: Ongoing

## 2021-12-05 LAB
ALBUMIN SERPL-MCNC: 3.6 GM/DL (ref 3.4–5)
ALP BLD-CCNC: 122 IU/L (ref 40–129)
ALT SERPL-CCNC: 41 U/L (ref 10–40)
ANION GAP SERPL CALCULATED.3IONS-SCNC: 10 MMOL/L (ref 4–16)
AST SERPL-CCNC: 31 IU/L (ref 15–37)
BASOPHILS ABSOLUTE: 0 K/CU MM
BASOPHILS RELATIVE PERCENT: 0.2 % (ref 0–1)
BILIRUB SERPL-MCNC: 0.2 MG/DL (ref 0–1)
BUN BLDV-MCNC: 22 MG/DL (ref 6–23)
CALCIUM SERPL-MCNC: 9.2 MG/DL (ref 8.3–10.6)
CHLORIDE BLD-SCNC: 96 MMOL/L (ref 99–110)
CO2: 31 MMOL/L (ref 21–32)
CREAT SERPL-MCNC: 0.5 MG/DL (ref 0.6–1.1)
DIFFERENTIAL TYPE: ABNORMAL
EOSINOPHILS ABSOLUTE: 0 K/CU MM
EOSINOPHILS RELATIVE PERCENT: 0 % (ref 0–3)
GFR AFRICAN AMERICAN: >60 ML/MIN/1.73M2
GFR NON-AFRICAN AMERICAN: >60 ML/MIN/1.73M2
GLUCOSE BLD-MCNC: 187 MG/DL (ref 70–99)
HCT VFR BLD CALC: 43.8 % (ref 37–47)
HEMOGLOBIN: 14.1 GM/DL (ref 12.5–16)
IMMATURE NEUTROPHIL %: 2.3 % (ref 0–0.43)
LYMPHOCYTES ABSOLUTE: 0.6 K/CU MM
LYMPHOCYTES RELATIVE PERCENT: 3.5 % (ref 24–44)
MAGNESIUM: 2.1 MG/DL (ref 1.8–2.4)
MCH RBC QN AUTO: 29.6 PG (ref 27–31)
MCHC RBC AUTO-ENTMCNC: 32.2 % (ref 32–36)
MCV RBC AUTO: 91.8 FL (ref 78–100)
MONOCYTES ABSOLUTE: 1 K/CU MM
MONOCYTES RELATIVE PERCENT: 5.6 % (ref 0–4)
NUCLEATED RBC %: 0 %
PDW BLD-RTO: 13.5 % (ref 11.7–14.9)
PHOSPHORUS: 2.9 MG/DL (ref 2.5–4.9)
PLATELET # BLD: 324 K/CU MM (ref 140–440)
PMV BLD AUTO: 9.4 FL (ref 7.5–11.1)
POTASSIUM SERPL-SCNC: 4.5 MMOL/L (ref 3.5–5.1)
RBC # BLD: 4.77 M/CU MM (ref 4.2–5.4)
SEGMENTED NEUTROPHILS ABSOLUTE COUNT: 15 K/CU MM
SEGMENTED NEUTROPHILS RELATIVE PERCENT: 88.4 % (ref 36–66)
SODIUM BLD-SCNC: 137 MMOL/L (ref 135–145)
TOTAL IMMATURE NEUTOROPHIL: 0.39 K/CU MM
TOTAL NUCLEATED RBC: 0 K/CU MM
TOTAL PROTEIN: 5.6 GM/DL (ref 6.4–8.2)
WBC # BLD: 17 K/CU MM (ref 4–10.5)

## 2021-12-05 PROCEDURE — 6370000000 HC RX 637 (ALT 250 FOR IP): Performed by: NURSE PRACTITIONER

## 2021-12-05 PROCEDURE — 84100 ASSAY OF PHOSPHORUS: CPT

## 2021-12-05 PROCEDURE — 1200000000 HC SEMI PRIVATE

## 2021-12-05 PROCEDURE — 83735 ASSAY OF MAGNESIUM: CPT

## 2021-12-05 PROCEDURE — 85025 COMPLETE CBC W/AUTO DIFF WBC: CPT

## 2021-12-05 PROCEDURE — 6360000002 HC RX W HCPCS: Performed by: INTERNAL MEDICINE

## 2021-12-05 PROCEDURE — 2700000000 HC OXYGEN THERAPY PER DAY

## 2021-12-05 PROCEDURE — 94640 AIRWAY INHALATION TREATMENT: CPT

## 2021-12-05 PROCEDURE — 6370000000 HC RX 637 (ALT 250 FOR IP): Performed by: INTERNAL MEDICINE

## 2021-12-05 PROCEDURE — 6360000002 HC RX W HCPCS: Performed by: NURSE PRACTITIONER

## 2021-12-05 PROCEDURE — 36415 COLL VENOUS BLD VENIPUNCTURE: CPT

## 2021-12-05 PROCEDURE — 94761 N-INVAS EAR/PLS OXIMETRY MLT: CPT

## 2021-12-05 PROCEDURE — 80053 COMPREHEN METABOLIC PANEL: CPT

## 2021-12-05 PROCEDURE — 2580000003 HC RX 258: Performed by: NURSE PRACTITIONER

## 2021-12-05 RX ORDER — FUROSEMIDE 10 MG/ML
40 INJECTION INTRAMUSCULAR; INTRAVENOUS ONCE
Status: COMPLETED | OUTPATIENT
Start: 2021-12-05 | End: 2021-12-05

## 2021-12-05 RX ADMIN — CLONAZEPAM 1 MG: 1 TABLET ORAL at 20:38

## 2021-12-05 RX ADMIN — THEOPHYLLINE ANHYDROUS 100 MG: 100 CAPSULE, EXTENDED RELEASE ORAL at 20:38

## 2021-12-05 RX ADMIN — Medication 2 PUFF: at 07:25

## 2021-12-05 RX ADMIN — SODIUM CHLORIDE, PRESERVATIVE FREE 10 ML: 5 INJECTION INTRAVENOUS at 20:38

## 2021-12-05 RX ADMIN — ASPIRIN 81 MG: 81 TABLET, CHEWABLE ORAL at 10:00

## 2021-12-05 RX ADMIN — BUDESONIDE AND FORMOTEROL FUMARATE DIHYDRATE 2 PUFF: 160; 4.5 AEROSOL RESPIRATORY (INHALATION) at 19:55

## 2021-12-05 RX ADMIN — Medication 2 PUFF: at 15:04

## 2021-12-05 RX ADMIN — METHYLPREDNISOLONE SODIUM SUCCINATE 40 MG: 40 INJECTION, POWDER, FOR SOLUTION INTRAMUSCULAR; INTRAVENOUS at 06:18

## 2021-12-05 RX ADMIN — SODIUM CHLORIDE, PRESERVATIVE FREE 10 ML: 5 INJECTION INTRAVENOUS at 10:00

## 2021-12-05 RX ADMIN — Medication 2 PUFF: at 11:31

## 2021-12-05 RX ADMIN — GUAIFENESIN 600 MG: 600 TABLET, EXTENDED RELEASE ORAL at 20:38

## 2021-12-05 RX ADMIN — ALBUTEROL SULFATE 2 PUFF: 90 AEROSOL, METERED RESPIRATORY (INHALATION) at 07:25

## 2021-12-05 RX ADMIN — CLONAZEPAM 1 MG: 1 TABLET ORAL at 10:00

## 2021-12-05 RX ADMIN — ALBUTEROL SULFATE 2 PUFF: 90 AEROSOL, METERED RESPIRATORY (INHALATION) at 11:32

## 2021-12-05 RX ADMIN — GUAIFENESIN 600 MG: 600 TABLET, EXTENDED RELEASE ORAL at 10:00

## 2021-12-05 RX ADMIN — MONTELUKAST 10 MG: 10 TABLET, FILM COATED ORAL at 20:38

## 2021-12-05 RX ADMIN — BUDESONIDE AND FORMOTEROL FUMARATE DIHYDRATE 2 PUFF: 160; 4.5 AEROSOL RESPIRATORY (INHALATION) at 07:25

## 2021-12-05 RX ADMIN — ALBUTEROL SULFATE 2 PUFF: 90 AEROSOL, METERED RESPIRATORY (INHALATION) at 15:04

## 2021-12-05 RX ADMIN — ENOXAPARIN SODIUM 40 MG: 40 INJECTION SUBCUTANEOUS at 10:01

## 2021-12-05 RX ADMIN — Medication 2 PUFF: at 19:55

## 2021-12-05 RX ADMIN — GUAIFENESIN 600 MG: 600 TABLET, EXTENDED RELEASE ORAL at 16:07

## 2021-12-05 RX ADMIN — CEFTRIAXONE 1000 MG: 1 INJECTION, POWDER, FOR SOLUTION INTRAMUSCULAR; INTRAVENOUS at 15:59

## 2021-12-05 RX ADMIN — ALBUTEROL SULFATE 2 PUFF: 90 AEROSOL, METERED RESPIRATORY (INHALATION) at 19:55

## 2021-12-05 RX ADMIN — PANTOPRAZOLE SODIUM 40 MG: 40 TABLET, DELAYED RELEASE ORAL at 06:18

## 2021-12-05 RX ADMIN — THEOPHYLLINE ANHYDROUS 100 MG: 100 CAPSULE, EXTENDED RELEASE ORAL at 10:00

## 2021-12-05 RX ADMIN — METHYLPREDNISOLONE SODIUM SUCCINATE 40 MG: 40 INJECTION, POWDER, FOR SOLUTION INTRAMUSCULAR; INTRAVENOUS at 16:07

## 2021-12-05 RX ADMIN — TRAZODONE HYDROCHLORIDE 50 MG: 50 TABLET ORAL at 22:44

## 2021-12-05 RX ADMIN — FUROSEMIDE 40 MG: 10 INJECTION, SOLUTION INTRAMUSCULAR; INTRAVENOUS at 16:06

## 2021-12-05 RX ADMIN — METHYLPREDNISOLONE SODIUM SUCCINATE 40 MG: 40 INJECTION, POWDER, FOR SOLUTION INTRAMUSCULAR; INTRAVENOUS at 22:44

## 2021-12-05 RX ADMIN — Medication 10 MG: at 20:38

## 2021-12-05 ASSESSMENT — PAIN SCALES - GENERAL
PAINLEVEL_OUTOF10: 0
PAINLEVEL_OUTOF10: 0

## 2021-12-05 NOTE — PROGRESS NOTES
ATTENDING PHYSICIAN'S PROGRESS NOTES    Patient:  Maksim [facility]      Unit/Bed:1125/1125-A    YOB: 1945    MRN: [id number]     Acct: [de-identified]     Admit date: 12/1/2021    Patient Seen, Chart, Consults notes, Labs, Radiology studies reviewed. SUBJECTIVE:   Day 4 of stay with COPD exacerbation due to pneumonic process and most recent (in last 24 hours) has had minimal improvement in her symptoms. Respiratory panel positive for rhinovirus/enterovirus PCR. No new complaints overnight. Desaturation to the 80s on room air. Chest x-ray with pleural effusion and therefore patient scheduled for a dose of Lasix 40 mg IV push and will be reassessed tomorrow. All other ROS negative except noted in HPI    Past, Family, Social History unchanged from admission. Diet:  ADULT DIET;  Regular  ADULT ORAL NUTRITION SUPPLEMENT; Breakfast, Lunch; Standard High Calorie/High Protein Oral Supplement    Medications:  Scheduled Meds:   furosemide  40 mg IntraVENous Once    methylPREDNISolone  40 mg IntraVENous Q8H    aspirin  81 mg Oral Daily    melatonin  10 mg Oral Nightly    montelukast  10 mg Oral Nightly    sodium chloride flush  5-40 mL IntraVENous 2 times per day    enoxaparin  40 mg SubCUTAneous Daily    cefTRIAXone (ROCEPHIN) IV  1,000 mg IntraVENous Q24H    budesonide-formoterol  2 puff Inhalation BID    pantoprazole  40 mg Oral QAM AC    albuterol sulfate HFA  2 puff Inhalation 4x daily    ipratropium  2 puff Inhalation 4x daily    theophylline  100 mg Oral BID    guaiFENesin  600 mg Oral TID     Continuous Infusions:   sodium chloride Stopped (12/02/21 2049)     PRN Meds:albuterol sulfate HFA, sodium chloride, tiZANidine, traZODone, sodium chloride flush, sodium chloride, ondansetron **OR** ondansetron, polyethylene glycol, acetaminophen **OR** acetaminophen, benzonatate, clonazePAM    OBJECTIVE:    CBC:   Recent Labs     12/03/21  0441 12/05/21  1219   WBC 16.6* 17.0*   HGB 12.7 14.1    324     BMP:    Recent Labs     12/03/21  0441 12/05/21  1219    137   K 4.2 4.5   CL 95* 96*   CO2 29 31   BUN 16 22   CREATININE 0.6 0.5*   GLUCOSE 255* 187*     Calcium:  Recent Labs     12/05/21  1219   CALCIUM 9.2     Ionized Calcium:No results for input(s): IONCA in the last 72 hours. Magnesium:  Recent Labs     12/05/21  1219   MG 2.1     Phosphorus:  Recent Labs     12/05/21  1219   PHOS 2.9     BNP:No results for input(s): BNP in the last 72 hours. Glucose:No results for input(s): POCGLU in the last 72 hours. HgbA1C: No results for input(s): LABA1C in the last 72 hours. INR: No results for input(s): INR in the last 72 hours. Hepatic:   Recent Labs     12/05/21  1219   ALKPHOS 122   ALT 41*   AST 31   PROT 5.6*   BILITOT 0.2   LABALBU 3.6     Amylase and Lipase:No results for input(s): LACTA, AMYLASE in the last 72 hours. Lactic Acid: No results for input(s): LACTA in the last 72 hours. Troponin:   No results for input(s): CKTOTAL, CKMB, TROPONINT in the last 72 hours. BNP: No results for input(s): BNP in the last 72 hours. Lipids: No results for input(s): CHOL, TRIG, HDL, LDLCALC in the last 72 hours. Invalid input(s): LDL  ABGs: No results found for: PH, PCO2, PO2, HCO3, O2SAT    Radiology reports as per the Radiologist  Radiology: CTA PULMONARY W CONTRAST    Result Date: 12/1/2021  EXAMINATION: CTA OF THE CHEST 12/1/2021 12:13 pm TECHNIQUE: CTA of the chest was performed after the administration of intravenous contrast.  Multiplanar reformatted images are provided for review. MIP images are provided for review. Dose modulation, iterative reconstruction, and/or weight based adjustment of the mA/kV was utilized to reduce the radiation dose to as low as reasonably achievable.  COMPARISON: Chest CT, 03/18/2020 HISTORY: ORDERING SYSTEM PROVIDED HISTORY: Shortness of breath TECHNOLOGIST PROVIDED HISTORY: Reason for exam:->Shortness of breath Decision Support Exception - unselect if not a suspected or confirmed emergency medical condition->Emergency Medical Condition (MA) Reason for Exam: Shortness of breath Acuity: Acute Type of Exam: Initial Additional signs and symptoms: productive cough Relevant Medical/Surgical History: Hx Breast surg, Michelle, COPD / 55cc [id number] FINDINGS: Pulmonary Arteries: The pulmonary arteries are adequately opacified. No filling defects are identified within the pulmonary arteries to suggest pulmonary embolism. The Mediastinum: Visualized thyroid unremarkable. Mildly enlarged bilateral hilar lymph nodes are seen. For example, on the right, lymph node measures 1.4 x 0.9 cm (series 4, image 58). On the left, lymph node measures 1.5 x 0.9 cm (series 4, image 53). Esophagus is patulous but otherwise unremarkable. Cardiac chambers are enlarged. No pericardial effusion. Thoracic aorta is normal in caliber without evidence of dissection. Lungs/pleura: There has a new masslike spiculated opacity seen in the right infrahilar region, enveloping the bronchovascular bundle. Consolidation is seen within the right lower lobe more inferiorly. Additional consolidation is identified within the left lower lobe. There is diffuse bronchial wall thickening. Filling defects are identified within the lower lung airways bilaterally, especially on the left. Scattered areas of airspace disease are seen within the mid upper lungs bilaterally. Lobulated nodule within the right middle lobe is unchanged measuring approximately 7 mm (series 3, image 78). This has been is present since at least 2017 and is considered benign. All these findings are seen on a background of emphysema. Small bilateral pleural effusions are present. Upper Abdomen: Unremarkable. Soft Tissues/Bones: Bilateral breast prostheses noted. Visualized extra thoracic soft tissues otherwise unremarkable. No acute or suspicious bony abnormalities are detected.      No evidence of pulmonary embolism or Heparin                                 [] Encourage ambulation           [] Already on Anticoagulation                 ASSESSMENT / PLAN :    Principal Problem:    PNA (pneumonia)  CT of the chest on admission showed the following: Masslike, spiculated opacity in the right infrahilar region.  While that could represent focal pneumonia, neoplasm is considered more likely.  Further evaluation with PET-CT and/or histopathologic correlation is recommended. Patient at the moment is being managed for pneumonia and COPD exacerbation, will be following up with pulmonology upon discharge for PET scan and further recommendations. Pleural effusion  Parapneumonic effusion versus fluid overload. Scheduled for 40 mg of IV push Lasix and will be reassessed in the morning with a repeat chest x-ray. Active Problems:    Essential hypertension  Currently off all antihypertensives. We will continue to monitor blood pressure and manage closely when indicated      Mixed hyperlipidemia  Currently not on any statin      Chronic obstructive pulmonary disease ([facility] [address]  Pulmonologist following with further recommendations. Continue with breathing treatments, Mucinex, Solu-Medrol and empiric IV antibiotics      Acute respiratory failure with hypoxia (HCC)  Oxygen supplementation via nasal cannula, breathing treatment, further management as indicated above. Opacity of lung on imaging study  CTA of the lungs obtained on admission showing: Masslike, spiculated opacity in the right infrahilar region.  While that could represent focal pneumonia, neoplasm is considered more likely.  Further evaluation with PET-CT and/or histopathologic correlation is recommended. Further work-up to be obtained upon discharge and follow-up. Severe muscle deconditioning  Patient has ultimately declined placement.   Therefore on discharge, she will be assessed for home oxygen and will be discharged with home health including PT/OT and home health nurse follow-up. Resolved Problems:    * No resolved hospital problems. *    Maintain in-house most probably over the weekend, continue with management as outlined, PT/OT evaluation with further recommendations. Will obtain home O2 evaluation prior to discharge. A.m. labs. Possible discharge tomorrow.     Electronically signed by Chencho Ivan MD on 12/5/2021 at 3:22 PM    Rounding Hospitalist

## 2021-12-06 ENCOUNTER — APPOINTMENT (OUTPATIENT)
Dept: GENERAL RADIOLOGY | Age: 76
DRG: 193 | End: 2021-12-06
Payer: MEDICARE

## 2021-12-06 VITALS
BODY MASS INDEX: 16.25 KG/M2 | HEIGHT: 63 IN | HEART RATE: 77 BPM | OXYGEN SATURATION: 92 % | SYSTOLIC BLOOD PRESSURE: 145 MMHG | WEIGHT: 91.71 LBS | TEMPERATURE: 98.1 F | RESPIRATION RATE: 18 BRPM | DIASTOLIC BLOOD PRESSURE: 83 MMHG

## 2021-12-06 PROBLEM — R64 PULMONARY CACHEXIA DUE TO CHRONIC OBSTRUCTIVE PULMONARY DISEASE (HCC): Status: ACTIVE | Noted: 2021-12-06

## 2021-12-06 PROBLEM — J44.9 PULMONARY CACHEXIA DUE TO CHRONIC OBSTRUCTIVE PULMONARY DISEASE (HCC): Status: ACTIVE | Noted: 2021-12-06

## 2021-12-06 LAB
CULTURE: NORMAL
Lab: NORMAL
SPECIMEN: NORMAL

## 2021-12-06 PROCEDURE — 2580000003 HC RX 258: Performed by: NURSE PRACTITIONER

## 2021-12-06 PROCEDURE — 6370000000 HC RX 637 (ALT 250 FOR IP): Performed by: INTERNAL MEDICINE

## 2021-12-06 PROCEDURE — 99231 SBSQ HOSP IP/OBS SF/LOW 25: CPT | Performed by: INTERNAL MEDICINE

## 2021-12-06 PROCEDURE — 94618 PULMONARY STRESS TESTING: CPT

## 2021-12-06 PROCEDURE — 97116 GAIT TRAINING THERAPY: CPT

## 2021-12-06 PROCEDURE — 94761 N-INVAS EAR/PLS OXIMETRY MLT: CPT

## 2021-12-06 PROCEDURE — 71045 X-RAY EXAM CHEST 1 VIEW: CPT

## 2021-12-06 PROCEDURE — 94664 DEMO&/EVAL PT USE INHALER: CPT

## 2021-12-06 PROCEDURE — 97162 PT EVAL MOD COMPLEX 30 MIN: CPT

## 2021-12-06 PROCEDURE — 97530 THERAPEUTIC ACTIVITIES: CPT

## 2021-12-06 PROCEDURE — 6370000000 HC RX 637 (ALT 250 FOR IP): Performed by: NURSE PRACTITIONER

## 2021-12-06 PROCEDURE — 6360000002 HC RX W HCPCS: Performed by: INTERNAL MEDICINE

## 2021-12-06 PROCEDURE — 6360000002 HC RX W HCPCS: Performed by: NURSE PRACTITIONER

## 2021-12-06 PROCEDURE — 94640 AIRWAY INHALATION TREATMENT: CPT

## 2021-12-06 RX ORDER — CEFDINIR 300 MG/1
300 CAPSULE ORAL 2 TIMES DAILY
Qty: 10 CAPSULE | Refills: 0 | Status: SHIPPED | OUTPATIENT
Start: 2021-12-06 | End: 2021-12-11

## 2021-12-06 RX ORDER — AZITHROMYCIN 250 MG/1
250 TABLET, FILM COATED ORAL DAILY
Qty: 10 TABLET | Refills: 0 | Status: SHIPPED | OUTPATIENT
Start: 2021-12-06 | End: 2021-12-16

## 2021-12-06 RX ORDER — BENZONATATE 100 MG/1
100 CAPSULE ORAL 3 TIMES DAILY PRN
Qty: 30 CAPSULE | Refills: 0 | Status: SHIPPED | OUTPATIENT
Start: 2021-12-06 | End: 2021-12-13

## 2021-12-06 RX ORDER — PREDNISONE 20 MG/1
40 TABLET ORAL 2 TIMES DAILY
Qty: 20 TABLET | Refills: 0 | Status: SHIPPED | OUTPATIENT
Start: 2021-12-06 | End: 2021-12-16

## 2021-12-06 RX ADMIN — METHYLPREDNISOLONE SODIUM SUCCINATE 40 MG: 40 INJECTION, POWDER, FOR SOLUTION INTRAMUSCULAR; INTRAVENOUS at 06:21

## 2021-12-06 RX ADMIN — ALBUTEROL SULFATE 2 PUFF: 90 AEROSOL, METERED RESPIRATORY (INHALATION) at 08:28

## 2021-12-06 RX ADMIN — PANTOPRAZOLE SODIUM 40 MG: 40 TABLET, DELAYED RELEASE ORAL at 06:21

## 2021-12-06 RX ADMIN — THEOPHYLLINE ANHYDROUS 100 MG: 100 CAPSULE, EXTENDED RELEASE ORAL at 08:44

## 2021-12-06 RX ADMIN — ALBUTEROL SULFATE 2 PUFF: 90 AEROSOL, METERED RESPIRATORY (INHALATION) at 16:10

## 2021-12-06 RX ADMIN — ASPIRIN 81 MG: 81 TABLET, CHEWABLE ORAL at 08:45

## 2021-12-06 RX ADMIN — Medication 2 PUFF: at 08:29

## 2021-12-06 RX ADMIN — Medication 2 PUFF: at 16:11

## 2021-12-06 RX ADMIN — GUAIFENESIN 600 MG: 600 TABLET, EXTENDED RELEASE ORAL at 14:16

## 2021-12-06 RX ADMIN — SODIUM CHLORIDE, PRESERVATIVE FREE 10 ML: 5 INJECTION INTRAVENOUS at 08:45

## 2021-12-06 RX ADMIN — BUDESONIDE AND FORMOTEROL FUMARATE DIHYDRATE 2 PUFF: 160; 4.5 AEROSOL RESPIRATORY (INHALATION) at 08:30

## 2021-12-06 RX ADMIN — ALBUTEROL SULFATE 2 PUFF: 90 AEROSOL, METERED RESPIRATORY (INHALATION) at 12:07

## 2021-12-06 RX ADMIN — ENOXAPARIN SODIUM 40 MG: 40 INJECTION SUBCUTANEOUS at 08:44

## 2021-12-06 RX ADMIN — Medication 2 PUFF: at 12:08

## 2021-12-06 RX ADMIN — CLONAZEPAM 1 MG: 1 TABLET ORAL at 08:49

## 2021-12-06 RX ADMIN — GUAIFENESIN 600 MG: 600 TABLET, EXTENDED RELEASE ORAL at 08:45

## 2021-12-06 ASSESSMENT — PAIN SCALES - GENERAL: PAINLEVEL_OUTOF10: 0

## 2021-12-06 NOTE — CONSULTS
[facility] PHYSICAL THERAPY EVALUATION  Rod Kyle, 1945, 1125/1125-A, 12/6/2021    History  Nikolai:  The primary encounter diagnosis was Multifocal pneumonia. Diagnoses of COPD exacerbation ([address] Hypoxia, Leukocytosis, unspecified type, Lung mass, Chronic obstructive pulmonary disease, unspecified COPD type ([address] and Pneumonia of both lungs due to infectious organism, unspecified part of lung were also pertinent to this visit. Patient  has a past medical history of Former smoker, Hypertension, Moderate COPD (chronic obstructive pulmonary disease) ([address] and Other emphysema ([address] Patient  has a past surgical history that includes Hysterectomy; Cholecystectomy; Abdomen surgery; other surgical history (07/16/2018); and Breast surgery. Subjective:  Patient states:  \"I'm actually doing alright today! I got myself up to the BR earlier\". Pain:  No c/o. Communication with other providers:  Handoff to RN, permission to tx from RN. Restrictions: Fall risk, on supplemental 02, Droplet precaution    Home Setup/Prior level of function  Social/Functional History  Lives With: Daughter  Type of Home: Apartment  Home Layout: One level  Home Access: Level entry  Bathroom Shower/Tub: Walk-in shower  Bathroom Toilet: Standard  Home Equipment:  (not on home 02 currently)  Receives Help From: Family (pt typically does not need help from daughter)  ADL Assistance: Independent  Homemaking Assistance: Independent (daughter is able to help usually when feeling well)  Homemaking Responsibilities: Yes  Ambulation Assistance: Independent  Transfer Assistance: Independent  Active : Yes (macular degeneration)  Occupation: Retired  Additional Comments: no falls    Examination of body systems (includes body structures/functions, activity/participation limitations):  · Observation: Pt is awake, alert up in semi-fowlers upon arrival  · [facility]  · [facility]  · Cardiopulmonary:   On supplemental 02, stable throughout. · Cognition: WFL, pt is pleasant and oriented, see OT/SLP note for further evaluation. Musculoskeletal  · ROM R/L:  WFL BLE. · Strength R/L:  Generally 4/5, decreased in function and endurance. · Neuro:  Sensation intact, decreased dynamic balance    · Gait pattern: Pt demonstrates step-through pattern KODAK with fair jack, overall increased postural sway with x1 lateral LOB. Mobility:  · Supine to sit:  SBA  · Transfers: CGA-SBA  · Sitting balance:  SBA. · Standing balance:  CGA. · Gait: CGA-SBA    [facility] 6 Clicks Inpatient Mobility:  AM-PAC Inpatient Mobility Raw Score : 19    Treatment:  Bed mobility: PT encourages sup>sit, provides v/c for sequencing. Pt demonstrates adequate ability to advance LE, requires SBA for LE/ hips to EOB and for trunk to upright for safety only. Pt manages bed mobility efficiently. Sitting balance: Seated EOB pt demonstrates good seated balance, intermittent UE support required for maintaining upright during dynamic LE MMT tasks, no LOB or increased sway. Sit<>Stand: Pt performs STS from EOB to upright min impulsively, requires v/c for return to seated for PT placement of gait belt/safety with CGA, v/c for proper sequencing. Pt demonstrates efficient time to upright. Additional STS from EOB, CGA, no AD. Return to seated in recliner end of session pt demonstrates good control, v/c for safe sequencing. Standing balance: PT assessed balance reactions end of session in standing at recliner for safety. A/P nudge test, e/c balance, and marching in place pt able to perform with CGA, no significant LOB. Gait: Pt AMB x30 ft in room without RW, CGA- SBA for safety,  step-through pattern KODAK with fair jack, overall increased postural sway with x1 lateral LOB. PT CGA during LOB, pt R LOB with use of RUE on bed rail to correct self successfully. Overall pt with decreased balance in gait.  Pt reports no use of AD at baseline. End of session pt left in recliner with LE elevated, with lines managed, call light, phone, exit alarm, tray, all needs, RN aware. Education: PT discussed possible use of SPC for safety, dicussed pt prior and current LOF, discussed d/c plan. Pt feels safe to return home without PT. Assessment:    Pt is a 68 y/o female admitted 12/1 with c/o  SOB worsening over weeks. Pt with multiple recent admissions/ER visits d/t respiratory complications. Patient with significant h/o COPD, HTN, see chart. Per pt report pt has been performing ADLs/IADLs independently up until pt \"got sick\" where most recently pt reports assist from daughter for meals. At this time pt appears to be functioning near, slightly below baseline. Pt is now presenting with impairments in LE strength, functional endurance, safety awareness, balance, gait deficits. Pt would benefit from skilled PT services in order to address impairments and promote return to PLOF. PT to recommend d/c to Home with 24/7 assist initially with [facility] PT services. Complexity: Moderate  Prognosis: Good, no significant barriers to participation at this time. Plan Times per week: 2-3+/week, 1 week,   Discharge Recommendations: 24 hour supervision or assist, Home with Home health PT  Equipment: Pt may benefit from RW    Goals:  Short term goals  Time Frame for Short term goals: 1 week  Short term goal 1: Pt will AMB x45 ft with SPC/LRAD for stability, CGA for safety, no LOB. Short term goal 2: Pt will demonstrate transfers to/from commode with SBA for safety only. Short term goal 3: Pt will tolerate x10 minutes dynamic standing balance during performance of functional tasks, SBA with UE support. Treatment plan:  Bed mobility, transfers, balance, gait, TA, TX    Recommendations for NURSING mobility: AMB to/from BR with close CGA and gait belt.     Time:   Time in: 10:24  Time out: 10:56  Timed treatment minutes: 23  Total time: 32    Electronically signed by:    Gordon Sheldon, PT  52/6/1069, 5:40 PM  PT Lic #: [id number]

## 2021-12-06 NOTE — PROGRESS NOTES
Pt qualified for home oxygen. Paperwork faxed to [email or web address]. Please do not discharge pt without oxygen. This testing will  and have to be repeated if pt has not discharged 48 hours from time testing was ordered. Please call Florian @ [phone] if oxygen has not been delivered prior to pt discharging. Thanks.

## 2021-12-06 NOTE — PROGRESS NOTES
Doctor Please copy and paste below in your progress note per DME requirements. Doctor Please copy and paste below in your progress note per DME requirements. Patient was seen in hospital for  COPD . I am prescribing oxygen because the diagnosis and testing requires the patient to have oxygen in the home. Conditions will improve or be benefited by oxygen use. The patient is able to perform good mobility and therefore requires the use of a portable oxygen system for ambulation.

## 2021-12-06 NOTE — PROGRESS NOTES
ONCOLOGY HEMATOLOGY CARE ([facility])  PROGRESS REPORT    HISTORY OF PRESENT ILLNESS   Antonio Solis is a 67 y.o. female who presents with worsening shortness of breath and productive cough with associated functional decline. She reports recent weight loss, no hemoptysis, bone pain. CTA pulmonary 21  Impression   No evidence of pulmonary embolism or aortic dissection. Masslike, spiculated opacity in the right infrahilar region. While that   could represent focal pneumonia, neoplasm is considered more likely. Further   evaluation with PET-CT and/or histopathologic correlation is recommended. There is bibasilar consolidation, concerning for pneumonia or aspiration. Scattered areas of airspace disease seen within the mid upper lungs, most   compatible with additional pneumonia. Diffuse bronchial wall thickening. Filling defects within the lower lung   airways may reflect mucous plugging versus aspiration. She is a patient of Dr. Ventura Mix  Has smoked 1 pack in four days for 50 years. Recently quit smoking. Two children  Father had cancer and  in early 52's, she is unaware of what type  She had EGD every 2 years per Dr. Ismael Schwartz for reported stomach infeciton. Has had colonoscopy within past five years which she has due to colon polyps    Due to mass like consolidation we were called to evaluate. She denied any acute complaint. She needs home oxygen. She has breakfast.  No nausea, vomiting or diarrhea. No fever or chills. No acute pain. PHYSICAL EXAM    Vitals: BP (!) 145/83   Pulse 77   Temp 98.1 °F (36.7 °C) (Oral)   Resp 18   Ht 5' 3\" (1.6 m)   Wt 91 lb 11.4 oz (41.6 kg)   SpO2 92%   BMI 16.25 kg/m²   CONSTITUTIONAL: awake, alert, cooperative, no apparent distress   EYES:  sclera clear and conjunctiva normal  ENT: Normocephalic, without obvious abnormality, atraumatic  NECK: supple, symmetrical.  No JVD.   HEMATOLOGIC/LYMPHATIC: no cervical, supraclavicular or axillary lymphadenopathy   LUNGS: Fair air entry bilaterally. CARDIOVASCULAR: regular rate and rhythm, normal S1 and S2, no murmur   ABDOMEN: normal bowel sound, soft, non-distended, non-tender, no masses palpated, no hepatosplenomgaly   MUSCULOSKELETAL: full range of motion noted, tone is normal  NEUROLOGIC: awake, alert, oriented to name, place and time. Motor skills grossly intact. Cranial nerves II through XII grossly intact. SKIN: Normal skin color, texture, turgor and no jaundice. Skin appears intact   EXTREMITIES: no LE edema    LABORATORY RESULTS  CBC:   Recent Labs     12/05/21  1219   WBC 17.0*   HGB 14.1        BMP:    Recent Labs     12/05/21  1219      K 4.5   CL 96*   CO2 31   BUN 22   CREATININE 0.5*   GLUCOSE 187*     Hepatic:   Recent Labs     12/05/21  1219   AST 31   ALT 41*   BILITOT 0.2   ALKPHOS 122       ASSESSMENT/RECOMMENDATION    1. Right infrahilar mass-like opacity noted on CT scan. Plan to continue present treatment with antibiotics, bronchodilator, Dewey-Dur added. Dr. Pickett Clubs following. Plan for PET CT in two three weeks time. We will plan to follow up with patient after her PET CT.     2.  COPD. Pulmonologist on board. I strongly recommend she follow-up at the cancer center as an outpatient after her PET scan.   Thanks

## 2021-12-06 NOTE — DISCHARGE SUMMARY
Patient denied any fever, chills. Patient denied any chest pain, abdominal pain, denied any urinary complaints, denied any constipation or diarrhea. Patient has been losing weight-reported that she was weighing 103 pounds initially and currently weighs 80 pounds. But could not tell me during what period of time she states she loses weight. Patient's PCP tried appetite stimulant but patient could not tolerate. With the shortness of breath patient has been getting panic attacks. Patient's daughter brought her a pulse oximeter and she has been checking her oxygen saturation. Initially her oxygen saturation would be around 95% but yesterday it was down to 82%, which prompted her daughter to take her to the ER. Hospital Course: Admitted as above, CTA of the chest did show the following:    Impression:       No evidence of pulmonary embolism or aortic dissection. Masslike, spiculated opacity in the right infrahilar region.  While that could represent focal pneumonia, neoplasm is considered more likely.  Further evaluation with PET-CT and/or histopathologic correlation is recommended. There is bibasilar consolidation, concerning for pneumonia or aspiration. Scattered areas of airspace disease seen within the mid upper lungs, most compatible with additional pneumonia. Diffuse bronchial wall thickening.  Filling defects within the lower lung airways may reflect mucous plugging versus aspiration. Patient was seen by pulmonologist, managed for pneumonia, with further follow-up in the clinic to be arranged for PET scan. Patient was also seen by oncologist, who will be following up also upon discharge for the PET scan. Patient has shown marked improvement, was assessed for home oxygen and did qualify and therefore will be discharged home with home oxygen. Her breathing had improved markedly and she denied any shortness of breath or chest pain, fever or chills. She was discharged in a stable state.   Patient indicated she has had a low weight since birth, but eats normally. Her albumin was within normal limits and therefore her current weight is likely a result of the COPD, the more reason to rule out malignancy with the PET scan. Prognosis: Fair    Physical Examination:   General appearance - oriented to person, place, and time, dehydrated, in mild to moderate distress, chronically ill appearing and cachexia  HEENT: Normocephalic, Atraumatic, Conjuctiva pink, PERRL, Oral mucosa normal, Lips, teeth and gums normal, Trachea midline, Thyroid normal and No noted lymphadenopathy  Chest -fine bibasilar crackles with isolated wheezes  Cardiovascular - normal rate, regular rhythm, normal S1, S2, no murmurs, rubs, clicks or gallops  Abdomen - soft, nontender, nondistended, no masses or organomegaly   Neurological - Alert and oriented, Normal speech, No focal findings or movement disorder noted and Motor and sensory grossly normal bilaterally  Integumentary - Skin color, texture, turgor normal. No Rashes or lesions  Musculoskeletal -Full ROM times 4 extremities, No clubbing or cyanosis and No peripheral edema    Significant Diagnostic Studies:     Impression:       No evidence of pulmonary embolism or aortic dissection. Masslike, spiculated opacity in the right infrahilar region.  While that could represent focal pneumonia, neoplasm is considered more likely.  Further evaluation with PET-CT and/or histopathologic correlation is recommended. There is bibasilar consolidation, concerning for pneumonia or aspiration. Scattered areas of airspace disease seen within the mid upper lungs, most compatible with additional pneumonia. Diffuse bronchial wall thickening.  Filling defects within the lower lung airways may reflect mucous plugging versus aspiration.       Pending Investigation/labs: Sputum cultures    Recent Labs:  CBC with Differential:    Lab Results   Component Value Date    WBC 17.0 12/05/2021    RBC 4.77 12/05/2021 HGB 14.1 12/05/2021    HCT 43.8 12/05/2021     12/05/2021    MCV 91.8 12/05/2021    MCH 29.6 12/05/2021    MCHC 32.2 12/05/2021    RDW 13.5 12/05/2021    SEGSPCT 88.4 12/05/2021    BANDSPCT 6 03/18/2020    LYMPHOPCT 3.5 12/05/2021    MONOPCT 5.6 12/05/2021    EOSPCT 2.3 05/16/2018    BASOPCT 0.2 12/05/2021    MONOSABS 1.0 12/05/2021    LYMPHSABS 0.6 12/05/2021    EOSABS 0.0 12/05/2021    BASOSABS 0.0 12/05/2021    DIFFTYPE AUTOMATED DIFFERENTIAL 12/05/2021     CMP:    Lab Results   Component Value Date     12/05/2021    K 4.5 12/05/2021    CL 96 12/05/2021    CO2 31 12/05/2021    BUN 22 12/05/2021    CREATININE 0.5 12/05/2021    GFRAA >60 12/05/2021    AGRATIO 2.9 05/26/2021    LABGLOM >60 12/05/2021    GLUCOSE 187 12/05/2021    PROT 5.6 12/05/2021    PROT 6.6 08/04/2011    LABALBU 3.6 12/05/2021    CALCIUM 9.2 12/05/2021    BILITOT 0.2 12/05/2021    ALKPHOS 122 12/05/2021    AST 31 12/05/2021    ALT 41 12/05/2021     Magnesium:    Lab Results   Component Value Date    MG 2.1 12/05/2021     Phosphorus:    Lab Results   Component Value Date    PHOS 2.9 12/05/2021     ABG:    Lab Results   Component Value Date    BE MINUS 12/01/2021       Patient Instructions  Medications:     Medication List      START taking these medications    azithromycin 250 MG tablet  Commonly known as: ZITHROMAX  Take 1 tablet by mouth daily for 10 days     benzonatate 100 MG capsule  Commonly known as: TESSALON  Take 1 capsule by mouth 3 times daily as needed for Cough     cefdinir 300 MG capsule  Commonly known as: OMNICEF  Take 1 capsule by mouth 2 times daily for 5 days     predniSONE 20 MG tablet  Commonly known as: DELTASONE  Take 2 tablets by mouth 2 times daily for 10 days     theophylline 100 MG extended release capsule  Commonly known as: DIVINE-24  Take 1 capsule by mouth 2 times daily        CONTINUE taking these medications    * albuterol sulfate  (90 Base) MCG/ACT inhaler  USE 2 INHALATIONS ORALLY INTO THE LUNGS EVERY 6 HOURS AS NEEDED FOR WHEEZING OR SHORTNESS OF BREATH     * albuterol sulfate  (90 Base) MCG/ACT inhaler  Commonly known as: Ventolin HFA  Inhale 2 puffs into the lungs 4 times daily as needed for Wheezing     aspirin 81 MG tablet     Breo Ellipta 200-25 MCG/INH Aepb inhaler  Generic drug: Fluticasone furoate-vilanterol  Inhale 1 puff into the lungs daily     clonazePAM 1 MG tablet  Commonly known as: KLONOPIN  TAKE 1 TABLET TWICE DAILY  AS NEEDED FOR ANXIETY     guaiFENesin 600 MG extended release tablet  Commonly known as: MUCINEX  Take 1 tablet by mouth 2 times daily for 15 days     melatonin 5 MG Tabs tablet     montelukast 10 MG tablet  Commonly known as: SINGULAIR  TAKE 1 TABLET NIGHTLY     omeprazole 20 MG delayed release capsule  Commonly known as: PRILOSEC  Take 1 capsule by mouth every morning (before breakfast)     sodium chloride 0.65 % nasal spray  Commonly known as: OCEAN, BABY AYR     tiZANidine 4 MG tablet  Commonly known as: ZANAFLEX  TAKE 1 TABLET BY MOUTH EVERY DAY AT NIGHT     traZODone 50 MG tablet  Commonly known as: DESYREL  TAKE 1 TABLET NIGHTLY     VITAMIN A PO     VITAMIN C ADULT GUMMIES PO     VITAMIN D PO         * This list has 2 medication(s) that are the same as other medications prescribed for you. Read the directions carefully, and ask your doctor or other care provider to review them with you. Where to Get Your Medications      These medications were sent to [address] [place] [address]. - [phone]  [facility], [address]    Phone: [phone]   · azithromycin 250 MG tablet  · benzonatate 100 MG capsule  · cefdinir 300 MG capsule  · predniSONE 20 MG tablet  · theophylline 100 MG extended release capsule         Activity: activity as tolerated  Diet: low fat, low cholesterol diet  Wound Care: none needed    Follow-up with:   No follow-up provider specified.   Services: None    Electronically Signed by: Teresa Antonio MD, MD.    Time spent on discharge/dictation: 40 minutes

## 2021-12-06 NOTE — PROGRESS NOTES
RBC 4.77   HGB 14.1   HCT 43.8      MCV 91.8   MCH 29.6   MCHC 32.2   RDW 13.5   SEGSPCT 88.4*      BMP:  Recent Labs     12/05/21  1219      K 4.5   CL 96*   CO2 31   BUN 22   CREATININE 0.5*   CALCIUM 9.2   GLUCOSE 187*      ABG:  No results for input(s): PH, PO2ART, EML5VRB, HCO3, BEART, O2SAT in the last 72 hours. Lab Results   Component Value Date    PROBNP 355.1 (H) 12/03/2021    PROBNP 153.3 12/01/2021    PROBNP 292.0 11/23/2021    THEOPH 5.0 (L) 03/22/2020     No results found for: [facility]    Radiology Review:  Pertinent images / reports were reviewed as a part of this visit. Assessment:     Patient Active Problem List   Diagnosis    Essential hypertension    Mixed hyperlipidemia    Anxiety    Primary insomnia    Lung nodule    Other emphysema ([address]    Former smoker    Chronic obstructive pulmonary disease ([address]    Vaginal dryness    H/O total hysterectomy    Vaginal atrophy    Leg cramping    Bowel obstruction (HCC)    SBO (small bowel obstruction) (HCC)    Adynamic ileus (HCC)    Tobacco abuse    Lower abdominal pain    Nausea    Abnormal CT of the abdomen    Ileus ([address]    Gastroesophageal reflux disease without esophagitis    Anxiety and depression    Poor appetite    Acute on chronic respiratory failure with hypoxia (HCC)    Anemia    PNA (pneumonia)    Acute respiratory failure with hypoxia (HCC)    Opacity of lung on imaging study       Plan:   1. Overall the patient has improved. 2. Inc. activity. 3. PET scan as outpt.   Soni Terry MD  12/6/2021  11:11 AM

## 2021-12-06 NOTE — PROGRESS NOTES
Face to face for oxygen requirement    Patient was seen in hospital for  COPD . I am prescribing oxygen because the diagnosis and testing requires the patient to have oxygen in the home. Conditions will improve or be benefited by oxygen use. The patient is able to perform good mobility and therefore requires the use of a portable oxygen system for ambulation.       Electronically signed by Delaney Mckeon MD on 12/6/21 at 1:04 PM EST

## 2021-12-06 NOTE — PROGRESS NOTES
12/6/2021 7:27 AM  Patient Room #: 8211/2566-R  Patient Name: Rubi Asher    (Step 1 Done by RN if possible otherwise call Pulmonary Diagnostics)  1. Place patient on room air at rest for at least 30 minutes. If patient falls below 88% before 30 minutes then you can record the level and stop. Record room air saturation level _86_ %. If patient is at 88% or below, they will qualify for home oxygen and you can stop. If level does not fall below 88%, fill in level above. If indicated continue to Step 2. Signature:_Eugene Lim RRT____ Date: 12/06/2021___  (Step 2&3 Done by P)  2. Ambulate patient on room air until saturation falls below 89%. Record level of room air saturation with ambulation___ %. Next, place patient back on ___lpm oxygen and ambulate, record level __%. (Note:  this level must show improvement from room air level done with ambulation.)  If patients saturation on room air with ambulation is 88% or below AND patient shows improvement with oxygen during ambulation, they will qualify for home oxygen and you can stop. If patient does not drop below 89%, then patient should have an overnight oximetry trending on room air to see if level falls below 88%. Complete level in Step 3 below. 3. Room air overnight oximetry level 88 % for___  cumulative minutes. If patients room air oxygen level is < 89% for at least 5 cumulative minutes, patient will qualify for home oxygen and you can stop. (Attach Night Trending Report)    Complete order below: Diagnosis:_COPD___  Home oxygen at:  Length of Need: X? Lifetime ?  3 Months     _2__lpm or __%   via  [x] nasal cannula  []mask  [] other: Conserving Device         [x]continuous [x]  with activity  [x]  Nocturnal   [x] Portable Tanks [x]  Concentrator  [] Conserving Device        Therapist Signature:__Eugene Lim RRT____     Date:  _12/06/2021__  Physician Signature:  __Electronically Signed in EMR_    Date:___  Physician Printed Name:  Tiffanie Beck MD  NPI:  [id number]    [x] Patient Qualifies      [] Patient Does NOT qualify

## 2021-12-06 NOTE — DISCHARGE INSTR - COC
Continuity of Care Form    Patient Name: Caleb Reyes   :  1945  MRN:  [id number]    Admit date:  2021  Discharge date:  ***    Code Status Order: Full Code   Advance Directives:      Admitting Physician:  No admitting provider for patient encounter.   PCP: Bill Dewitt MD    Discharging Nurse: [facility] Unit/Room#: 1125/1125-A  Discharging Unit Phone Number: ***    Emergency Contact:   Extended Emergency Contact Information  Primary Emergency Contact: Marianne Jones)   [address] Phone: [phone]  Mobile Phone: [phone]  Relation: Brother/Sister    Past Surgical History:  Past Surgical History:   Procedure Laterality Date    ABDOMEN SURGERY      BREAST SURGERY      CHOLECYSTECTOMY      HYSTERECTOMY      OTHER SURGICAL HISTORY  2018    exp lap with TUNG       Immunization History:   Immunization History   Administered Date(s) Administered    Influenza Virus Vaccine 2012, 2013, 10/09/2014, 10/13/2014    Influenza, High Dose (Fluzone 65 yrs and older) 2016, 10/03/2017, 10/17/2018    Influenza, Triv, inactivated, subunit, adjuvanted, IM (Fluad 65 yrs and older) 10/24/2019    Pneumococcal Conjugate 13-valent (Edyth [place]) 10/13/2017    Pneumococcal Polysaccharide (Qzffdufvp30) 2019    Tdap (Boostrix, Adacel) 2017       Active Problems:  Patient Active Problem List   Diagnosis Code    Essential hypertension I10    Mixed hyperlipidemia E78.2    Anxiety F41.9    Primary insomnia F51.01    Lung nodule R91.1    Other emphysema ([address] J43.8    Former smoker Z87.891    Chronic obstructive pulmonary disease ([address] J44.9    Vaginal dryness N89.8    H/O total hysterectomy Z90.710    Vaginal atrophy N95.2    Leg cramping R25.2    Bowel obstruction ([address] K56.609    SBO (small bowel obstruction) ([address] K56.609    Adynamic ileus ([address] K56.0    Tobacco abuse Z72.0    Lower abdominal pain R10.30    Nausea R11.0    Abnormal CT of the abdomen BMI 16.25 kg/m²     Last documented pain score (0-10 scale): Pain Level: 0  Last Weight:   Wt Readings from Last 1 Encounters:   21 91 lb 11.4 oz (41.6 kg)     Mental Status:  {IP PT MENTAL STATUS:}    IV Access:  { MITCHELL IV ACCESS:218719173}    Nursing Mobility/ADLs:  Walking   {CHP DME GWUF:695249289}  Transfer  {CHP DME SPCC:476518191}  Bathing  {CHP DME PFNP:863809276}  Dressing  {CHP DME JIAH:717678024}  Toileting  {CHP DME GYJB:568293834}  Feeding  {P DME YIDU:512558907}  Med Admin  {P DME GZUH:252875297}  Med Delivery   { MITCHELL MED Delivery:131663977}    Wound Care Documentation and Therapy:  Incision 18 Abdomen  (Active)   Number of days: 8505        Elimination:  Continence: Bowel: {YES / ZZ:33752}  Bladder: {YES / YS:50841}  Urinary Catheter: {Urinary Catheter:554353355}   Colostomy/Ileostomy/Ileal Conduit: {YES / WH:60626}       Date of Last BM: ***    Intake/Output Summary (Last 24 hours) at 2021 1712  Last data filed at 2021 0845  Gross per 24 hour   Intake 10 ml   Output --   Net 10 ml     I/O last 3 completed shifts:   In: 10 [I.V.:10]  Out: -     Safety Concerns:     508 GCW Safety Concerns:731992082}    Impairments/Disabilities:      508 GCW Impairments/Disabilities:313648414}    Nutrition Therapy:  Current Nutrition Therapy:   508 GCW Diet List:040292270}    Routes of Feeding: {P DME Other Feedings:427337249}  Liquids: {Slp liquid thickness:60730}  Daily Fluid Restriction: {CHP DME Yes amt example:179366834}  Last Modified Barium Swallow with Video (Video Swallowing Test): {Done Not Done UNOM:906832703}    Treatments at the Time of Hospital Discharge:   Respiratory Treatments: ***  Oxygen Therapy:  {Therapy; copd oxygen:03832}  Ventilator:    { CC Vent WMQQ:689473225}    Rehab Therapies: {THERAPEUTIC INTERVENTION:7565748018}  Weight Bearing Status/Restrictions: [address] Shruthi RIZO Weight Bearin}  Other Medical Equipment (for information only, NOT a DME order):

## 2021-12-06 NOTE — PROGRESS NOTES
Physician Progress Note      Latia Haynes  CSN #:                  [id number]  :                       1945  ADMIT DATE:       2021 1:37 PM  DISCH DATE:  RESPONDING  PROVIDER #:        Marla Lora MD          QUERY TEXT:    Pt admitted with PNA and has malnutrition documented. Please further specify   type of malnutrition with documentation in the medical record. The medical record reflects the following:  Risk Factors: COPD, recent weight loss. Clinical Indicators: Dietitian noted in  progress note \"  Severe   malnutrition related to impaired respiratory function as evidenced by weight   loss, severe loss of subcutaneous fat, severe muscle loss\" BMI 15.5  Treatment: Dietitian consult, oral nutrition supplement, regular diet. ASPEN Criteria:    https://aspenjournals. onlinelibrary. cote. com/doi/full/10.1177/560495641755761  5    Thank you  MISHEL ChapmanN, RN, CDS  [phone]  Options provided:  -- Severe Malnutrition  -- Other - I will add my own diagnosis  -- Disagree - Not applicable / Not valid  -- Disagree - Clinically unable to determine / Unknown  -- Refer to Clinical Documentation Reviewer    PROVIDER RESPONSE TEXT:    Provider disagreed with this query.   Patient has pulmonary cachexia    Query created by: Jonnathan Mason on 2021 2:35 PM      Electronically signed by:  Marla Lora MD 2021 2:41 PM

## 2021-12-06 NOTE — PROGRESS NOTES
Spoke with Rich - Pulmonary Function. Updated him of pt discharge. Pt. Did qualify for home O2. Per patient she will not be able to leave until after 1630 when her daughter gets off work. Rola Tan RN updated.

## 2021-12-07 ENCOUNTER — CARE COORDINATION (OUTPATIENT)
Dept: CASE MANAGEMENT | Age: 76
End: 2021-12-07

## 2021-12-07 DIAGNOSIS — J18.9 PNEUMONIA OF BOTH LUNGS DUE TO INFECTIOUS ORGANISM, UNSPECIFIED PART OF LUNG: Primary | ICD-10-CM

## 2021-12-07 PROCEDURE — 1111F DSCHRG MED/CURRENT MED MERGE: CPT | Performed by: FAMILY MEDICINE

## 2021-12-07 NOTE — CARE COORDINATION
[facility] Transitions Initial Follow Up Call    Call within 2 business days of discharge: Yes    Patient: Nima Campos Patient : 1945   MRN: [id number]  Reason for Admission: Multifocal pneumonia  Discharge Date: 21 RARS: Readmission Risk Score: 14.5 ( )      Last Discharge [facility]       Complaint Diagnosis Description Type Department Provider    21 Shortness of Breath; Cough Multifocal pneumonia . .. ED to Hosp-Admission (Discharged) (ADMITTED) Altagracia Dugan MD; Clarissa Fuller, ... Transitions of Care Initial Call:    Called and spoke to patient. Patient states she is doing better. Still has non productive cough. Denies SOB - uses Oxygen 2/L continuously. SPO2 94%. Denies SOB, wheezing, chest pain with inspiration or expiration, fatigue, fever or chills. Reviewed medications with Patient. Continues on antibiotics. Confirmed Patient obtained and is taking medications as directed. There are no questions concerning medications at this time. Medication education provided needed, questions answered, verbalizes understanding. Stressed importance of medication compliance. Advised contacting Pharmacy/MD for refill needs. Expresses understanding. 1111F Medication Reconciliation completed. Routed to PCP. Patient has fair appetite and is drinking adequate fluids. Normal bladder and bowel elimination patterns. Offered patient assistance making PCP appointment, patient declined. Patient states she will make PCP appointment herself. Patient states will see Pulmonologist 2021. Instructed patient that PCP will need to be seen within 7 days of discharge. Expresses understanding. Explained the Transition to Home Program to patient. Patient is called after discharge by CTN to make sure all needs are met and to see if patient has any questions or problems. Expresses understanding. Will continue to follow.      Jennifer Soria, [facility]  [employer] Yassine Christianson / Jaden Dietz Coordinator     Was this an external facility discharge? No Discharge Facility: [facility]    Challenges to be reviewed by the provider   Additional needs identified to be addressed with provider No  none             Method of communication with provider : none      Advance Care Planning:   Does patient have an Advance Directive:  not on file. Was this a readmission? No  Patient stated reason for admission: Multifocal pneumonia  Patients top risk factors for readmission: lack of knowledge about disease  medical condition-Multifocal pneumonia  medication management    Care Transition Nurse (CTN) contacted the patient by telephone to perform post hospital discharge assessment. Verified name and  with patient as identifiers. Provided introduction to self, and explanation of the CTN role. CTN reviewed discharge instructions, medical action plan and red flags with patient who verbalized understanding. Patient given an opportunity to ask questions and does not have any further questions or concerns at this time. Were discharge instructions available to patient? Yes. Reviewed appropriate site of care based on symptoms and resources available to patient including: PCP, Specialist and When to call 911. The patient agrees to contact the PCP office for questions related to their healthcare. Medication reconciliation was performed with patient, who verbalizes understanding of administration of home medications. Advised obtaining a 90-day supply of all daily and as-needed medications. Reviewed and educated patient on any new and changed medications related to discharge diagnosis     Was patient discharged with a pulse oximeter? Yes     Discussed and confirmed pulse oximeter discharge instructions and when to notify provider or seek emergency care. LPN CC provided contact information.  Plan for follow-up call in 5-7 days based on severity of symptoms and risk factors.        Non-face-to-face services provided:  Obtained and reviewed discharge summary and/or continuity of care documents    Care Transitions 24 Hour Call    Schedule Follow Up Appointment with PCP: Maria Eugenia Luo you have any ongoing symptoms?: Yes  Patient-reported symptoms: Cough, Fatigue  Do you have a copy of your discharge instructions?: Yes  Do you have all of your prescriptions and are they filled?: Yes  Have you been contacted by a [facility] Pharmacist?: No  Have you scheduled your follow up appointment?: No  Were you discharged with any Home Care or Post Acute Services: No  Patient Home Equipment: Oxygen  Do you have support at home?: Alone  Do you feel like you have everything you need to keep you well at home?: Yes  Are you an active caregiver in your home?: No  Care Transitions Interventions  No Identified Needs         Follow Up  Future Appointments   Date Time Provider Maria Del Carmen Carson   12/28/2021 12:30 PM Jim Muse MD [facility]   3/22/2022  2:15 PM Randy Salinas MD [facility]       Kiara Garay LPN

## 2021-12-15 ENCOUNTER — CARE COORDINATION (OUTPATIENT)
Dept: CASE MANAGEMENT | Age: 76
End: 2021-12-15

## 2021-12-15 NOTE — CARE COORDINATION
[facility] Transitions Follow Up Call    12/15/2021    Patient: Marizol Rodriguez  Patient : 1945   MRN: [id number]  Reason for Admission: Multifocal pneumonia  Discharge Date: 21 RARS: Readmission Risk Score: 14.5 ( )    Care Transitions Follow Up Call:    Patient states she is still SOB as before. Uses oxygen 2/L continuously. SPO2 with O2 93-95% Spo2 83% with no oxygen on. Instructed patient to continue wearing oxygen at 2/L. Patient has dry cough. Denies wheezing, chest pain with inspiration or expiration, fatigue, fever or chills. Patient confirms all medications are available and are being taken as directed. Patient states that she has pain in her mouth. Is using Peroxydol OTC with some relief. Suggested patient call PCP. Suggested patient gargle with warm salt water. Patient refuses to see PCP (not happy with PCP). Is seeing Pulmonologist 21. Will continue to follow. Howard Gómez, N    [phone]  Roly Apo / [facility] Coordinator      Needs to be reviewed by the provider   Additional needs identified to be addressed with provider No  none             Method of communication with provider : none      Care Transition Nurse (CTN) contacted the patient by telephone to follow up after admission on 2021. Verified name and  with patient as identifiers. Addressed changes since last contact: none  Discussed follow-up appointments. If no appointment was previously scheduled, appointment scheduling offered: Yes   Is follow up appointment scheduled within 7 days of discharge? No    Advance Care Planning:   Does patient have an Advance Directive:  reviewed and current. CTN reviewed discharge instructions, medical action plan and red flags with patient and discussed any barriers to care and/or understanding of plan of care after discharge.    Discussed appropriate site of care based on symptoms and resources available to patient including: PCP and When to call 911. The patient agrees to contact the PCP office for questions related to their healthcare. Patients top risk factors for readmission: lack of knowledge about disease  Interventions to address risk factors: Obtained and reviewed discharge summary and/or continuity of care documents    Non-[facility] follow up appointment(s): Pulmonologist 12/28/21    CTN provided contact information for future needs. Plan for follow-up call in 10-14 days based on severity of symptoms and risk factors. Plan for next call: symptom management-Multifocal pneumonia           Care Transitions Subsequent and Final Call    Subsequent and Final Calls  Do you have any ongoing symptoms?: Yes  Patient-reported symptoms: Shortness of Breath, Cough, Other  Have your medications changed?: No  Do you have any questions related to your medications?: No  Do you currently have any active services?: No  Do you have any needs or concerns that I can assist you with?: No  Identified Barriers: None  Care Transitions Interventions  Other Interventions:            Follow Up  Future Appointments   Date Time Provider Maria Del Carmen Carson   12/28/2021 12:30 PM Jazmin Simons MD [facility]   3/22/2022  2:15 PM Elba Beard MD [facility]       Ash Sands LPN

## 2021-12-16 ENCOUNTER — TELEPHONE (OUTPATIENT)
Dept: FAMILY MEDICINE CLINIC | Age: 76
End: 2021-12-16

## 2021-12-16 NOTE — TELEPHONE ENCOUNTER
----- Message from [address] [place] [address] [facility] sent at 12/16/2021  8:30 AM EST -----  Subject: Message to Provider    QUESTIONS  Information for Provider? URGENT Pt. Natty Jackson recent released from   hospital; had a follow up call pt told nurse that she is unable to wear   her dentures her gums are sore and the inside of her lips burns and hurts. Nurse told patient to call and request \"Rinse & Spit\"? ? Please call   patient she hasn't been able to eat for 3 days. She has been using warm   salt water and peroxyl with no results.   -

## 2021-12-17 NOTE — TELEPHONE ENCOUNTER
RX was printed will fax to pharmacy     faxed : [address], [address] [facility] [address] - P [phone] - F [phone]

## 2021-12-17 NOTE — TELEPHONE ENCOUNTER
If not able to eat need to go to the ED, or need to be seen she can go to the [facility]

## 2021-12-29 ENCOUNTER — TELEPHONE (OUTPATIENT)
Dept: ONCOLOGY | Age: 76
End: 2021-12-29

## 2021-12-29 NOTE — TELEPHONE ENCOUNTER
Called patient to schedule hospital follow up, patient states she does not wish to schedule at this time will call back after appointment with Dr. Augusto Ray.

## 2021-12-30 ENCOUNTER — CARE COORDINATION (OUTPATIENT)
Dept: CASE MANAGEMENT | Age: 76
End: 2021-12-30

## 2021-12-30 NOTE — CARE COORDINATION
[facility] Transitions Follow Up Call    2021    Patient: Sandra Molina  Patient : 1945   MRN: [id number]  Reason for Admission: Multifocal pneumonia  Discharge Date: 21 RARS: Readmission Risk Score: 14.5 ( )    Care Transitions Follow Up Call:    Patient states she is doing better. Continues using oxygen most of the time and continuously at night. SpO2 with oxygen 2/L 95-96% SpO2 with no oxygen 88%. Encouraged to continue wearing oxygen. Denies wheezing, cough, chest pain with inspiration or expiration, fatigue, fever or chills. Saw new PCP 2021 and Pulmonologist 2021. Patient had Transitional Follow up appointment. 2021 Pulmonologist  Patient Denies Transportation, Home, or Medication needs or concerns at this time. Instructed patient that this is the Final Transition Call and to call their Specialist/PCP for any problems or issues that may occur. Expresses understanding. Theomi Ferrer LPN    [phone]  Robson Honeycutt / Jaden Dietz Coordinator        Needs to be reviewed by the provider   Additional needs identified to be addressed with provider No  none             Method of communication with provider : none      Care Transition Nurse (CTN) contacted the patient by telephone to follow up after admission on 2021. Verified name and  with patient as identifiers. Addressed changes since last contact: none  Discussed follow-up appointments. If no appointment was previously scheduled, appointment scheduling offered: Yes   Is follow up appointment scheduled within 7 days of discharge? Yes    Advance Care Planning:   Does patient have an Advance Directive:  reviewed and current. CTN reviewed discharge instructions, medical action plan and red flags with patient and discussed any barriers to care and/or understanding of plan of care after discharge.    Discussed appropriate site of care based on symptoms and resources available to patient including: When to call 911. The patient agrees to contact the PCP office for questions related to their healthcare. Patients top risk factors for readmission: lack of knowledge about disease  medical condition-Multifocal pneumonia  medication management  Interventions to address risk factors: Obtained and reviewed discharge summary and/or continuity of care documents    Non[facility] follow up appointment(s): 12/28/21    CTN provided contact information for future needs. No further follow-up call identified based on severity of symptoms and risk factors. Plan for next call:              Care Transitions Subsequent and Final Call    Subsequent and Final Calls  Do you have any ongoing symptoms?: No  Have your medications changed?: No  Do you have any questions related to your medications?: No  Do you currently have any active services?: No  Do you have any needs or concerns that I can assist you with?: No  Identified Barriers: None  Care Transitions Interventions  Other Interventions:            Follow Up  Future Appointments   Date Time Provider Maria Del Carmen Carson   1/14/2022  7:30 AM [facility] [place] [facility]   1/20/2022 12:15 PM MD ALLEGRA Morales   3/22/2022  2:15 PM MD Radha Lott ROMAIN Elliott LPN

## 2022-01-14 ENCOUNTER — HOSPITAL ENCOUNTER (OUTPATIENT)
Dept: PET IMAGING | Age: 77
Discharge: HOME | End: 2022-01-14

## 2022-01-14 DIAGNOSIS — R91.1 LUNG NODULE: ICD-10-CM

## 2022-01-17 ENCOUNTER — HOSPITAL ENCOUNTER (OUTPATIENT)
Dept: PET IMAGING | Age: 77
Discharge: HOME | End: 2022-01-17
Payer: MEDICARE

## 2022-01-17 PROCEDURE — 3430000000 HC RX DIAGNOSTIC RADIOPHARMACEUTICAL: Performed by: INTERNAL MEDICINE

## 2022-01-17 PROCEDURE — 2580000003 HC RX 258: Performed by: INTERNAL MEDICINE

## 2022-01-17 PROCEDURE — A9552 F18 FDG: HCPCS | Performed by: INTERNAL MEDICINE

## 2022-01-17 PROCEDURE — 78815 PET IMAGE W/CT SKULL-THIGH: CPT

## 2022-01-17 RX ORDER — FLUDEOXYGLUCOSE F 18 200 MCI/ML
16.4 INJECTION, SOLUTION INTRAVENOUS
Status: COMPLETED | OUTPATIENT
Start: 2022-01-17 | End: 2022-01-17

## 2022-01-17 RX ORDER — SODIUM CHLORIDE 0.9 % (FLUSH) 0.9 %
10 SYRINGE (ML) INJECTION PRN
Status: COMPLETED | OUTPATIENT
Start: 2022-01-17 | End: 2022-01-17

## 2022-01-17 RX ADMIN — FLUDEOXYGLUCOSE F 18 16.4 MILLICURIE: 200 INJECTION, SOLUTION INTRAVENOUS at 15:06

## 2022-01-17 RX ADMIN — SODIUM CHLORIDE, PRESERVATIVE FREE 10 ML: 5 INJECTION INTRAVENOUS at 15:06

## 2022-03-08 ENCOUNTER — HOSPITAL ENCOUNTER (OUTPATIENT)
Dept: CT IMAGING | Age: 77
Discharge: HOME | End: 2022-03-08
Payer: MEDICARE

## 2022-03-08 DIAGNOSIS — J18.9 PNEUMONIA OF RIGHT LUNG DUE TO INFECTIOUS ORGANISM, UNSPECIFIED PART OF LUNG: ICD-10-CM

## 2022-03-08 PROCEDURE — 71250 CT THORAX DX C-: CPT

## 2024-04-16 ENCOUNTER — HOSPITAL ENCOUNTER (INPATIENT)
Age: 79
LOS: 5 days | Discharge: HOME HEALTH | DRG: 193 | End: 2024-04-21
Attending: STUDENT IN AN ORGANIZED HEALTH CARE EDUCATION/TRAINING PROGRAM | Admitting: STUDENT IN AN ORGANIZED HEALTH CARE EDUCATION/TRAINING PROGRAM
Payer: MEDICARE

## 2024-04-16 PROBLEM — J18.9 COMMUNITY ACQUIRED PNEUMONIA, UNSPECIFIED LATERALITY: Status: ACTIVE | Noted: 2024-04-16

## 2024-04-16 PROCEDURE — 94660 CPAP INITIATION&MGMT: CPT

## 2024-04-16 PROCEDURE — 5A09457 ASSISTANCE WITH RESPIRATORY VENTILATION, 24-96 CONSECUTIVE HOURS, CONTINUOUS POSITIVE AIRWAY PRESSURE: ICD-10-PCS | Performed by: STUDENT IN AN ORGANIZED HEALTH CARE EDUCATION/TRAINING PROGRAM

## 2024-04-16 PROCEDURE — 2580000003 HC RX 258: Performed by: STUDENT IN AN ORGANIZED HEALTH CARE EDUCATION/TRAINING PROGRAM

## 2024-04-16 PROCEDURE — 2060000000 HC ICU INTERMEDIATE R&B

## 2024-04-16 PROCEDURE — 6370000000 HC RX 637 (ALT 250 FOR IP): Performed by: STUDENT IN AN ORGANIZED HEALTH CARE EDUCATION/TRAINING PROGRAM

## 2024-04-16 PROCEDURE — 94640 AIRWAY INHALATION TREATMENT: CPT

## 2024-04-16 PROCEDURE — 2700000000 HC OXYGEN THERAPY PER DAY

## 2024-04-16 RX ORDER — PANTOPRAZOLE SODIUM 40 MG/1
40 TABLET, DELAYED RELEASE ORAL
Status: DISCONTINUED | OUTPATIENT
Start: 2024-04-17 | End: 2024-04-21 | Stop reason: HOSPADM

## 2024-04-16 RX ORDER — CLONAZEPAM 0.5 MG/1
0.5 TABLET ORAL EVERY 12 HOURS PRN
Status: COMPLETED | OUTPATIENT
Start: 2024-04-16 | End: 2024-04-19

## 2024-04-16 RX ORDER — PAROXETINE HYDROCHLORIDE 20 MG/1
20 TABLET, FILM COATED ORAL DAILY
Status: ON HOLD | COMMUNITY
End: 2024-04-21 | Stop reason: HOSPADM

## 2024-04-16 RX ORDER — POTASSIUM CHLORIDE 7.45 MG/ML
10 INJECTION INTRAVENOUS PRN
Status: DISCONTINUED | OUTPATIENT
Start: 2024-04-16 | End: 2024-04-21 | Stop reason: HOSPADM

## 2024-04-16 RX ORDER — NICOTINE 21 MG/24HR
1 PATCH, TRANSDERMAL 24 HOURS TRANSDERMAL DAILY PRN
Status: DISCONTINUED | OUTPATIENT
Start: 2024-04-16 | End: 2024-04-21 | Stop reason: HOSPADM

## 2024-04-16 RX ORDER — ACETAMINOPHEN 325 MG/1
650 TABLET ORAL EVERY 6 HOURS PRN
Status: DISCONTINUED | OUTPATIENT
Start: 2024-04-16 | End: 2024-04-21 | Stop reason: HOSPADM

## 2024-04-16 RX ORDER — CHOLECALCIFEROL (VITAMIN D3) 125 MCG
10 CAPSULE ORAL NIGHTLY
Status: DISCONTINUED | OUTPATIENT
Start: 2024-04-16 | End: 2024-04-21 | Stop reason: HOSPADM

## 2024-04-16 RX ORDER — LANOLIN ALCOHOL/MO/W.PET/CERES
3 CREAM (GRAM) TOPICAL NIGHTLY PRN
Status: DISCONTINUED | OUTPATIENT
Start: 2024-04-16 | End: 2024-04-21 | Stop reason: HOSPADM

## 2024-04-16 RX ORDER — SODIUM CHLORIDE 0.9 % (FLUSH) 0.9 %
5-40 SYRINGE (ML) INJECTION EVERY 12 HOURS SCHEDULED
Status: DISCONTINUED | OUTPATIENT
Start: 2024-04-16 | End: 2024-04-21 | Stop reason: HOSPADM

## 2024-04-16 RX ORDER — POLYETHYLENE GLYCOL 3350 17 G/17G
17 POWDER, FOR SOLUTION ORAL DAILY PRN
Status: DISCONTINUED | OUTPATIENT
Start: 2024-04-16 | End: 2024-04-21 | Stop reason: HOSPADM

## 2024-04-16 RX ORDER — SODIUM CHLORIDE 9 MG/ML
INJECTION, SOLUTION INTRAVENOUS PRN
Status: DISCONTINUED | OUTPATIENT
Start: 2024-04-16 | End: 2024-04-21 | Stop reason: HOSPADM

## 2024-04-16 RX ORDER — ENOXAPARIN SODIUM 100 MG/ML
40 INJECTION SUBCUTANEOUS EVERY EVENING
Status: DISCONTINUED | OUTPATIENT
Start: 2024-04-17 | End: 2024-04-17

## 2024-04-16 RX ORDER — TRAZODONE HYDROCHLORIDE 50 MG/1
50 TABLET ORAL NIGHTLY
Status: DISCONTINUED | OUTPATIENT
Start: 2024-04-16 | End: 2024-04-21 | Stop reason: HOSPADM

## 2024-04-16 RX ORDER — MAGNESIUM SULFATE IN WATER 40 MG/ML
2000 INJECTION, SOLUTION INTRAVENOUS PRN
Status: DISCONTINUED | OUTPATIENT
Start: 2024-04-16 | End: 2024-04-21 | Stop reason: HOSPADM

## 2024-04-16 RX ORDER — ESOMEPRAZOLE MAGNESIUM 40 MG/1
40 GRANULE, DELAYED RELEASE ORAL DAILY
COMMUNITY

## 2024-04-16 RX ORDER — SERTRALINE HYDROCHLORIDE 25 MG/1
100 TABLET, FILM COATED ORAL DAILY
COMMUNITY

## 2024-04-16 RX ORDER — BUDESONIDE AND FORMOTEROL FUMARATE DIHYDRATE 80; 4.5 UG/1; UG/1
1 AEROSOL RESPIRATORY (INHALATION)
Status: DISCONTINUED | OUTPATIENT
Start: 2024-04-16 | End: 2024-04-21 | Stop reason: HOSPADM

## 2024-04-16 RX ORDER — MONTELUKAST SODIUM 10 MG/1
10 TABLET ORAL NIGHTLY
Status: DISCONTINUED | OUTPATIENT
Start: 2024-04-16 | End: 2024-04-21 | Stop reason: HOSPADM

## 2024-04-16 RX ORDER — OXYBUTYNIN CHLORIDE 10 MG/1
10 TABLET, EXTENDED RELEASE ORAL DAILY
COMMUNITY

## 2024-04-16 RX ORDER — ONDANSETRON 4 MG/1
4 TABLET, ORALLY DISINTEGRATING ORAL EVERY 8 HOURS PRN
Status: DISCONTINUED | OUTPATIENT
Start: 2024-04-16 | End: 2024-04-21 | Stop reason: HOSPADM

## 2024-04-16 RX ORDER — IPRATROPIUM BROMIDE AND ALBUTEROL SULFATE 2.5; .5 MG/3ML; MG/3ML
1 SOLUTION RESPIRATORY (INHALATION)
Status: DISCONTINUED | OUTPATIENT
Start: 2024-04-17 | End: 2024-04-18

## 2024-04-16 RX ORDER — OXYBUTYNIN CHLORIDE 10 MG/1
10 TABLET, EXTENDED RELEASE ORAL DAILY
Status: DISCONTINUED | OUTPATIENT
Start: 2024-04-17 | End: 2024-04-21 | Stop reason: HOSPADM

## 2024-04-16 RX ORDER — ONDANSETRON 2 MG/ML
4 INJECTION INTRAMUSCULAR; INTRAVENOUS EVERY 6 HOURS PRN
Status: DISCONTINUED | OUTPATIENT
Start: 2024-04-16 | End: 2024-04-21 | Stop reason: HOSPADM

## 2024-04-16 RX ORDER — ACETAMINOPHEN 650 MG/1
650 SUPPOSITORY RECTAL EVERY 6 HOURS PRN
Status: DISCONTINUED | OUTPATIENT
Start: 2024-04-16 | End: 2024-04-21 | Stop reason: HOSPADM

## 2024-04-16 RX ORDER — TIZANIDINE 4 MG/1
4 TABLET ORAL NIGHTLY
Status: DISCONTINUED | OUTPATIENT
Start: 2024-04-16 | End: 2024-04-16

## 2024-04-16 RX ORDER — SODIUM CHLORIDE 0.9 % (FLUSH) 0.9 %
5-40 SYRINGE (ML) INJECTION PRN
Status: DISCONTINUED | OUTPATIENT
Start: 2024-04-16 | End: 2024-04-21 | Stop reason: HOSPADM

## 2024-04-16 RX ADMIN — MONTELUKAST 10 MG: 10 TABLET, FILM COATED ORAL at 23:30

## 2024-04-16 RX ADMIN — Medication 10 MG: at 23:31

## 2024-04-16 RX ADMIN — TRAZODONE HYDROCHLORIDE 50 MG: 50 TABLET ORAL at 23:30

## 2024-04-16 RX ADMIN — SODIUM CHLORIDE, PRESERVATIVE FREE 10 ML: 5 INJECTION INTRAVENOUS at 23:33

## 2024-04-16 RX ADMIN — IPRATROPIUM BROMIDE AND ALBUTEROL SULFATE 1 DOSE: 2.5; .5 SOLUTION RESPIRATORY (INHALATION) at 23:40

## 2024-04-17 LAB
ALBUMIN SERPL-MCNC: 3.5 GM/DL (ref 3.4–5)
ALP BLD-CCNC: 132 IU/L (ref 40–128)
ALT SERPL-CCNC: 32 U/L (ref 10–40)
ANION GAP SERPL CALCULATED.3IONS-SCNC: 13 MMOL/L (ref 7–16)
ANISOCYTOSIS: ABNORMAL
AST SERPL-CCNC: 35 IU/L (ref 15–37)
BANDED NEUTROPHILS ABSOLUTE COUNT: 1.16 K/CU MM
BANDED NEUTROPHILS RELATIVE PERCENT: 11 % (ref 5–11)
BASE EXCESS MIXED: 11.1 (ref 0–3)
BASE EXCESS MIXED: 12.4 (ref 0–3)
BASE EXCESS MIXED: 7.5 (ref 0–3)
BILIRUB SERPL-MCNC: 0.4 MG/DL (ref 0–1)
BUN SERPL-MCNC: 25 MG/DL (ref 6–23)
CALCIUM SERPL-MCNC: 8.3 MG/DL (ref 8.3–10.6)
CHLORIDE BLD-SCNC: 101 MMOL/L (ref 99–110)
CO2: 28 MMOL/L (ref 21–32)
COMMENT: ABNORMAL
CREAT SERPL-MCNC: 0.5 MG/DL (ref 0.6–1.1)
DIFFERENTIAL TYPE: ABNORMAL
GFR SERPL CREATININE-BSD FRML MDRD: >90 ML/MIN/1.73M2
GLUCOSE SERPL-MCNC: 67 MG/DL (ref 70–99)
HCO3 VENOUS: 34.1 MMOL/L (ref 22–29)
HCO3 VENOUS: 39.3 MMOL/L (ref 22–29)
HCO3 VENOUS: 42.8 MMOL/L (ref 22–29)
HCT VFR BLD CALC: 43.9 % (ref 37–47)
HEMOGLOBIN: 12.5 GM/DL (ref 12.5–16)
INR BLD: 1.2 INDEX
L PNEUMO AG UR QL IA: NEGATIVE
LYMPHOCYTES ABSOLUTE: 0.7 K/CU MM
LYMPHOCYTES RELATIVE PERCENT: 7 % (ref 24–44)
MCH RBC QN AUTO: 28.4 PG (ref 27–31)
MCHC RBC AUTO-ENTMCNC: 28.5 % (ref 32–36)
MCV RBC AUTO: 99.8 FL (ref 78–100)
MONOCYTES ABSOLUTE: 0.6 K/CU MM
MONOCYTES RELATIVE PERCENT: 6 % (ref 0–4)
MRSA, DNA, NASAL: NEGATIVE
NEUTROPHILS RELATIVE PERCENT: 76 % (ref 36–66)
O2 SAT, VEN: 80.6 % (ref 50–70)
O2 SAT, VEN: 89.1 % (ref 50–70)
O2 SAT, VEN: 90.9 % (ref 50–70)
PCO2, VEN: 55 MMHG (ref 41–51)
PCO2, VEN: 68 MMHG (ref 41–51)
PCO2, VEN: 87 MMHG (ref 41–51)
PDW BLD-RTO: 16.3 % (ref 11.7–14.9)
PH VENOUS: 7.3 (ref 7.32–7.43)
PH VENOUS: 7.37 (ref 7.32–7.43)
PH VENOUS: 7.4 (ref 7.32–7.43)
PLATELET # BLD: 176 K/CU MM (ref 140–440)
PMV BLD AUTO: 10.6 FL (ref 7.5–11.1)
PO2, VEN: 154 MMHG (ref 28–48)
PO2, VEN: 226 MMHG (ref 28–48)
PO2, VEN: 49 MMHG (ref 28–48)
POLYCHROMASIA: ABNORMAL
POTASSIUM SERPL-SCNC: 5 MMOL/L (ref 3.5–5.1)
PROCALCITONIN SERPL-MCNC: 0.68 NG/ML
PROTHROMBIN TIME: 15.7 SECONDS (ref 11.7–14.5)
RBC # BLD: 4.4 M/CU MM (ref 4.2–5.4)
RBC # BLD: ABNORMAL 10*6/UL
S PNEUM AG CSF QL: NORMAL
SEGMENTED NEUTROPHILS ABSOLUTE COUNT: 8 K/CU MM
SODIUM BLD-SCNC: 142 MMOL/L (ref 135–145)
SPECIMEN DESCRIPTION: NORMAL
TOTAL PROTEIN: 5.2 GM/DL (ref 6.4–8.2)
TSH SERPL DL<=0.005 MIU/L-ACNC: 0.56 UIU/ML (ref 0.27–4.2)
WBC # BLD: 10.5 K/CU MM (ref 4–10.5)

## 2024-04-17 PROCEDURE — 94660 CPAP INITIATION&MGMT: CPT

## 2024-04-17 PROCEDURE — 85027 COMPLETE CBC AUTOMATED: CPT

## 2024-04-17 PROCEDURE — 94761 N-INVAS EAR/PLS OXIMETRY MLT: CPT

## 2024-04-17 PROCEDURE — 87205 SMEAR GRAM STAIN: CPT

## 2024-04-17 PROCEDURE — 80053 COMPREHEN METABOLIC PANEL: CPT

## 2024-04-17 PROCEDURE — 2580000003 HC RX 258: Performed by: STUDENT IN AN ORGANIZED HEALTH CARE EDUCATION/TRAINING PROGRAM

## 2024-04-17 PROCEDURE — 6370000000 HC RX 637 (ALT 250 FOR IP): Performed by: STUDENT IN AN ORGANIZED HEALTH CARE EDUCATION/TRAINING PROGRAM

## 2024-04-17 PROCEDURE — 84145 PROCALCITONIN (PCT): CPT

## 2024-04-17 PROCEDURE — 87641 MR-STAPH DNA AMP PROBE: CPT

## 2024-04-17 PROCEDURE — 6360000002 HC RX W HCPCS: Performed by: STUDENT IN AN ORGANIZED HEALTH CARE EDUCATION/TRAINING PROGRAM

## 2024-04-17 PROCEDURE — 87040 BLOOD CULTURE FOR BACTERIA: CPT

## 2024-04-17 PROCEDURE — 97166 OT EVAL MOD COMPLEX 45 MIN: CPT

## 2024-04-17 PROCEDURE — 97535 SELF CARE MNGMENT TRAINING: CPT

## 2024-04-17 PROCEDURE — 2060000000 HC ICU INTERMEDIATE R&B

## 2024-04-17 PROCEDURE — 85610 PROTHROMBIN TIME: CPT

## 2024-04-17 PROCEDURE — 2700000000 HC OXYGEN THERAPY PER DAY

## 2024-04-17 PROCEDURE — 51701 INSERT BLADDER CATHETER: CPT

## 2024-04-17 PROCEDURE — 85007 BL SMEAR W/DIFF WBC COUNT: CPT

## 2024-04-17 PROCEDURE — 51798 US URINE CAPACITY MEASURE: CPT

## 2024-04-17 PROCEDURE — 87070 CULTURE OTHR SPECIMN AEROBIC: CPT

## 2024-04-17 PROCEDURE — 97116 GAIT TRAINING THERAPY: CPT

## 2024-04-17 PROCEDURE — 84443 ASSAY THYROID STIM HORMONE: CPT

## 2024-04-17 PROCEDURE — 36415 COLL VENOUS BLD VENIPUNCTURE: CPT

## 2024-04-17 PROCEDURE — 87449 NOS EACH ORGANISM AG IA: CPT

## 2024-04-17 PROCEDURE — 94640 AIRWAY INHALATION TREATMENT: CPT

## 2024-04-17 PROCEDURE — 82805 BLOOD GASES W/O2 SATURATION: CPT

## 2024-04-17 PROCEDURE — 87899 AGENT NOS ASSAY W/OPTIC: CPT

## 2024-04-17 PROCEDURE — 97162 PT EVAL MOD COMPLEX 30 MIN: CPT

## 2024-04-17 RX ORDER — ENOXAPARIN SODIUM 100 MG/ML
30 INJECTION SUBCUTANEOUS EVERY EVENING
Status: DISCONTINUED | OUTPATIENT
Start: 2024-04-17 | End: 2024-04-21 | Stop reason: HOSPADM

## 2024-04-17 RX ADMIN — MONTELUKAST 10 MG: 10 TABLET, FILM COATED ORAL at 21:21

## 2024-04-17 RX ADMIN — TRAZODONE HYDROCHLORIDE 50 MG: 50 TABLET ORAL at 21:21

## 2024-04-17 RX ADMIN — SODIUM CHLORIDE, PRESERVATIVE FREE 10 ML: 5 INJECTION INTRAVENOUS at 09:20

## 2024-04-17 RX ADMIN — IPRATROPIUM BROMIDE AND ALBUTEROL SULFATE 1 DOSE: 2.5; .5 SOLUTION RESPIRATORY (INHALATION) at 07:54

## 2024-04-17 RX ADMIN — PANTOPRAZOLE SODIUM 40 MG: 40 TABLET, DELAYED RELEASE ORAL at 05:40

## 2024-04-17 RX ADMIN — IPRATROPIUM BROMIDE AND ALBUTEROL SULFATE 1 DOSE: 2.5; .5 SOLUTION RESPIRATORY (INHALATION) at 23:24

## 2024-04-17 RX ADMIN — ENOXAPARIN SODIUM 30 MG: 100 INJECTION SUBCUTANEOUS at 18:00

## 2024-04-17 RX ADMIN — IPRATROPIUM BROMIDE AND ALBUTEROL SULFATE 1 DOSE: 2.5; .5 SOLUTION RESPIRATORY (INHALATION) at 19:48

## 2024-04-17 RX ADMIN — WATER 1000 MG: 1 INJECTION INTRAMUSCULAR; INTRAVENOUS; SUBCUTANEOUS at 14:55

## 2024-04-17 RX ADMIN — AZITHROMYCIN MONOHYDRATE 500 MG: 500 INJECTION, POWDER, LYOPHILIZED, FOR SOLUTION INTRAVENOUS at 15:08

## 2024-04-17 RX ADMIN — SODIUM CHLORIDE, PRESERVATIVE FREE 10 ML: 5 INJECTION INTRAVENOUS at 21:21

## 2024-04-17 RX ADMIN — OXYBUTYNIN CHLORIDE 10 MG: 10 TABLET, EXTENDED RELEASE ORAL at 09:18

## 2024-04-17 RX ADMIN — IPRATROPIUM BROMIDE AND ALBUTEROL SULFATE 1 DOSE: 2.5; .5 SOLUTION RESPIRATORY (INHALATION) at 03:32

## 2024-04-17 RX ADMIN — IPRATROPIUM BROMIDE AND ALBUTEROL SULFATE 1 DOSE: 2.5; .5 SOLUTION RESPIRATORY (INHALATION) at 15:24

## 2024-04-17 RX ADMIN — BUDESONIDE AND FORMOTEROL FUMARATE DIHYDRATE 1 PUFF: 80; 4.5 AEROSOL RESPIRATORY (INHALATION) at 19:49

## 2024-04-17 RX ADMIN — Medication 10 MG: at 21:21

## 2024-04-17 NOTE — CONSULTS
[facility] ACUTE CARE PHYSICAL THERAPY EVALUATION  Shannon Amaya, 1945, 2022/2022-A, 4/17/2024    History  [place]:  There were no encounter diagnoses.  Patient  has a past medical history of Former smoker, Hypertension, Moderate COPD (chronic obstructive pulmonary disease) (HCC), and Other emphysema (HCC).  Patient  has a past surgical history that includes Hysterectomy; Cholecystectomy; Abdomen surgery; other surgical history (07/16/2018); and Breast surgery.    Discharge Recommendation: Encourage facility for moderate post-acute rehabilitation, anticipate 1-2 hours per day and 5 days per week.     Equipment: TBD at next level of care    Subjective:    Patient states:  \"I just want to be done for today.\"      Pain:  denies pain.      Communication with other providers:  Handoff to RN, OT    Restrictions: general precautions, fall risk    Home Setup/Prior level of function  Social/Functional History  Lives With: Daughter  Type of Home: House  Home Layout: One level  Home Access: Level entry  Bathroom Shower/Tub: Walk-in shower  Bathroom Equipment: Grab bars in shower, Shower chair  Home Equipment: Rollator, Oxygen (only uses rollator inside home, 3L at night)  Has the patient had two or more falls in the past year or any fall with injury in the past year?: Yes (4 falls in last week d/t unknow causes)  Receives Help From: Family  ADL Assistance: Independent  Homemaking Assistance: Needs assistance (DTR preforms)  Ambulation Assistance: Independent (using 4ww in home)  Transfer Assistance: Independent  Active : No    Examination of body systems (includes body structures/functions, activity/participation limitations):  Observation:  pt supine in bed with family present upon arrival and agreeable to therapy  Vision:  WFL  Hearing:  WFL  Cardiopulmonary:  3L O2, only wears at night at baseline  Cognition: appears slightly impaired, see OT/SLP note for further evaluation.    Musculoskeletal  ROM

## 2024-04-17 NOTE — H&P
History and Physical      Name:  Shannon Amaya /Age/Sex: 1945  (79 y.o. female)   MRN & CSN:  [id number] & [id number] Encounter Date/Time: 2024 9:45 PM   Location:  -A PCP: Paddy Perez MD       Hospital Day: 1    Assessment and Plan:     Patient is a 79-year-old female who presented with SOB. Transferred from [place] ED.      # Community-acquired pneumonia of left lower lung  # Acute on chronic hypoxemic respiratory failure   # Moderate COPD  - Endorsed worsening SOB with productive cough for few days days. On 3L NC nightly.   - Requiring BPAP in ED, VBG 7./80. Afebrile, leukocytosis of 12.8, LA normal, PCT normal. CXR suspicious for infiltrates in LLL. Flu and COVID negative.  - Started on Rocephin/Azithro in the ED, continue.  - Follow-up cultures and inflammatory markers. Wean BPAP as tolerated. Continue home inhalers. Goal SpO2 88-92%.    # Repeated falls  - Endorsed generalized weakness and repeated falls at home.   - Exam non-focal. CTH non-acute.   - PT/OT, appreciate assistance. Fall precautions.    # GERD  - Continue PPI.    # Anxiety / depression  - Continue Zoloft.    Checklist:  Advanced care planning: full  Diet: NPO while on BPAP  DVT ppx: Lovenox    Disposition: admit to inpatient.  Estimated discharge: 2-3 day(s).  Current living situation: home.  Expected disposition: home.    Spoke with ED provider who recommended admission for the patient and I agree with that plan.  Personally reviewed lab studies and imaging.  EKG interpreted personally and results as stated above.  Imaging that was interpreted personally and results as stated above.    History of Present Illness:     Chief Complaint: Shortness of breath    Patient is a 79-year-old female with a PMHx of moderate COPD with chronic hypoxemic respiratory failure (on 3L NC qhs), GERD, underweight, anxiety and depression who presented to the ED with worsening SOB with productive cough for few days days.

## 2024-04-18 PROCEDURE — 2580000003 HC RX 258: Performed by: STUDENT IN AN ORGANIZED HEALTH CARE EDUCATION/TRAINING PROGRAM

## 2024-04-18 PROCEDURE — 2700000000 HC OXYGEN THERAPY PER DAY

## 2024-04-18 PROCEDURE — 6370000000 HC RX 637 (ALT 250 FOR IP): Performed by: STUDENT IN AN ORGANIZED HEALTH CARE EDUCATION/TRAINING PROGRAM

## 2024-04-18 PROCEDURE — 97535 SELF CARE MNGMENT TRAINING: CPT

## 2024-04-18 PROCEDURE — 51798 US URINE CAPACITY MEASURE: CPT

## 2024-04-18 PROCEDURE — 6360000002 HC RX W HCPCS: Performed by: STUDENT IN AN ORGANIZED HEALTH CARE EDUCATION/TRAINING PROGRAM

## 2024-04-18 PROCEDURE — 94761 N-INVAS EAR/PLS OXIMETRY MLT: CPT

## 2024-04-18 PROCEDURE — 2060000000 HC ICU INTERMEDIATE R&B

## 2024-04-18 PROCEDURE — 94640 AIRWAY INHALATION TREATMENT: CPT

## 2024-04-18 PROCEDURE — 97530 THERAPEUTIC ACTIVITIES: CPT

## 2024-04-18 RX ORDER — IPRATROPIUM BROMIDE AND ALBUTEROL SULFATE 2.5; .5 MG/3ML; MG/3ML
1 SOLUTION RESPIRATORY (INHALATION) EVERY 4 HOURS PRN
Status: DISCONTINUED | OUTPATIENT
Start: 2024-04-18 | End: 2024-04-21 | Stop reason: HOSPADM

## 2024-04-18 RX ORDER — IPRATROPIUM BROMIDE AND ALBUTEROL SULFATE 2.5; .5 MG/3ML; MG/3ML
1 SOLUTION RESPIRATORY (INHALATION)
Status: DISCONTINUED | OUTPATIENT
Start: 2024-04-18 | End: 2024-04-19

## 2024-04-18 RX ADMIN — WATER 1000 MG: 1 INJECTION INTRAMUSCULAR; INTRAVENOUS; SUBCUTANEOUS at 13:58

## 2024-04-18 RX ADMIN — PANTOPRAZOLE SODIUM 40 MG: 40 TABLET, DELAYED RELEASE ORAL at 05:30

## 2024-04-18 RX ADMIN — CLONAZEPAM 0.5 MG: 0.5 TABLET ORAL at 08:19

## 2024-04-18 RX ADMIN — Medication 10 MG: at 21:19

## 2024-04-18 RX ADMIN — BUDESONIDE AND FORMOTEROL FUMARATE DIHYDRATE 1 PUFF: 80; 4.5 AEROSOL RESPIRATORY (INHALATION) at 07:54

## 2024-04-18 RX ADMIN — SODIUM CHLORIDE, PRESERVATIVE FREE 10 ML: 5 INJECTION INTRAVENOUS at 21:19

## 2024-04-18 RX ADMIN — TRAZODONE HYDROCHLORIDE 50 MG: 50 TABLET ORAL at 21:19

## 2024-04-18 RX ADMIN — POLYETHYLENE GLYCOL 3350 17 G: 17 POWDER, FOR SOLUTION ORAL at 14:17

## 2024-04-18 RX ADMIN — SODIUM CHLORIDE, PRESERVATIVE FREE 10 ML: 5 INJECTION INTRAVENOUS at 08:19

## 2024-04-18 RX ADMIN — AZITHROMYCIN MONOHYDRATE 500 MG: 500 INJECTION, POWDER, LYOPHILIZED, FOR SOLUTION INTRAVENOUS at 14:11

## 2024-04-18 RX ADMIN — ENOXAPARIN SODIUM 30 MG: 100 INJECTION SUBCUTANEOUS at 17:51

## 2024-04-18 RX ADMIN — IPRATROPIUM BROMIDE AND ALBUTEROL SULFATE 1 DOSE: 2.5; .5 SOLUTION RESPIRATORY (INHALATION) at 07:54

## 2024-04-18 RX ADMIN — SERTRALINE HYDROCHLORIDE 100 MG: 50 TABLET ORAL at 08:19

## 2024-04-18 RX ADMIN — MONTELUKAST 10 MG: 10 TABLET, FILM COATED ORAL at 21:19

## 2024-04-18 NOTE — CARE COORDINATION
Spoke with pt in room. From home with dtr. Dtr works fulltime, but has cameras at home to monitor pt. Discussed PT/OT recs. Pt stated she did not want to go to SNF. Is agreeable to HC. Has no prev hx. CM brought HC list for pt to review. CM will follow up for HC list.     04/18/24 8169   Service Assessment   Patient Orientation Alert and Oriented   Cognition Alert   History Provided By Patient;Medical Record   Primary Caregiver Self   Support Systems Children   Patient's Healthcare Decision Maker is: Legal Next of Kin   PCP Verified by CM Yes   Prior Functional Level Independent in ADLs/IADLs   Current Functional Level Assistance with the following:   Can patient return to prior living arrangement Yes   Ability to make needs known: Good   Family able to assist with home care needs: Yes   Would you like for me to discuss the discharge plan with any other family members/significant others, and if so, who? No   Financial Resources Medicare   Community Resources None   Social/Functional History   Lives With Daughter   Type of Home House   Home Layout One level   Home Access Level entry   Bathroom Shower/Tub Walk-in shower   Bathroom Equipment Grab bars in shower;Shower chair   Home Equipment Rollator;Oxygen  (only uses rollator inside home, 3L at night)   Receives Help From Family   ADL Assistance Independent   Homemaking Assistance Needs assistance  (DTR preforms)   Ambulation Assistance Independent  (using 4ww in home)   Transfer Assistance Independent   Active  No

## 2024-04-18 NOTE — RT PROTOCOL NOTE
or increased work of breathing using Per Protocol order mode.        4-6 - enter or revise RT Bronchodilator order(s) to two equivalent RT bronchodilator orders with one order with BID Frequency and one order with Frequency of every 4 hours PRN wheezing or increased work of breathing using Per Protocol order mode.        7-10 - enter or revise RT Bronchodilator order(s) to two equivalent RT bronchodilator orders with one order with TID Frequency and one order with Frequency of every 4 hours PRN wheezing or increased work of breathing using Per Protocol order mode.       11-13 - enter or revise RT Bronchodilator order(s) to one equivalent RT bronchodilator order with QID Frequency and an Albuterol order with Frequency of every 4 hours PRN wheezing or increased work of breathing using Per Protocol order mode.      Greater than 13 - enter or revise RT Bronchodilator order(s) to one equivalent RT bronchodilator order with every 4 hours Frequency and an Albuterol order with Frequency of every 2 hours PRN wheezing or increased work of breathing using Per Protocol order mode.     RT to enter RT Home Evaluation for COPD & MDI Assessment order using Per Protocol order mode.    Electronically signed by Lucy Jensen RCP on 4/18/2024 at 11:02 AM

## 2024-04-19 LAB
ANION GAP SERPL CALCULATED.3IONS-SCNC: 7 MMOL/L (ref 7–16)
BUN SERPL-MCNC: 12 MG/DL (ref 6–23)
CALCIUM SERPL-MCNC: 8.3 MG/DL (ref 8.3–10.6)
CHLORIDE BLD-SCNC: 103 MMOL/L (ref 99–110)
CO2: 38 MMOL/L (ref 21–32)
CREAT SERPL-MCNC: 0.4 MG/DL (ref 0.6–1.1)
GFR SERPL CREATININE-BSD FRML MDRD: >90 ML/MIN/1.73M2
GLUCOSE SERPL-MCNC: 87 MG/DL (ref 70–99)
HCT VFR BLD CALC: 41.8 % (ref 37–47)
HEMOGLOBIN: 12.1 GM/DL (ref 12.5–16)
MCH RBC QN AUTO: 27.9 PG (ref 27–31)
MCHC RBC AUTO-ENTMCNC: 28.9 % (ref 32–36)
MCV RBC AUTO: 96.5 FL (ref 78–100)
PDW BLD-RTO: 16 % (ref 11.7–14.9)
PLATELET # BLD: 182 K/CU MM (ref 140–440)
PMV BLD AUTO: 10.2 FL (ref 7.5–11.1)
POTASSIUM SERPL-SCNC: 4 MMOL/L (ref 3.5–5.1)
RBC # BLD: 4.33 M/CU MM (ref 4.2–5.4)
SODIUM BLD-SCNC: 148 MMOL/L (ref 135–145)
WBC # BLD: 5.4 K/CU MM (ref 4–10.5)

## 2024-04-19 PROCEDURE — 94660 CPAP INITIATION&MGMT: CPT

## 2024-04-19 PROCEDURE — 6370000000 HC RX 637 (ALT 250 FOR IP): Performed by: STUDENT IN AN ORGANIZED HEALTH CARE EDUCATION/TRAINING PROGRAM

## 2024-04-19 PROCEDURE — 51798 US URINE CAPACITY MEASURE: CPT

## 2024-04-19 PROCEDURE — 85027 COMPLETE CBC AUTOMATED: CPT

## 2024-04-19 PROCEDURE — 97530 THERAPEUTIC ACTIVITIES: CPT

## 2024-04-19 PROCEDURE — 94640 AIRWAY INHALATION TREATMENT: CPT

## 2024-04-19 PROCEDURE — 2700000000 HC OXYGEN THERAPY PER DAY

## 2024-04-19 PROCEDURE — 2060000000 HC ICU INTERMEDIATE R&B

## 2024-04-19 PROCEDURE — 6360000002 HC RX W HCPCS: Performed by: STUDENT IN AN ORGANIZED HEALTH CARE EDUCATION/TRAINING PROGRAM

## 2024-04-19 PROCEDURE — 80048 BASIC METABOLIC PNL TOTAL CA: CPT

## 2024-04-19 PROCEDURE — 97116 GAIT TRAINING THERAPY: CPT

## 2024-04-19 PROCEDURE — 2580000003 HC RX 258: Performed by: STUDENT IN AN ORGANIZED HEALTH CARE EDUCATION/TRAINING PROGRAM

## 2024-04-19 PROCEDURE — 94761 N-INVAS EAR/PLS OXIMETRY MLT: CPT

## 2024-04-19 PROCEDURE — 36415 COLL VENOUS BLD VENIPUNCTURE: CPT

## 2024-04-19 RX ORDER — IPRATROPIUM BROMIDE AND ALBUTEROL SULFATE 2.5; .5 MG/3ML; MG/3ML
1 SOLUTION RESPIRATORY (INHALATION)
Status: DISCONTINUED | OUTPATIENT
Start: 2024-04-19 | End: 2024-04-21 | Stop reason: HOSPADM

## 2024-04-19 RX ORDER — PREDNISONE 10 MG/1
40 TABLET ORAL DAILY
Status: DISCONTINUED | OUTPATIENT
Start: 2024-04-19 | End: 2024-04-21 | Stop reason: HOSPADM

## 2024-04-19 RX ADMIN — SERTRALINE HYDROCHLORIDE 100 MG: 50 TABLET ORAL at 08:11

## 2024-04-19 RX ADMIN — MONTELUKAST 10 MG: 10 TABLET, FILM COATED ORAL at 21:16

## 2024-04-19 RX ADMIN — BUDESONIDE AND FORMOTEROL FUMARATE DIHYDRATE 1 PUFF: 80; 4.5 AEROSOL RESPIRATORY (INHALATION) at 08:38

## 2024-04-19 RX ADMIN — IPRATROPIUM BROMIDE AND ALBUTEROL SULFATE 1 DOSE: 2.5; .5 SOLUTION RESPIRATORY (INHALATION) at 08:37

## 2024-04-19 RX ADMIN — ENOXAPARIN SODIUM 30 MG: 100 INJECTION SUBCUTANEOUS at 17:05

## 2024-04-19 RX ADMIN — BUDESONIDE AND FORMOTEROL FUMARATE DIHYDRATE 1 PUFF: 80; 4.5 AEROSOL RESPIRATORY (INHALATION) at 20:22

## 2024-04-19 RX ADMIN — IPRATROPIUM BROMIDE AND ALBUTEROL SULFATE 1 DOSE: 2.5; .5 SOLUTION RESPIRATORY (INHALATION) at 20:25

## 2024-04-19 RX ADMIN — Medication 10 MG: at 21:16

## 2024-04-19 RX ADMIN — TRAZODONE HYDROCHLORIDE 50 MG: 50 TABLET ORAL at 21:16

## 2024-04-19 RX ADMIN — PANTOPRAZOLE SODIUM 40 MG: 40 TABLET, DELAYED RELEASE ORAL at 05:02

## 2024-04-19 RX ADMIN — WATER 1000 MG: 1 INJECTION INTRAMUSCULAR; INTRAVENOUS; SUBCUTANEOUS at 14:11

## 2024-04-19 RX ADMIN — SODIUM CHLORIDE, PRESERVATIVE FREE 10 ML: 5 INJECTION INTRAVENOUS at 08:11

## 2024-04-19 RX ADMIN — POLYETHYLENE GLYCOL 3350 17 G: 17 POWDER, FOR SOLUTION ORAL at 08:11

## 2024-04-19 RX ADMIN — PREDNISONE 40 MG: 10 TABLET ORAL at 14:10

## 2024-04-19 RX ADMIN — SODIUM CHLORIDE, PRESERVATIVE FREE 10 ML: 5 INJECTION INTRAVENOUS at 21:16

## 2024-04-19 RX ADMIN — CLONAZEPAM 0.5 MG: 0.5 TABLET ORAL at 08:11

## 2024-04-19 NOTE — CARE COORDINATION
CM in to see pt regarding HHC. Cindy had given pt a list. Pt told this CM that she had not decided on which company as she wanted to talk with her daughter about it. CM will place on weekend follow up list for pts HHC choice.

## 2024-04-20 LAB
CULTURE: NORMAL
GRAM SMEAR: NORMAL
Lab: NORMAL
SPECIMEN: NORMAL

## 2024-04-20 PROCEDURE — 94660 CPAP INITIATION&MGMT: CPT

## 2024-04-20 PROCEDURE — 2700000000 HC OXYGEN THERAPY PER DAY

## 2024-04-20 PROCEDURE — 94761 N-INVAS EAR/PLS OXIMETRY MLT: CPT

## 2024-04-20 PROCEDURE — 2060000000 HC ICU INTERMEDIATE R&B

## 2024-04-20 PROCEDURE — 6370000000 HC RX 637 (ALT 250 FOR IP): Performed by: STUDENT IN AN ORGANIZED HEALTH CARE EDUCATION/TRAINING PROGRAM

## 2024-04-20 PROCEDURE — 97530 THERAPEUTIC ACTIVITIES: CPT

## 2024-04-20 PROCEDURE — 94640 AIRWAY INHALATION TREATMENT: CPT

## 2024-04-20 PROCEDURE — 2580000003 HC RX 258: Performed by: STUDENT IN AN ORGANIZED HEALTH CARE EDUCATION/TRAINING PROGRAM

## 2024-04-20 PROCEDURE — 97535 SELF CARE MNGMENT TRAINING: CPT

## 2024-04-20 PROCEDURE — 6360000002 HC RX W HCPCS: Performed by: STUDENT IN AN ORGANIZED HEALTH CARE EDUCATION/TRAINING PROGRAM

## 2024-04-20 RX ORDER — CLONAZEPAM 0.5 MG/1
0.5 TABLET ORAL 2 TIMES DAILY
Status: DISCONTINUED | OUTPATIENT
Start: 2024-04-20 | End: 2024-04-21

## 2024-04-20 RX ORDER — CLONAZEPAM 0.5 MG/1
0.5 TABLET ORAL ONCE
Status: COMPLETED | OUTPATIENT
Start: 2024-04-20 | End: 2024-04-20

## 2024-04-20 RX ADMIN — IPRATROPIUM BROMIDE AND ALBUTEROL SULFATE 1 DOSE: 2.5; .5 SOLUTION RESPIRATORY (INHALATION) at 08:42

## 2024-04-20 RX ADMIN — IPRATROPIUM BROMIDE AND ALBUTEROL SULFATE 1 DOSE: 2.5; .5 SOLUTION RESPIRATORY (INHALATION) at 16:25

## 2024-04-20 RX ADMIN — SODIUM CHLORIDE, PRESERVATIVE FREE 10 ML: 5 INJECTION INTRAVENOUS at 21:47

## 2024-04-20 RX ADMIN — MONTELUKAST 10 MG: 10 TABLET, FILM COATED ORAL at 21:47

## 2024-04-20 RX ADMIN — SODIUM CHLORIDE, PRESERVATIVE FREE 10 ML: 5 INJECTION INTRAVENOUS at 08:29

## 2024-04-20 RX ADMIN — IPRATROPIUM BROMIDE AND ALBUTEROL SULFATE 1 DOSE: 2.5; .5 SOLUTION RESPIRATORY (INHALATION) at 21:19

## 2024-04-20 RX ADMIN — OXYBUTYNIN CHLORIDE 10 MG: 10 TABLET, EXTENDED RELEASE ORAL at 08:29

## 2024-04-20 RX ADMIN — CLONAZEPAM 0.5 MG: 0.5 TABLET ORAL at 15:32

## 2024-04-20 RX ADMIN — WATER 1000 MG: 1 INJECTION INTRAMUSCULAR; INTRAVENOUS; SUBCUTANEOUS at 14:47

## 2024-04-20 RX ADMIN — BUDESONIDE AND FORMOTEROL FUMARATE DIHYDRATE 1 PUFF: 80; 4.5 AEROSOL RESPIRATORY (INHALATION) at 08:44

## 2024-04-20 RX ADMIN — Medication 10 MG: at 21:47

## 2024-04-20 RX ADMIN — PREDNISONE 40 MG: 10 TABLET ORAL at 08:28

## 2024-04-20 RX ADMIN — IPRATROPIUM BROMIDE AND ALBUTEROL SULFATE 1 DOSE: 2.5; .5 SOLUTION RESPIRATORY (INHALATION) at 12:08

## 2024-04-20 RX ADMIN — CLONAZEPAM 0.5 MG: 0.5 TABLET ORAL at 22:00

## 2024-04-20 RX ADMIN — BUDESONIDE AND FORMOTEROL FUMARATE DIHYDRATE 1 PUFF: 80; 4.5 AEROSOL RESPIRATORY (INHALATION) at 21:20

## 2024-04-20 RX ADMIN — ENOXAPARIN SODIUM 30 MG: 100 INJECTION SUBCUTANEOUS at 18:48

## 2024-04-20 RX ADMIN — PANTOPRAZOLE SODIUM 40 MG: 40 TABLET, DELAYED RELEASE ORAL at 05:32

## 2024-04-20 RX ADMIN — TRAZODONE HYDROCHLORIDE 50 MG: 50 TABLET ORAL at 21:47

## 2024-04-20 NOTE — CARE COORDINATION
LSW met with pt for follow up dc planning.  Pt lying in bed, states she is ready for dc.  Pt confirmed dc plan is home with support from dtr and HC services.  LSW re-reviewed HC services and answered pt's questions.  Pt denied HC preference.  LSW advised referral would be made to [facility] with [facility].  Pt voiced verb understanding and denied questions.    Will need inpt consult to HC at dc.  Please call HC to notify of referral/dc and fax facesheet, AVS, and HC order to number below.  HC ph [phone], fx [phone]  Electronically signed by TREV Root on 4/20/2024 at 8:59 AM

## 2024-04-21 VITALS
WEIGHT: 89.07 LBS | BODY MASS INDEX: 15.78 KG/M2 | HEIGHT: 63 IN | DIASTOLIC BLOOD PRESSURE: 89 MMHG | HEART RATE: 98 BPM | TEMPERATURE: 97.3 F | RESPIRATION RATE: 22 BRPM | OXYGEN SATURATION: 93 % | SYSTOLIC BLOOD PRESSURE: 155 MMHG

## 2024-04-21 PROCEDURE — 94640 AIRWAY INHALATION TREATMENT: CPT

## 2024-04-21 PROCEDURE — 6370000000 HC RX 637 (ALT 250 FOR IP): Performed by: STUDENT IN AN ORGANIZED HEALTH CARE EDUCATION/TRAINING PROGRAM

## 2024-04-21 PROCEDURE — 94660 CPAP INITIATION&MGMT: CPT

## 2024-04-21 PROCEDURE — 94761 N-INVAS EAR/PLS OXIMETRY MLT: CPT

## 2024-04-21 PROCEDURE — 94618 PULMONARY STRESS TESTING: CPT

## 2024-04-21 PROCEDURE — 2700000000 HC OXYGEN THERAPY PER DAY

## 2024-04-21 PROCEDURE — 2580000003 HC RX 258: Performed by: STUDENT IN AN ORGANIZED HEALTH CARE EDUCATION/TRAINING PROGRAM

## 2024-04-21 PROCEDURE — 97116 GAIT TRAINING THERAPY: CPT

## 2024-04-21 PROCEDURE — 97530 THERAPEUTIC ACTIVITIES: CPT

## 2024-04-21 RX ORDER — BUDESONIDE AND FORMOTEROL FUMARATE DIHYDRATE 160; 4.5 UG/1; UG/1
2 AEROSOL RESPIRATORY (INHALATION) 2 TIMES DAILY
Qty: 10.2 G | Refills: 0 | Status: SHIPPED | OUTPATIENT
Start: 2024-04-21

## 2024-04-21 RX ORDER — PREDNISONE 20 MG/1
40 TABLET ORAL DAILY
Qty: 4 TABLET | Refills: 0 | Status: SHIPPED | OUTPATIENT
Start: 2024-04-22 | End: 2024-04-24

## 2024-04-21 RX ORDER — CLONAZEPAM 0.5 MG/1
0.5 TABLET ORAL EVERY 12 HOURS PRN
Status: DISCONTINUED | OUTPATIENT
Start: 2024-04-21 | End: 2024-04-21 | Stop reason: HOSPADM

## 2024-04-21 RX ADMIN — PREDNISONE 40 MG: 10 TABLET ORAL at 09:10

## 2024-04-21 RX ADMIN — OXYBUTYNIN CHLORIDE 10 MG: 10 TABLET, EXTENDED RELEASE ORAL at 09:10

## 2024-04-21 RX ADMIN — IPRATROPIUM BROMIDE AND ALBUTEROL SULFATE 1 DOSE: 2.5; .5 SOLUTION RESPIRATORY (INHALATION) at 12:20

## 2024-04-21 RX ADMIN — CLONAZEPAM 0.5 MG: 0.5 TABLET ORAL at 09:10

## 2024-04-21 RX ADMIN — IPRATROPIUM BROMIDE AND ALBUTEROL SULFATE 1 DOSE: 2.5; .5 SOLUTION RESPIRATORY (INHALATION) at 16:09

## 2024-04-21 RX ADMIN — SODIUM CHLORIDE, PRESERVATIVE FREE 10 ML: 5 INJECTION INTRAVENOUS at 09:10

## 2024-04-21 RX ADMIN — IPRATROPIUM BROMIDE AND ALBUTEROL SULFATE 1 DOSE: 2.5; .5 SOLUTION RESPIRATORY (INHALATION) at 08:56

## 2024-04-21 RX ADMIN — BUDESONIDE AND FORMOTEROL FUMARATE DIHYDRATE 1 PUFF: 80; 4.5 AEROSOL RESPIRATORY (INHALATION) at 08:56

## 2024-04-21 RX ADMIN — PANTOPRAZOLE SODIUM 40 MG: 40 TABLET, DELAYED RELEASE ORAL at 05:53

## 2024-04-21 NOTE — PROGRESS NOTES
4/21/2024 10:46 AM  Patient Room #: 2022/2022-A  Patient Name: Shannon Amaya    (Step 1 Done by RN if possible otherwise call Pulmonary Diagnostics)  Place patient on room air at rest for at least 30 minutes.  If patient falls below 88% before 30 minutes then you can record the level and stop.  Record room air saturation level _87_ %.  If patient is at 88% or below, they will qualify for home oxygen and you can stop.  If level does not fall below 88%, fill in level above. If indicated continue to Step 2.   Signature:Jerica MENG RRT Date:04/21/2024 ___  (Step 2&3 Done by [facility])  Ambulate patient on room air until saturation falls below 89%.  Record level of room air saturation with ambulation___ %.  Next, place patient back on ___lpm oxygen and ambulate, record level __%.  (Note:  this level must show improvement from room air level done with ambulation.)  If patient’s saturation on room air with ambulation is 88% or below AND patient shows improvement with oxygen during ambulation, they will qualify for home oxygen and you can stop.  If patient does not drop below 89%, then patient should have an overnight oximetry trending on room air to see if level falls below 88%.  Complete level in Step 3 below.    Room air overnight oximetry level 88 % for___  cumulative minutes.  If patient’s room air oxygen level is below <89% for any amount of time they will qualify for nocturnal home oxygen.        (Attach Night Trending Report)    Complete order below: Diagnosis:COPD  Home oxygen at:  Length of Need: X Lifetime ? 3 Months     _3__lpm or __%   via  [x] nasal cannula  []mask  [] other         [x]continuous [x]  with activity  [x]  Nocturnal   [x] Portable Tanks [x]  Concentrator  [x] Conserving Device        Therapist Signature:Nya Fleming, RRT   Date:04/21/2024  ___  Physician Signature:  __Electronically Signed in EMR_    Date:___  Physician Printed Name:Ordering User: Maurice Callahan 
    V2.0  [facility] [place] [facility] Hospitalist Progress Note      Name:  Shannon Amaya /Age/Sex: 1945  (79 y.o. female)   MRN & CSN:  [id number] & [id number] Encounter Date/Time: 2024 12:36 PM EDT    Location:  -A PCP: Paddy Perez MD       Hospital Day: 5    Assessment and Plan:   Shannon Amaya is a 79 y.o. female with pmh of  moderate COPD with chronic hypoxemic respiratory failure (on 3L NC qhs), GERD, underweight, anxiety and depression  who presents with Community acquired pneumonia, unspecified laterality      Plan:    Acute on chronic hypoxic respiratory failure  COPD exacerbation  Left lower lobe pneumonia   Likely secondary to pneumonia of left lower lung along with COPD exacerbation, on 3-4 L O2 via NC wean as tolerated continue Rocephin and azithromycin follow-up sputum cultures blood culture NGTD.    Continue to use BiPAP as needed.   Supplemental oxygen to keep sats 88 to 92%  I will add on a 5-day steroid burst  DuoNebs every 4    Multiple falls: Likely secondary to physical deconditioning, CT head nonacute PT OT on board possible need of inpatient rehab  Urinary retention: straight cath x 2, if not able to urinate will place weaver cath  GERD: Continue PPI  Anxiety disorder: On Zoloft at home    Diet ADULT DIET; Regular  ADULT ORAL NUTRITION SUPPLEMENT; Breakfast, Lunch, Dinner; Standard High Calorie/High Protein Oral Supplement   DVT Prophylaxis [x] Lovenox, []  Heparin, [] SCDs, [] Ambulation,  [] Eliquis, [] Xarelto  [] Coumadin   Code Status Full Code   Disposition From: Home  Expected Disposition: Home with home care  Estimated Date of Discharge: 1-2 days  Patient requires continued admission due to CAP, respiratory failure   Surrogate Decision Maker/ POA Daughter     Subjective:     Chief Complaint: No chief complaint on file.     Patient breathing. Here air movement is improving with steroids and breathing treatments added yesterday.      Review of Systems:    Review of 
    V2.0  [facility] [place] Hospitalist Progress Note      Name:  Shannon Amaya /Age/Sex: 1945  (79 y.o. female)   MRN & CSN:  [id number] & [id number] Encounter Date/Time: 2024 12:36 PM EDT    Location:  -A PCP: Paddy Perez MD       Hospital Day: 2    Assessment and Plan:   Shannon Amaya is a 79 y.o. female with pmh of  moderate COPD with chronic hypoxemic respiratory failure (on 3L NC qhs), GERD, underweight, anxiety and depression  who presents with Community acquired pneumonia, unspecified laterality      Plan:  Acute on chronic hypoxic respiratory failure: Likely secondary to pneumonia of left lower lung along with COPD exacerbation, was on BiPAP this morning, wean to O2 via nasal cannula as tolerated continue Rocephin and azithromycin follow-up cultures  Multiple falls: Likely secondary to physical deconditioning, CT head nonacute PT OT on board possible need of 6 inpatient rehab  GERD: Continue PPI  Anxiety disorder: On Zoloft at home    Diet ADULT DIET; Regular   DVT Prophylaxis [x] Lovenox, []  Heparin, [] SCDs, [] Ambulation,  [] Eliquis, [] Xarelto  [] Coumadin   Code Status Full Code   Disposition From: Home  Expected Disposition: SNF  Estimated Date of Discharge: 2 days  Patient requires continued admission due to CAP, respiratory failure   Surrogate Decision Maker/ POA Daughter     Subjective:     Chief Complaint: No chief complaint on file.       Shannon Amaya is a 79 y.o. female who presents with shortness of breath and falls seen and examined at bedside on BiPAP this morning alert and oriented states that she is hungry and would like to eat and drink something discussed regarding the blood gas findings and need of BiPAP for the time being daughter at bedside as well all questions have been answered.         Review of Systems:    Review of Systems  As above    Objective:     Intake/Output Summary (Last 24 hours) at 2024 1236  Last data filed at 2024 1032  Gross 
    V2.0  [facility] [place] Hospitalist Progress Note      Name:  Shannon Amaya /Age/Sex: 1945  (79 y.o. female)   MRN & CSN:  [id number] & [id number] Encounter Date/Time: 2024 12:36 PM EDT    Location:  -A PCP: Paddy Perez MD       Hospital Day: 3    Assessment and Plan:   Shannon Amaya is a 79 y.o. female with pmh of  moderate COPD with chronic hypoxemic respiratory failure (on 3L NC qhs), GERD, underweight, anxiety and depression  who presents with Community acquired pneumonia, unspecified laterality      Plan:  Acute on chronic hypoxic respiratory failure: Likely secondary to pneumonia of left lower lung along with COPD exacerbation, on 3-4 L O2 via NC wean as tolerated continue Rocephin and azithromycin follow-up sputum cultures blood culture NGTD.  Multiple falls: Likely secondary to physical deconditioning, CT head nonacute PT OT on board possible need of inpatient rehab  Urinary retention: straight cath x 2, if not able to urinate will place weaver cath  GERD: Continue PPI  Anxiety disorder: On Zoloft at home    Diet ADULT DIET; Regular  ADULT ORAL NUTRITION SUPPLEMENT; Breakfast, Lunch, Dinner; Standard High Calorie/High Protein Oral Supplement   DVT Prophylaxis [x] Lovenox, []  Heparin, [] SCDs, [] Ambulation,  [] Eliquis, [] Xarelto  [] Coumadin   Code Status Full Code   Disposition From: Home  Expected Disposition: SNF  Estimated Date of Discharge: 2 days  Patient requires continued admission due to CAP, respiratory failure   Surrogate Decision Maker/ POA Daughter     Subjective:     Chief Complaint: No chief complaint on file.       Shannon Amaya is a 79 y.o. female who presents with shortness of breath and falls seen and examined at bedside doing well coughing and sputum production brownish. Denies any fever or chills.      Review of Systems:    Review of Systems  As above    Objective:     Intake/Output Summary (Last 24 hours) at 2024 1133  Last data filed at 
    V2.0  [facility] [place]      Name:  Shannon Amaya /Age/Sex: 1945  (79 y.o. female)   MRN & CSN:  [id number] & [id number] Encounter Date/Time: 2024 12:36 PM EDT    Location:  -A PCP: Paddy Perez MD       Hospital Day: 4    Assessment and Plan:   Shannon Amaya is a 79 y.o. female with pmh of  moderate COPD with chronic hypoxemic respiratory failure (on 3L NC qhs), GERD, underweight, anxiety and depression  who presents with Community acquired pneumonia, unspecified laterality      Plan:    Acute on chronic hypoxic respiratory failure  COPD exacerbation  Left lower lobe pneumonia   Likely secondary to pneumonia of left lower lung along with COPD exacerbation, on 3-4 L O2 via NC wean as tolerated continue Rocephin and azithromycin follow-up sputum cultures blood culture NGTD.    Continue to use BiPAP as needed.   Supplemental oxygen to keep sats 88 to 92%  I will add on a 5-day steroid burst  DuoNebs every 4    Multiple falls: Likely secondary to physical deconditioning, CT head nonacute PT OT on board possible need of inpatient rehab  Urinary retention: straight cath x 2, if not able to urinate will place weaver cath  GERD: Continue PPI  Anxiety disorder: On Zoloft at home    Diet ADULT DIET; Regular  ADULT ORAL NUTRITION SUPPLEMENT; Breakfast, Lunch, Dinner; Standard High Calorie/High Protein Oral Supplement   DVT Prophylaxis [x] Lovenox, []  Heparin, [] SCDs, [] Ambulation,  [] Eliquis, [] Xarelto  [] Coumadin   Code Status Full Code   Disposition From: Home  Expected Disposition: SNF versus home  Estimated Date of Discharge: 1-2 days  Patient requires continued admission due to CAP, respiratory failure   Surrogate Decision Maker/ POA Daughter     Subjective:     Chief Complaint: No chief complaint on file.       Patient did have desaturation and required BiPAP this morning.  I will modify her breathing treatments and add on steroids.    Review of Systems:  
   04/19/24 1105   Encounter Summary   Encounter Overview/Reason  Initial Encounter   Service Provided For: Patient   Referral/Consult From: [facility]   Support System Children   Last Encounter  04/19/24   Complexity of Encounter Low   Begin Time 1100   End Time  1106   Total Time Calculated 6 min   Spiritual/Emotional needs   Type Spiritual Support   Assessment/Intervention/Outcome   Assessment Calm   Intervention Active listening;Nurtured Hope;Prayer (assurance of)/[place];Sustaining Presence/Ministry of presence   Outcome Comfort;Engaged in conversation   Plan and Referrals   Plan/Referrals Continue Support (comment)  (Support as needed)       
  Occupational Therapy Treatment Note    Name: Shannon Amaya MRN: [id number] :   1945   Date:  2024   Admission Date: 2024 Room:  -A     Primary Problem:  Community acquired pneumonia, unspecified laterality    Restrictions/Precautions:  General, fall risk    Communication with other providers: Per chart review and Nurse patient is appropriate for therapeutic intervention.     Subjective:  Patient states:  Agreeable to OT therapy  Pain:   Location, Type, Intensity (0/10 to 10/10):  deny    Objective:    Observation: In supine position upon arrival   Objective Measures:  Alert and oriented    Treatment, including education:  Therapeutic Activity Training:   Therapeutic activity training was instructed today.  Cues were given for safety, sequence, UE/LE placement, awareness, and balance.    Bed mobility:SBA utilizing RW  Sitting balance:Good on EOB  Sit/stand transfers:CGA utilizing RW with min verbal cues for hand placement; inconsistent squaring up and staying within the RW  Functional Mobility: CGA around the room<>bathroom, with LOB noted posterior and Min A self correct on turn prior to sitting with narrow ELINOR    Activities performed today included bed mobility training, transfers, functional mobility  to increase strength, activity tolerance to facilitate IND c ADL tasks, func transfers / mobility with G safety awareness carryover    Self Care Training:   Cues were given for safety, sequence, UE/LE placement, visual cues, and balance.    Activities performed today included UB bathing/hair washing, personal hygiene, and grooming task. Demo SBA for ADLs while seated per request by pt. Pt reported feeling unsteady while standing during ADL task. Pt requires BUE support while standing for balance and F- standing tolerance.      Assessment / Impression:    Patient's tolerance of treatment: Fair+  Adverse Reaction: None  Significant change in status and impact: Improved from initial 
  Patient was seen in hospital for COPD .  I am prescribing oxygen because the diagnosis and testing requires the patient to have oxygen in the home.  Conditions will improve or be benefited by oxygen use.  The patient is able to perform good mobility and therefore requires the use of a portable oxygen system for ambulation.    
  Physical Therapy Treatment Note  Name: Shannon Amaya MRN: [id number] :   1945   Date:  2024   Admission Date: 2024 Room:  -A     Restrictions/Precautions:          general precautions, fall risk    Communication with other providers:  RN, tech    Subjective:  Patient states:  \"I'm feeling less wobbly\"  Pain:   Location, Type, Intensity (0/10 to 10/10):  denies pain    Objective:    Observation:  pt up in bathroom with tech, SpO2 70% without O2. RN notified, reapplied O2 to 25L, SpO2 recovered to 100%, O2 decreased to 10L and decreased again to 5L at end of session. Pt asymptomatic throughout.    Treatment, including education/measures:    Transfers: pt completed STS from commode SBA and to/from chair x2 trials SBA. Cues provided for sequencing    Gait: pt ambulated 50' with RW CGA with two LOB requiring min A to recover. Pt ambulated with decreased jack, narrow ELINOR, and decreased step length bilaterally. Cues provided for pathway.    Education: pt with questions about oxygen saturation, educated on general side effects of hypoxia. Pt also educated on use of RW upon discharge to decrease fall risk    Assessment / Impression:    Pt up in chair at end of session with needs in reach and alarm on    Patient's tolerance of treatment:  fair   Adverse Reaction: n/a  Significant change in status and impact:  improved balance compared to eval  Barriers to improvement:  decreased endurance, impaired gait, impaired balance    Plan for Next Session:    Continue to address transfer training and gait training in future sessions    Time in:  1247  Time out:  1314  Timed treatment minutes:  27  Total treatment time:  27    Previously filed items:  Social/Functional History  Lives With: Daughter  Type of Home: House  Home Layout: One level  Home Access: Level entry  Bathroom Shower/Tub: Walk-in shower  Bathroom Equipment: Grab bars in shower, Shower chair  Home Equipment: Rollator, Oxygen (only uses 
  Physician Progress Note      PATIENT:               LILLY MCCONNELL  CSN #:                  [id number]  :                       1945  ADMIT DATE:       2024 9:31 PM  DISCH DATE:  RESPONDING  PROVIDER #:        Stefany Driscoll MD          QUERY TEXT:    Patient admitted with pneumonia. Noted to have dietician assessment with   malnutrition diagnosis in  note. If possible, please document in progress   notes and discharge summary if you are evaluating and /or treating any of the   following:    The medical record reflects the following:  Risk Factors: COPD  Clinical Indicators: Dietitian note on  \"Severe malnutrition, In context   of chronic illness related to impaired respiratory function, increase demand   for energy/nutrients as evidenced by severe loss of subcutaneous fat, severe   muscle loss\"  Treatment: Oral Nutrition Supplement, dietitian consult.    ASPEN Criteria:    https://aspenjournals.onlinelibrary.cote.com/doi/full/10.1177/729534951273709  5    Thank you,  Ros GASPAR, RN, [facility] [phone]  Options provided:  -- Protein calorie malnutrition severe  -- Other - I will add my own diagnosis  -- Disagree - Not applicable / Not valid  -- Disagree - Clinically unable to determine / Unknown  -- Refer to Clinical Documentation Reviewer    PROVIDER RESPONSE TEXT:    This patient has severe protein calorie malnutrition.    Query created by: Ros Pineda on 2024 11:39 AM      Electronically signed by:  Stefany Driscoll MD 2024 12:29 PM          
4 Eyes Skin Assessment     NAME:  Shannon Amaya  YOB: 1945  MEDICAL RECORD NUMBER:  [id number]    The patient is being assessed for  Admission    I agree that at least one RN has performed a thorough Head to Toe Skin Assessment on the patient. ALL assessment sites listed below have been assessed.      Areas assessed by both nurses:    Head, Face, Ears, Shoulders, Back, Chest, Arms, Elbows, Hands, Sacrum. Buttock, Coccyx, Ischium, Legs. Feet and Heels, and Under Medical Devices         Does the Patient have a Wound? Yes wound(s) were present on assessment. LDA wound assessment was Initiated and completed by RN       Rufus Prevention initiated by RN: Yes  Wound Care Orders initiated by RN: No    Pressure Injury (Stage 3,4, Unstageable, DTI, NWPT, and Complex wounds) if present, place Wound referral order by RN under : No    New Ostomies, if present place, Ostomy referral order under : No     Nurse 1 eSignature: Electronically signed by Shan Hankins RN on 4/16/24 at 11:40 PM EDT    **SHARE this note so that the co-signing nurse can place an eSignature**    Nurse 2 eSignature: Electronically signed by Irineo Macdonald RN on 4/17/24 at 12:34 AM EDT   
At rest pt.'s 02 saturations dropped to 87%.  Placed back on 3L with 02 saturations improving to 93-94%.  Pt has [employer] for night time oxygen needs. Pt has a stationary concentrator and 02 tanks at home.  
Comprehensive Nutrition Assessment    Type and Reason for Visit:  Initial (low BMI)    Nutrition Recommendations/Plan:   Start oral diet as medically able; if unable to initiate oral diet within 48 hours of admit, please consider nutrition support, as patient is severely malnourished  Please order standard high calorie/high protein oral nutrition supplement TID once on diet  Monitor weights, po intakes, labs, skin, POC     Malnutrition Assessment:  Malnutrition Status:  Severe malnutrition (04/17/24 0831)    Context:  Chronic Illness     Findings of the 6 clinical characteristics of malnutrition:  Energy Intake:  Mild decrease in energy intake (Comment) (predicted)  Weight Loss:  No significant weight loss (varied wt)     Body Fat Loss:  Severe body fat loss Orbital, Fat Overlying Ribs, Triceps   Muscle Mass Loss:  Severe muscle mass loss Clavicles (pectoralis & deltoids), Calf (gastrocnemius), Thigh (quadriceps)  Fluid Accumulation:  No significant fluid accumulation     Strength:  Not Performed    Nutrition Assessment:    Admitted w/ PNA, PMHx of moderate COPD with chronic hypoxemic respiratory failure (on 3L NC qhs), GERD, underweight, anxiety and depression. Pt NPO on bipap at visit. She reports wt loss, states she weighed 74.4# at MD visit a few weeks ago, noted wt of 74# at office visit 2/20/24, 84# in July 2023 office visit. Pt denies significant changes in appetite, unclear etiology for wt loss. Denies trouble chewing/swallowing. Wt chronically low, has been in the 80's and 90's (pounds) since 2013 per chart review. NFPE performed, noted severe muscle and fat wasting, meets criteria for malnutrition. Pt does enjoy oral supplements, will send high calorie/high protein oral supp TID once on diet. Follow at high nutrition risk.    Nutrition Related Findings:    Meds, labs reviewed Wound Type:  (\"blanchable redness\" to coccyx)       Current Nutrition Intake & Therapies:    Average Meal Intake: NPO  Average 
Comprehensive Nutrition Assessment    Type and Reason for Visit:  Reassess    Nutrition Recommendations/Plan:   Continue current oral diet and oral nutrition supplements  Recommend patient continue high calorie/high protein oral nutrition supplement TID upon discharge  Monitor weights, po intakes, POC     Malnutrition Assessment:  Malnutrition Status:  Severe malnutrition (04/17/24 0831)    Context:  Chronic Illness       Nutrition Assessment:    Pt reports eating \"not a lot, but the usual amount for me\". Consuming >50% of her meals per flowsheets, >75% of supplement (provides 350kcal, 13g PRO each). Pt denies additional nutrition questions or concerns at this time. Follow at moderate nutrition risk.    Nutrition Related Findings:    Meds, labs reviewed Wound Type:  (\"blanchable redness\" to coccyx)       Current Nutrition Intake & Therapies:    Average Meal Intake: 51-75%, %  Average Supplements Intake: %  ADULT DIET; Regular  ADULT ORAL NUTRITION SUPPLEMENT; Breakfast, Lunch, Dinner; Standard High Calorie/High Protein Oral Supplement    Anthropometric Measures:  Height: 158.8 cm (5' 2.5\")  Ideal Body Weight (IBW): 113 lbs (51 kg)    Admission Body Weight: 39 kg (85 lb 15.7 oz)  Current Body Weight: 40 kg (88 lb 2.9 oz),   IBW. Weight Source: Bed Scale  Current BMI (kg/m2): 15.9  Usual Body Weight: 33.6 kg (74 lb 1.2 oz) (2/20/24 office visit)  % Weight Change (Calculated): 16.1  Weight Adjustment For: No Adjustment                 BMI Categories: Underweight (BMI less than 22) age over 65    Estimated Daily Nutrient Needs:  Energy Requirements Based On: Kcal/kg  Weight Used for Energy Requirements: Current  Energy (kcal/day): 5479-6633 (32-37kcal/kg)  Weight Used for Protein Requirements: Current  Protein (g/day): 50-60 (1.3-1.5g/kg)  Method Used for Fluid Requirements: 1 ml/kcal  Fluid (ml/day): 1400    Nutrition Diagnosis:   Severe malnutrition, In context of chronic illness related to impaired 
Dr Driscoll bedside. Gave VO to take pt off of bipap around 1045. Also to check VBG before removing. RN released PRN order for vbg and communicated with .   
LOVENOX PROPHYLAXIS EVALUATION  (Populations not addressed in this protocol: trauma, obstetrics, or COVID-19)    Wt Readings from Last 3 Encounters:   04/16/24 39 kg (85 lb 15.7 oz)   03/24/22 42.2 kg (93 lb)   01/20/22 40.4 kg (89 lb)       Scr = 0.58 and platelet count = 195 per [facility]    Weight Range: 50.9 kg and below    CRCL = 30 or greater    50.9 kg   and below     .9  kg   101-150.9 kg   151-174.9  kg   175 kg  or greater     30mg subq  daily     40mg subq daily    (or 30mg subq BID orthopedic)     30mg subq  BID   40mg subq  BID   60mg subq BID       Per P/T protocol for appropriate subq anticoagulation by weight and CRCL change to:    Enoxparin 30mg subq daily      Chris Ramsey [facility]  12:44 AM  04/17/24        
Occupational Therapy    Occupational Therapy Treatment Note    Name: Shannon Amaya MRN: [id number] :   1945   Date:  2024   Admission Date: 2024 Room:  -A     Primary Problem:  Community acquired pneumonia, unspecified laterality    Restrictions/Precautions:           fall risk     Communication with other providers: REJI Baxter approved session    Subjective:  Patient states:  \"It feels so good to get out of this bed!\"   Pain:   Location, Type, Intensity (0/10 to 10/10):  denies     Objective:    Observation: Pt presented supine in bed, daughter at bedside.    Objective Measures:  Tele, 4L o2 NC, brief desat to 89% while completing ADLs seated EOB and quickly recovered to 92%    Treatment, including education:  Therapeutic Activity Training:   Therapeutic activity training was instructed today.  Cues were given for safety, sequence, UE/LE placement, awareness, and balance.    Activities performed today included bed mobility training, sup-sit, sit-stand, SPT.    Supine to Sit with SBA with HOB partially raised.     Scooting hips forward with SBA.     Static sitting balance with SBA and progressed to dynamic sitting balance during ADLs with pt requiring CGA-Min A d/t frequent posterior LOB despite cues, pt demo difficulty maintain B LE on floor and overall balance.     Sit to Stand transfer from EOB with CGA for X2 trials and later from recliner for X2 trials with CGA and use of FWW with cues for safety with UE placement.     SPT from EOB <> recliner with Min A with use of FWW with cues for wt shifting forward and advancing FWW.     Stand to Sit with CGA.     Static standing balance for X2 trials with use of FWW with Min A for ~1 min and ~1.5 min.     Self Care Training:   Cues were given for safety, sequence, UE/LE placement, visual cues, and balance.    Activities performed today included:    Dressing:   Pt able to doff/don gown with Set-up A and required Min A for sitting balance d/t 
Occupational Therapy  [facility] ACUTE CARE OCCUPATIONAL THERAPY EVALUATION  Shannon Amaya, 1945, 2022/2022-DINA, 4/17/2024    Discharge Recommendation: Encourage facility for moderate post-acute rehabilitation, anticipate 1-2 hours per day and 5 days per week vs. Home with assist and HHC pending progress       History  [place]:  There were no encounter diagnoses.  Patient  has a past medical history of Former smoker, Hypertension, Moderate COPD (chronic obstructive pulmonary disease) (HCC), and Other emphysema (HCC).  Patient  has a past surgical history that includes Hysterectomy; Cholecystectomy; Abdomen surgery; other surgical history (07/16/2018); and Breast surgery.    Subjective:  Patient states:  \"My son also lives with me but he doesn't help out much\"   Pain:  denies pain.    Communication: RN, CM, co-eval with PT Jaelyn for safety and activity tolerance  Restrictions: fall risk    Home Setup/Prior level of function  Social/Functional History  Lives With: Daughter  Type of Home: House  Home Layout: One level  Home Access: Level entry  Bathroom Shower/Tub: Walk-in shower  Bathroom Equipment: Grab bars in shower, Shower chair  Home Equipment: Rollator, Oxygen (only uses rollator inside home, 3L at night)  Has the patient had two or more falls in the past year or any fall with injury in the past year?: Yes (4 falls in last week d/t unknow causes)  Receives Help From: Family  ADL Assistance: Independent  Homemaking Assistance: Needs assistance (DTR preforms)  Ambulation Assistance: Independent (using 4ww in home)  Transfer Assistance: Independent  Active : No    Examination of body systems (includes body structures/functions, activity/participation limitations):  Observation:  Supine in bed upon arrival, agreeable to therapy   Vision:  WFL  Hearing:  WFL  Cardiopulmonary:  On 3L, tele, O2 dropped into low 80's after preforming LE dressing tasks, pt instructed to take seated rest break and 
Patient already a patient of [facility] for nocturnal O2.  All paperwork has been signed and faxed to [facility] medical.  Please do NOT let patient leave without their portable O2 at bedside.  IF portable O2 is not at bedside upon discharge,please call [facility] at [phone].  Thank you.  
Pt vbg results sent to Dr Driscoll current settings 16/5. Dr Driscoll responded with changing settings to 20/8. RN communicated with RT. Pt has not voided. Pt bladder scanned. Withholding 435mls. Dr Driscoll made aware. Gave order for straight cath.   
the past year or any fall with injury in the past year?: Yes (4 falls in last week d/t unknow causes)  Receives Help From: Family  ADL Assistance: Independent  Homemaking Assistance: Needs assistance (DTR preforms)  Ambulation Assistance: Independent (using 4ww in home)  Transfer Assistance: Independent  Active : No        Short Term Goals  Time Frame for Short Term Goals: 1 week  Short Term Goal 1: pt to complete all bed mobility mod I  Short Term Goal 2: pt to complete all STS transfers to/from bed, commode, and chair SBA  Short Term Goal 3: pt to ambulate 50' with LRAD CGA    Electronically signed by:    Maurice Fontenot, PTA  4/21/2024, 10:12 AM

## 2024-04-21 NOTE — PLAN OF CARE
Problem: Discharge Planning  Goal: Discharge to home or other facility with appropriate resources  4/17/2024 2017 by Gregoria Lemus RN  Outcome: Progressing  4/17/2024 1033 by Leatha Wagner RN  Outcome: Progressing     Problem: Skin/Tissue Integrity  Goal: Absence of new skin breakdown  Description: 1.  Monitor for areas of redness and/or skin breakdown  2.  Assess vascular access sites hourly  3.  Every 4-6 hours minimum:  Change oxygen saturation probe site  4.  Every 4-6 hours:  If on nasal continuous positive airway pressure, respiratory therapy assess nares and determine need for appliance change or resting period.  4/17/2024 2018 by Gregoria Lemus RN  Outcome: Progressing  4/17/2024 1033 by Leatha Wagner RN  Outcome: Progressing     Problem: ABCDS Injury Assessment  Goal: Absence of physical injury  4/17/2024 1033 by Leatha Wagner RN  Outcome: Progressing     Problem: Safety - Adult  Goal: Free from fall injury  4/17/2024 1033 by Leatha Wagner RN  Outcome: Progressing     Problem: Nutrition Deficit:  Goal: Optimize nutritional status  4/17/2024 1033 by Leatha Wagner RN  Outcome: Progressing     Problem: Respiratory - Adult  Goal: Achieves optimal ventilation and oxygenation  Outcome: Progressing     
  Problem: Discharge Planning  Goal: Discharge to home or other facility with appropriate resources  4/18/2024 0730 by Vee Dias RN  Outcome: Progressing  4/17/2024 2017 by Gregoria Lemus RN  Outcome: Progressing     Problem: Skin/Tissue Integrity  Goal: Absence of new skin breakdown  Description: 1.  Monitor for areas of redness and/or skin breakdown  2.  Assess vascular access sites hourly  3.  Every 4-6 hours minimum:  Change oxygen saturation probe site  4.  Every 4-6 hours:  If on nasal continuous positive airway pressure, respiratory therapy assess nares and determine need for appliance change or resting period.  4/18/2024 0730 by Vee Dias RN  Outcome: Progressing  4/17/2024 2018 by Gregoria Lemus RN  Outcome: Progressing     Problem: ABCDS Injury Assessment  Goal: Absence of physical injury  Outcome: Progressing     Problem: Safety - Adult  Goal: Free from fall injury  Outcome: Progressing     Problem: Nutrition Deficit:  Goal: Optimize nutritional status  Outcome: Progressing     Problem: Respiratory - Adult  Goal: Achieves optimal ventilation and oxygenation  4/18/2024 0730 by Vee Dias RN  Outcome: Progressing  4/17/2024 2018 by Gregoria Lemus RN  Outcome: Progressing     Problem: Neurosensory - Adult  Goal: Achieves stable or improved neurological status  Outcome: Progressing  Goal: Achieves maximal functionality and self care  Outcome: Progressing     Problem: Cardiovascular - Adult  Goal: Maintains optimal cardiac output and hemodynamic stability  Outcome: Progressing  Goal: Absence of cardiac dysrhythmias or at baseline  Outcome: Progressing     Problem: Skin/Tissue Integrity - Adult  Goal: Skin integrity remains intact  Outcome: Progressing  Goal: Incisions, wounds, or drain sites healing without S/S of infection  Outcome: Progressing  Goal: Oral mucous membranes remain intact  Outcome: Progressing     Problem: Musculoskeletal - Adult  Goal: Return mobility to safest 
  Problem: Discharge Planning  Goal: Discharge to home or other facility with appropriate resources  4/19/2024 0749 by Vee Dias RN  Outcome: Progressing  4/18/2024 2039 by Gregoria Lemus RN  Outcome: Progressing  Flowsheets (Taken 4/18/2024 0737 by Vee Dias RN)  Discharge to home or other facility with appropriate resources:   Identify barriers to discharge with patient and caregiver   Arrange for needed discharge resources and transportation as appropriate     Problem: Skin/Tissue Integrity  Goal: Absence of new skin breakdown  Description: 1.  Monitor for areas of redness and/or skin breakdown  2.  Assess vascular access sites hourly  3.  Every 4-6 hours minimum:  Change oxygen saturation probe site  4.  Every 4-6 hours:  If on nasal continuous positive airway pressure, respiratory therapy assess nares and determine need for appliance change or resting period.  Outcome: Progressing     Problem: ABCDS Injury Assessment  Goal: Absence of physical injury  Outcome: Progressing     Problem: Safety - Adult  Goal: Free from fall injury  4/19/2024 0749 by Vee Dias RN  Outcome: Progressing  4/18/2024 2039 by Gregoria Lemus RN  Outcome: Progressing     Problem: Nutrition Deficit:  Goal: Optimize nutritional status  Outcome: Progressing     Problem: Respiratory - Adult  Goal: Achieves optimal ventilation and oxygenation  4/19/2024 0749 by Vee Dias RN  Outcome: Progressing  4/18/2024 2039 by Gregoria Lemus RN  Outcome: Progressing  Flowsheets (Taken 4/18/2024 0737 by Vee Dias RN)  Achieves optimal ventilation and oxygenation:   Assess for changes in respiratory status   Assess for changes in mentation and behavior   Position to facilitate oxygenation and minimize respiratory effort     Problem: Neurosensory - Adult  Goal: Achieves stable or improved neurological status  Outcome: Progressing  Goal: Achieves maximal functionality and self care  Outcome: Progressing     Problem: 
  Problem: Discharge Planning  Goal: Discharge to home or other facility with appropriate resources  4/19/2024 2003 by Gregoria Lemus RN  Outcome: Progressing     Problem: Safety - Adult  Goal: Free from fall injury  4/19/2024 2003 by Gregoria Lemus RN  Outcome: Progressing     Problem: Respiratory - Adult  Goal: Achieves optimal ventilation and oxygenation  4/19/2024 2003 by Gregoria Lemus, RN  Outcome: Progressing     
  Problem: Discharge Planning  Goal: Discharge to home or other facility with appropriate resources  4/20/2024 2019 by Gregoria Lemus RN  Outcome: Progressing  4/20/2024 0902 by Leatha Wagner RN  Outcome: Progressing     Problem: Skin/Tissue Integrity  Goal: Absence of new skin breakdown  Description: 1.  Monitor for areas of redness and/or skin breakdown  2.  Assess vascular access sites hourly  3.  Every 4-6 hours minimum:  Change oxygen saturation probe site  4.  Every 4-6 hours:  If on nasal continuous positive airway pressure, respiratory therapy assess nares and determine need for appliance change or resting period.  4/20/2024 0902 by Leatha Wagner RN  Outcome: Progressing     Problem: ABCDS Injury Assessment  Goal: Absence of physical injury  4/20/2024 0902 by Leatha Wagner RN  Outcome: Progressing     Problem: Safety - Adult  Goal: Free from fall injury  4/20/2024 2019 by Gregoria Lemus RN  Outcome: Progressing  4/20/2024 0902 by Leatha Wagner RN  Outcome: Progressing     Problem: Nutrition Deficit:  Goal: Optimize nutritional status  4/20/2024 0902 by Leatha Wagner RN  Outcome: Progressing     Problem: Respiratory - Adult  Goal: Achieves optimal ventilation and oxygenation  4/20/2024 2019 by Gregoria Lemus RN  Outcome: Progressing  4/20/2024 0902 by Leatha Wagner RN  Outcome: Progressing     Problem: Neurosensory - Adult  Goal: Achieves stable or improved neurological status  4/20/2024 0902 by Leatha Wagner RN  Outcome: Progressing  Goal: Achieves maximal functionality and self care  4/20/2024 0902 by Leatha Wagner RN  Outcome: Progressing     Problem: Cardiovascular - Adult  Goal: Maintains optimal cardiac output and hemodynamic stability  4/20/2024 0902 by Leatha Wagner RN  Outcome: Progressing  Goal: Absence of cardiac dysrhythmias or at baseline  4/20/2024 0902 by Leatha Wagner RN  Outcome: Progressing     Problem: Skin/Tissue Integrity - Adult  Goal: Skin 
  Problem: Discharge Planning  Goal: Discharge to home or other facility with appropriate resources  4/21/2024 1000 by Leatha Wagner RN  Outcome: Progressing  4/20/2024 2019 by Gregoria Lemus RN  Outcome: Progressing     Problem: Skin/Tissue Integrity  Goal: Absence of new skin breakdown  Description: 1.  Monitor for areas of redness and/or skin breakdown  2.  Assess vascular access sites hourly  3.  Every 4-6 hours minimum:  Change oxygen saturation probe site  4.  Every 4-6 hours:  If on nasal continuous positive airway pressure, respiratory therapy assess nares and determine need for appliance change or resting period.  Outcome: Progressing     Problem: ABCDS Injury Assessment  Goal: Absence of physical injury  Outcome: Progressing     Problem: Safety - Adult  Goal: Free from fall injury  4/21/2024 1000 by Leatha Wagner RN  Outcome: Progressing  4/20/2024 2019 by Gregoria Lemus RN  Outcome: Progressing     Problem: Nutrition Deficit:  Goal: Optimize nutritional status  Outcome: Progressing     Problem: Respiratory - Adult  Goal: Achieves optimal ventilation and oxygenation  4/21/2024 1000 by Leatha Wagner RN  Outcome: Progressing  4/20/2024 2019 by Gregoria Lemus RN  Outcome: Progressing     Problem: Neurosensory - Adult  Goal: Achieves stable or improved neurological status  Outcome: Progressing  Goal: Achieves maximal functionality and self care  Outcome: Progressing     Problem: Cardiovascular - Adult  Goal: Maintains optimal cardiac output and hemodynamic stability  Outcome: Progressing  Goal: Absence of cardiac dysrhythmias or at baseline  Outcome: Progressing     Problem: Skin/Tissue Integrity - Adult  Goal: Skin integrity remains intact  Outcome: Progressing  Goal: Incisions, wounds, or drain sites healing without S/S of infection  Outcome: Progressing  Goal: Oral mucous membranes remain intact  Outcome: Progressing     Problem: Musculoskeletal - Adult  Goal: Return mobility to safest 
  Problem: Discharge Planning  Goal: Discharge to home or other facility with appropriate resources  4/21/2024 1640 by Leatha Wagner RN  Outcome: Completed  4/21/2024 1000 by Leatha Wagner RN  Outcome: Progressing     Problem: Skin/Tissue Integrity  Goal: Absence of new skin breakdown  Description: 1.  Monitor for areas of redness and/or skin breakdown  2.  Assess vascular access sites hourly  3.  Every 4-6 hours minimum:  Change oxygen saturation probe site  4.  Every 4-6 hours:  If on nasal continuous positive airway pressure, respiratory therapy assess nares and determine need for appliance change or resting period.  4/21/2024 1640 by Leatha Wagner RN  Outcome: Completed  4/21/2024 1000 by Leatha Wagner RN  Outcome: Progressing     Problem: ABCDS Injury Assessment  Goal: Absence of physical injury  4/21/2024 1640 by Leatha Wagner RN  Outcome: Completed  4/21/2024 1000 by Leatha Wagner RN  Outcome: Progressing     Problem: Safety - Adult  Goal: Free from fall injury  4/21/2024 1640 by Leatha Wagner RN  Outcome: Completed  4/21/2024 1000 by Leatha Wagner RN  Outcome: Progressing     Problem: Nutrition Deficit:  Goal: Optimize nutritional status  4/21/2024 1640 by Leatha Wagner RN  Outcome: Completed  4/21/2024 1000 by Leatha Wagner RN  Outcome: Progressing     Problem: Respiratory - Adult  Goal: Achieves optimal ventilation and oxygenation  4/21/2024 1640 by Leatha Wagner RN  Outcome: Completed  4/21/2024 1000 by Leatha Wagner RN  Outcome: Progressing     Problem: Neurosensory - Adult  Goal: Achieves stable or improved neurological status  4/21/2024 1640 by Leatha Wagner RN  Outcome: Completed  4/21/2024 1000 by Leatha Wagner RN  Outcome: Progressing  Goal: Achieves maximal functionality and self care  4/21/2024 1640 by Leatha Wagner RN  Outcome: Completed  4/21/2024 1000 by Leatha Wagner RN  Outcome: Progressing     Problem: Cardiovascular - Adult  Goal: Maintains 
  Problem: Discharge Planning  Goal: Discharge to home or other facility with appropriate resources  Outcome: Progressing     Problem: Skin/Tissue Integrity  Goal: Absence of new skin breakdown  Description: 1.  Monitor for areas of redness and/or skin breakdown  2.  Assess vascular access sites hourly  3.  Every 4-6 hours minimum:  Change oxygen saturation probe site  4.  Every 4-6 hours:  If on nasal continuous positive airway pressure, respiratory therapy assess nares and determine need for appliance change or resting period.  Outcome: Progressing     Problem: ABCDS Injury Assessment  Goal: Absence of physical injury  Outcome: Progressing     Problem: Safety - Adult  Goal: Free from fall injury  Outcome: Progressing     Problem: Nutrition Deficit:  Goal: Optimize nutritional status  Outcome: Progressing     
adequate nutritional intake  Recent Flowsheet Documentation  Taken 4/18/2024 0737 by Vee Dias RN  Maintains adequate nutritional intake:   Monitor percentage of each meal consumed   Identify factors contributing to decreased intake, treat as appropriate  4/18/2024 0730 by Vee Dias RN  Outcome: Progressing     Problem: Genitourinary - Adult  Goal: Absence of urinary retention  4/18/2024 0730 by Vee Dias RN  Outcome: Progressing     Problem: Infection - Adult  Goal: Absence of infection at discharge  Recent Flowsheet Documentation  Taken 4/18/2024 0737 by Vee Dias RN  Absence of infection at discharge:   Monitor lab/diagnostic results   Assess and monitor for signs and symptoms of infection   Monitor all insertion sites i.e., indwelling lines, tubes and drains  4/18/2024 0730 by Vee Dias RN  Outcome: Progressing  Goal: Absence of infection during hospitalization  Recent Flowsheet Documentation  Taken 4/18/2024 0737 by Vee Dias RN  Absence of infection during hospitalization:   Assess and monitor for signs and symptoms of infection   Monitor lab/diagnostic results   Monitor all insertion sites i.e., indwelling lines, tubes and drains  4/18/2024 0730 by Vee Dias RN  Outcome: Progressing  Goal: Absence of fever/infection during anticipated neutropenic period  Recent Flowsheet Documentation  Taken 4/18/2024 0737 by Vee Dias RN  Absence of fever/infection during anticipated neutropenic period: Monitor white blood cell count  4/18/2024 0730 by Vee Dias RN  Outcome: Progressing     Problem: Metabolic/Fluid and Electrolytes - Adult  Goal: Electrolytes maintained within normal limits  Recent Flowsheet Documentation  Taken 4/18/2024 0737 by Vee Dias RN  Electrolytes maintained within normal limits:   Monitor labs and assess patient for signs and symptoms of electrolyte imbalances   Administer electrolyte replacement as ordered  4/18/2024 0730 by 
intake  4/19/2024 0749 by Vee Dias RN  Outcome: Progressing  Flowsheets (Taken 4/19/2024 0715)  Maintains adequate nutritional intake:   Identify factors contributing to decreased intake, treat as appropriate   Monitor percentage of each meal consumed   Monitor intake and output, weight and lab values     Problem: Genitourinary - Adult  Goal: Absence of urinary retention  4/19/2024 0749 by Vee Dias RN  Outcome: Progressing     Problem: Infection - Adult  Goal: Absence of infection at discharge  4/19/2024 0749 by Vee Dias RN  Outcome: Progressing  Flowsheets (Taken 4/19/2024 0715)  Absence of infection at discharge:   Assess and monitor for signs and symptoms of infection   Monitor lab/diagnostic results   Monitor all insertion sites i.e., indwelling lines, tubes and drains  Goal: Absence of infection during hospitalization  4/19/2024 0749 by Vee Dias RN  Outcome: Progressing  Flowsheets (Taken 4/19/2024 0715)  Absence of infection during hospitalization:   Assess and monitor for signs and symptoms of infection   Monitor lab/diagnostic results   Monitor all insertion sites i.e., indwelling lines, tubes and drains  Goal: Absence of fever/infection during anticipated neutropenic period  4/19/2024 0749 by Vee Dias RN  Outcome: Progressing  Flowsheets (Taken 4/19/2024 0715)  Absence of fever/infection during anticipated neutropenic period: Monitor white blood cell count     Problem: Metabolic/Fluid and Electrolytes - Adult  Goal: Electrolytes maintained within normal limits  4/19/2024 0749 by Vee Dias RN  Outcome: Progressing  Flowsheets (Taken 4/19/2024 0715)  Electrolytes maintained within normal limits:   Monitor labs and assess patient for signs and symptoms of electrolyte imbalances   Administer electrolyte replacement as ordered   Monitor response to electrolyte replacements, including repeat lab results as appropriate  Goal: Hemodynamic stability and optimal renal

## 2024-04-22 LAB
CULTURE: NORMAL
CULTURE: NORMAL
Lab: NORMAL
Lab: NORMAL
SPECIMEN: NORMAL
SPECIMEN: NORMAL

## 2024-06-02 ENCOUNTER — HOSPITAL ENCOUNTER (INPATIENT)
Age: 79
LOS: 2 days | Discharge: HOME HEALTH | DRG: 189 | End: 2024-06-04
Attending: INTERNAL MEDICINE | Admitting: INTERNAL MEDICINE
Payer: MEDICARE

## 2024-06-02 PROBLEM — J44.1 ACUTE EXACERBATION OF CHRONIC OBSTRUCTIVE PULMONARY DISEASE (COPD) (HCC): Status: ACTIVE | Noted: 2024-06-02

## 2024-06-02 PROCEDURE — 2060000000 HC ICU INTERMEDIATE R&B

## 2024-06-02 RX ORDER — SODIUM CHLORIDE 0.9 % (FLUSH) 0.9 %
5-40 SYRINGE (ML) INJECTION EVERY 12 HOURS SCHEDULED
Status: DISCONTINUED | OUTPATIENT
Start: 2024-06-02 | End: 2024-06-04 | Stop reason: HOSPADM

## 2024-06-02 RX ORDER — ONDANSETRON 4 MG/1
4 TABLET, ORALLY DISINTEGRATING ORAL EVERY 8 HOURS PRN
Status: DISCONTINUED | OUTPATIENT
Start: 2024-06-02 | End: 2024-06-04 | Stop reason: HOSPADM

## 2024-06-02 RX ORDER — MONTELUKAST SODIUM 10 MG/1
10 TABLET ORAL NIGHTLY
Status: DISCONTINUED | OUTPATIENT
Start: 2024-06-03 | End: 2024-06-04 | Stop reason: HOSPADM

## 2024-06-02 RX ORDER — ASPIRIN 81 MG/1
81 TABLET, CHEWABLE ORAL DAILY
Status: DISCONTINUED | OUTPATIENT
Start: 2024-06-03 | End: 2024-06-04 | Stop reason: HOSPADM

## 2024-06-02 RX ORDER — SODIUM CHLORIDE 9 MG/ML
INJECTION, SOLUTION INTRAVENOUS PRN
Status: DISCONTINUED | OUTPATIENT
Start: 2024-06-02 | End: 2024-06-04 | Stop reason: HOSPADM

## 2024-06-02 RX ORDER — UREA 10 %
10 LOTION (ML) TOPICAL NIGHTLY PRN
Status: DISCONTINUED | OUTPATIENT
Start: 2024-06-02 | End: 2024-06-04 | Stop reason: HOSPADM

## 2024-06-02 RX ORDER — PANTOPRAZOLE SODIUM 40 MG/1
40 TABLET, DELAYED RELEASE ORAL
Status: DISCONTINUED | OUTPATIENT
Start: 2024-06-03 | End: 2024-06-04 | Stop reason: HOSPADM

## 2024-06-02 RX ORDER — PREDNISONE 20 MG/1
40 TABLET ORAL DAILY
Status: DISCONTINUED | OUTPATIENT
Start: 2024-06-03 | End: 2024-06-04 | Stop reason: HOSPADM

## 2024-06-02 RX ORDER — SODIUM CHLORIDE 0.9 % (FLUSH) 0.9 %
5-40 SYRINGE (ML) INJECTION PRN
Status: DISCONTINUED | OUTPATIENT
Start: 2024-06-02 | End: 2024-06-04 | Stop reason: HOSPADM

## 2024-06-02 RX ORDER — ALBUTEROL SULFATE 90 UG/1
2 AEROSOL, METERED RESPIRATORY (INHALATION) EVERY 6 HOURS PRN
Status: DISCONTINUED | OUTPATIENT
Start: 2024-06-02 | End: 2024-06-04 | Stop reason: HOSPADM

## 2024-06-02 RX ORDER — ESOMEPRAZOLE MAGNESIUM 40 MG/1
20 GRANULE, DELAYED RELEASE ORAL DAILY
Status: DISCONTINUED | OUTPATIENT
Start: 2024-06-03 | End: 2024-06-02 | Stop reason: CLARIF

## 2024-06-02 RX ORDER — POLYETHYLENE GLYCOL 3350 17 G/17G
17 POWDER, FOR SOLUTION ORAL DAILY PRN
Status: DISCONTINUED | OUTPATIENT
Start: 2024-06-02 | End: 2024-06-04 | Stop reason: HOSPADM

## 2024-06-02 RX ORDER — ACETAMINOPHEN 650 MG/1
650 SUPPOSITORY RECTAL EVERY 6 HOURS PRN
Status: DISCONTINUED | OUTPATIENT
Start: 2024-06-02 | End: 2024-06-04 | Stop reason: HOSPADM

## 2024-06-02 RX ORDER — BUDESONIDE AND FORMOTEROL FUMARATE DIHYDRATE 160; 4.5 UG/1; UG/1
2 AEROSOL RESPIRATORY (INHALATION) 2 TIMES DAILY
Status: DISCONTINUED | OUTPATIENT
Start: 2024-06-03 | End: 2024-06-04 | Stop reason: HOSPADM

## 2024-06-02 RX ORDER — TRAZODONE HYDROCHLORIDE 50 MG/1
50 TABLET ORAL NIGHTLY PRN
Status: DISCONTINUED | OUTPATIENT
Start: 2024-06-02 | End: 2024-06-04 | Stop reason: HOSPADM

## 2024-06-02 RX ORDER — ACETAMINOPHEN 325 MG/1
650 TABLET ORAL EVERY 6 HOURS PRN
Status: DISCONTINUED | OUTPATIENT
Start: 2024-06-02 | End: 2024-06-04 | Stop reason: HOSPADM

## 2024-06-02 RX ORDER — GUAIFENESIN 600 MG/1
600 TABLET, EXTENDED RELEASE ORAL 2 TIMES DAILY
Status: DISCONTINUED | OUTPATIENT
Start: 2024-06-02 | End: 2024-06-04 | Stop reason: HOSPADM

## 2024-06-02 RX ORDER — TIZANIDINE 4 MG/1
4 TABLET ORAL NIGHTLY
Status: DISCONTINUED | OUTPATIENT
Start: 2024-06-03 | End: 2024-06-04 | Stop reason: HOSPADM

## 2024-06-02 RX ORDER — OXYBUTYNIN CHLORIDE 10 MG/1
10 TABLET, EXTENDED RELEASE ORAL DAILY
Status: DISCONTINUED | OUTPATIENT
Start: 2024-06-03 | End: 2024-06-04 | Stop reason: HOSPADM

## 2024-06-02 RX ORDER — CLONAZEPAM 1 MG/1
1 TABLET ORAL 2 TIMES DAILY PRN
Status: DISCONTINUED | OUTPATIENT
Start: 2024-06-02 | End: 2024-06-04 | Stop reason: HOSPADM

## 2024-06-02 RX ORDER — ONDANSETRON 2 MG/ML
4 INJECTION INTRAMUSCULAR; INTRAVENOUS EVERY 6 HOURS PRN
Status: DISCONTINUED | OUTPATIENT
Start: 2024-06-02 | End: 2024-06-04 | Stop reason: HOSPADM

## 2024-06-02 RX ORDER — ENOXAPARIN SODIUM 100 MG/ML
30 INJECTION SUBCUTANEOUS DAILY
Status: DISCONTINUED | OUTPATIENT
Start: 2024-06-03 | End: 2024-06-04 | Stop reason: HOSPADM

## 2024-06-02 RX ORDER — IPRATROPIUM BROMIDE AND ALBUTEROL SULFATE 2.5; .5 MG/3ML; MG/3ML
1 SOLUTION RESPIRATORY (INHALATION)
Status: DISCONTINUED | OUTPATIENT
Start: 2024-06-03 | End: 2024-06-04 | Stop reason: HOSPADM

## 2024-06-03 PROBLEM — E43 SEVERE MALNUTRITION (HCC): Chronic | Status: ACTIVE | Noted: 2024-06-03

## 2024-06-03 LAB
ANION GAP SERPL CALCULATED.3IONS-SCNC: 6 MMOL/L (ref 7–16)
BASE EXCESS MIXED: 15.4 (ref 0–3)
BASOPHILS ABSOLUTE: 0 K/CU MM
BASOPHILS RELATIVE PERCENT: 0.3 % (ref 0–1)
BUN SERPL-MCNC: 12 MG/DL (ref 6–23)
CALCIUM SERPL-MCNC: 8.3 MG/DL (ref 8.3–10.6)
CHLORIDE BLD-SCNC: 97 MMOL/L (ref 99–110)
CO2: 36 MMOL/L (ref 21–32)
COMMENT: ABNORMAL
CREAT SERPL-MCNC: 0.4 MG/DL (ref 0.6–1.1)
DIFFERENTIAL TYPE: ABNORMAL
EOSINOPHILS ABSOLUTE: 0 K/CU MM
EOSINOPHILS RELATIVE PERCENT: 0 % (ref 0–3)
GFR, ESTIMATED: >90 ML/MIN/1.73M2
GLUCOSE SERPL-MCNC: 141 MG/DL (ref 70–99)
HCO3 VENOUS: 46.3 MMOL/L (ref 22–29)
HCT VFR BLD CALC: 40 % (ref 37–47)
HEMOGLOBIN: 12.2 GM/DL (ref 12.5–16)
IMMATURE NEUTROPHIL %: 0.3 % (ref 0–0.43)
LYMPHOCYTES ABSOLUTE: 1 K/CU MM
LYMPHOCYTES RELATIVE PERCENT: 15.9 % (ref 24–44)
MCH RBC QN AUTO: 29 PG (ref 27–31)
MCHC RBC AUTO-ENTMCNC: 30.5 % (ref 32–36)
MCV RBC AUTO: 95 FL (ref 78–100)
MONOCYTES ABSOLUTE: 0.6 K/CU MM
MONOCYTES RELATIVE PERCENT: 10 % (ref 0–4)
NEUTROPHILS ABSOLUTE: 4.6 K/CU MM
NEUTROPHILS RELATIVE PERCENT: 73.5 % (ref 36–66)
NUCLEATED RBC %: 0 %
O2 SAT, VEN: 94.2 % (ref 50–70)
PCO2, VEN: 92 MMHG (ref 41–51)
PDW BLD-RTO: 15.4 % (ref 11.7–14.9)
PH VENOUS: 7.31 (ref 7.32–7.43)
PLATELET # BLD: 165 K/CU MM (ref 140–440)
PMV BLD AUTO: 10 FL (ref 7.5–11.1)
PO2, VEN: 86 MMHG (ref 28–48)
POTASSIUM SERPL-SCNC: 4.3 MMOL/L (ref 3.5–5.1)
RBC # BLD: 4.21 M/CU MM (ref 4.2–5.4)
SODIUM BLD-SCNC: 139 MMOL/L (ref 135–145)
TOTAL IMMATURE NEUTOROPHIL: 0.02 K/CU MM
TOTAL NUCLEATED RBC: 0 K/CU MM
WBC # BLD: 6.2 K/CU MM (ref 4–10.5)

## 2024-06-03 PROCEDURE — 6370000000 HC RX 637 (ALT 250 FOR IP): Performed by: INTERNAL MEDICINE

## 2024-06-03 PROCEDURE — 94761 N-INVAS EAR/PLS OXIMETRY MLT: CPT

## 2024-06-03 PROCEDURE — 94618 PULMONARY STRESS TESTING: CPT

## 2024-06-03 PROCEDURE — 94640 AIRWAY INHALATION TREATMENT: CPT

## 2024-06-03 PROCEDURE — 82805 BLOOD GASES W/O2 SATURATION: CPT

## 2024-06-03 PROCEDURE — 97162 PT EVAL MOD COMPLEX 30 MIN: CPT

## 2024-06-03 PROCEDURE — 2700000000 HC OXYGEN THERAPY PER DAY

## 2024-06-03 PROCEDURE — 97530 THERAPEUTIC ACTIVITIES: CPT

## 2024-06-03 PROCEDURE — 94664 DEMO&/EVAL PT USE INHALER: CPT

## 2024-06-03 PROCEDURE — 1200000000 HC SEMI PRIVATE

## 2024-06-03 PROCEDURE — 6360000002 HC RX W HCPCS: Performed by: INTERNAL MEDICINE

## 2024-06-03 PROCEDURE — 80048 BASIC METABOLIC PNL TOTAL CA: CPT

## 2024-06-03 PROCEDURE — 2580000003 HC RX 258: Performed by: INTERNAL MEDICINE

## 2024-06-03 PROCEDURE — 97116 GAIT TRAINING THERAPY: CPT

## 2024-06-03 PROCEDURE — 36415 COLL VENOUS BLD VENIPUNCTURE: CPT

## 2024-06-03 PROCEDURE — 94762 N-INVAS EAR/PLS OXIMTRY CONT: CPT

## 2024-06-03 PROCEDURE — 97166 OT EVAL MOD COMPLEX 45 MIN: CPT

## 2024-06-03 PROCEDURE — 85025 COMPLETE CBC W/AUTO DIFF WBC: CPT

## 2024-06-03 RX ADMIN — GUAIFENESIN 600 MG: 600 TABLET, EXTENDED RELEASE ORAL at 21:37

## 2024-06-03 RX ADMIN — ONDANSETRON 4 MG: 2 INJECTION INTRAMUSCULAR; INTRAVENOUS at 01:09

## 2024-06-03 RX ADMIN — BUDESONIDE AND FORMOTEROL FUMARATE DIHYDRATE 2 PUFF: 160; 4.5 AEROSOL RESPIRATORY (INHALATION) at 20:53

## 2024-06-03 RX ADMIN — IPRATROPIUM BROMIDE AND ALBUTEROL SULFATE 1 DOSE: 2.5; .5 SOLUTION RESPIRATORY (INHALATION) at 07:27

## 2024-06-03 RX ADMIN — IPRATROPIUM BROMIDE AND ALBUTEROL SULFATE 1 DOSE: 2.5; .5 SOLUTION RESPIRATORY (INHALATION) at 11:12

## 2024-06-03 RX ADMIN — BUDESONIDE AND FORMOTEROL FUMARATE DIHYDRATE 2 PUFF: 160; 4.5 AEROSOL RESPIRATORY (INHALATION) at 07:25

## 2024-06-03 RX ADMIN — AZITHROMYCIN MONOHYDRATE 500 MG: 500 INJECTION, POWDER, LYOPHILIZED, FOR SOLUTION INTRAVENOUS at 00:30

## 2024-06-03 RX ADMIN — ENOXAPARIN SODIUM 30 MG: 100 INJECTION SUBCUTANEOUS at 08:55

## 2024-06-03 RX ADMIN — IPRATROPIUM BROMIDE AND ALBUTEROL SULFATE 1 DOSE: 2.5; .5 SOLUTION RESPIRATORY (INHALATION) at 20:53

## 2024-06-03 RX ADMIN — OXYBUTYNIN CHLORIDE 10 MG: 10 TABLET, EXTENDED RELEASE ORAL at 08:55

## 2024-06-03 RX ADMIN — Medication 10 MG: at 00:30

## 2024-06-03 RX ADMIN — TIZANIDINE 4 MG: 4 TABLET ORAL at 21:37

## 2024-06-03 RX ADMIN — WATER 1000 MG: 1 INJECTION INTRAMUSCULAR; INTRAVENOUS; SUBCUTANEOUS at 08:54

## 2024-06-03 RX ADMIN — PREDNISONE 40 MG: 20 TABLET ORAL at 08:55

## 2024-06-03 RX ADMIN — GUAIFENESIN 600 MG: 600 TABLET, EXTENDED RELEASE ORAL at 08:55

## 2024-06-03 RX ADMIN — IPRATROPIUM BROMIDE AND ALBUTEROL SULFATE 1 DOSE: 2.5; .5 SOLUTION RESPIRATORY (INHALATION) at 15:09

## 2024-06-03 RX ADMIN — SODIUM CHLORIDE, PRESERVATIVE FREE 10 ML: 5 INJECTION INTRAVENOUS at 09:03

## 2024-06-03 RX ADMIN — ASPIRIN 81 MG: 81 TABLET, CHEWABLE ORAL at 08:55

## 2024-06-03 RX ADMIN — MONTELUKAST 10 MG: 10 TABLET, FILM COATED ORAL at 21:37

## 2024-06-03 RX ADMIN — SERTRALINE HYDROCHLORIDE 100 MG: 50 TABLET ORAL at 08:55

## 2024-06-03 RX ADMIN — CLONAZEPAM 1 MG: 1 TABLET ORAL at 00:30

## 2024-06-03 ASSESSMENT — ENCOUNTER SYMPTOMS
DIARRHEA: 0
CHEST TIGHTNESS: 0
CHOKING: 0
COUGH: 0
BLOOD IN STOOL: 0
NAUSEA: 0
STRIDOR: 0
ABDOMINAL PAIN: 0
VOMITING: 0
WHEEZING: 0
EYE PAIN: 0
CONSTIPATION: 0
BACK PAIN: 0
SHORTNESS OF BREATH: 0
ABDOMINAL DISTENTION: 0

## 2024-06-03 ASSESSMENT — PAIN SCALES - GENERAL: PAINLEVEL_OUTOF10: 3

## 2024-06-03 ASSESSMENT — PAIN DESCRIPTION - DESCRIPTORS: DESCRIPTORS: DISCOMFORT;TENDER

## 2024-06-03 ASSESSMENT — PAIN DESCRIPTION - LOCATION: LOCATION: GENERALIZED

## 2024-06-03 NOTE — ACP (ADVANCE CARE PLANNING)
Discussed with patient and her daughter(over [phone])  Regarding code status.  Opted DNR/DNI, no defibrillation

## 2024-06-03 NOTE — CONSULTS
[facility] ACUTE CARE PHYSICAL THERAPY EVALUATION  Shannon Amaya, 1945, 2028/2028-DINA, 6/3/2024    History  [place]:  There were no encounter diagnoses.  Patient  has a past medical history of Former smoker, Hypertension, Moderate COPD (chronic obstructive pulmonary disease) (HCC), and Other emphysema (HCC).  Patient  has a past surgical history that includes Hysterectomy; Cholecystectomy; Abdomen surgery; other surgical history (07/16/2018); and Breast surgery.    Recommendation: Encourage facility for intensive rehabilitation, anticipate 3 hours per day and 5 days per week.    Subjective:  Patient states:  \"So how did I do?\"   Pain:  denies   Communication with other providers:  Handoff to RN, co-eval with Nel WILLIAMSON   Restrictions: general precautions, falls     Home Setup/Prior level of function  Social/Functional History  Lives With: Son, Daughter  Type of Home: House  Home Layout: One level  Home Access: Level entry  Bathroom Shower/Tub: Tub/Shower unit (sponge bathes)  Bathroom Toilet: Standard  Home Equipment: Oxygen, Rollator (3L O2)  ADL Assistance: Independent  Homemaking Responsibilities: No  Ambulation Assistance: Independent ([place] with rollator)  Transfer Assistance: Independent  Additional Comments: Pt is moving in a month to a new home with 3 steps to get in, \"big\" walk in shower with a built in seat and grab bars    Examination of body systems (includes body structures/functions, activity/participation limitations):  Observation:  Supine in bed upon arrival. Cooperative with therapy. Dec safety awareness and insight.   Vision:  WFL   Hearing:  WFL   Cardiopulmonary:  SpO2 mid 90s on 3L at rest dropping to 79% with activity. Increased to 5L O2 with nursing present and pt increased  to 92%. Pt was able to be titrated back down to 3L at end of session.     Musculoskeletal  ROM R/L:  WFL BLES   Strength R/L:  BLEs grossly 4+/5 against MMT though buckling and weakness to LES with

## 2024-06-03 NOTE — CARE COORDINATION
CM reviewed pt’s medical record and discussed pt in IDR. CM introduced self and explained the role of case management. CM in to see pt to initiate D/C planning. Pt is cooperative to assessment. Pt is alert and oriented but unable to stay on one subject. CM discussed PT/OT process/recommendations. Pt is agreeable to [facility] at discharge. Pt choice of provider is 1). [facility]. 2). [facility]. 3). [facility]. Pt denies any other DC needs.      Plan for discharge is home with [facility]. Referral sent to [facility]. Denies having any further needs at this time. Please notify CM with any new DC needs.     The patient's individualized treatment goals were discussed and they are in agreement with the transitional plan of care. The patient was provided with a choice in provider and understands the freedom of choice list that was provided to them. The patient and/or family verbalize understanding of treatment preferences and quality data associated with providers.       06/03/24 4309   Service Assessment   Patient Orientation Alert and Oriented  (has trouble staying on subject)   Cognition Alert   History Provided By Patient;Medical Record   Primary Caregiver Family   Support Systems Children;Family Members   Patient's Healthcare Decision Maker is: Legal Next of Kin   PCP Verified by CM Yes   Last Visit to PCP Within last 3 months   Prior Functional Level Independent in ADLs/IADLs  (prior to episode that brought pt to Hospital)   Current Functional Level Assistance with the following:;Bathing;Toileting;Mobility   Can patient return to prior living arrangement Yes   Ability to make needs known: Good   Family able to assist with home care needs: Other (comment)  (limited due to working)   Would you like for me to discuss the discharge plan with any other family members/significant others, and if so, who? Yes  (daughter, Nahed)   Financial Resources Medicare   Community Resources None   Social/Functional History   Lives With

## 2024-06-03 NOTE — CONSULTS
[facility] ACUTE CARE OCCUPATIONAL THERAPY EVALUATION    Shannon Amaya, 1945, 2028/2028-DINA, 6/3/2024      Discharge Recommendation:  Patient has intensive post-acute occupational therapy service needs. Encourage facility for intensive rehabilitation, anticipate 3 hours per day and 5 days per week.    History:  [place]:  There were no encounter diagnoses.  Past Medical History:   Diagnosis Date    Former smoker 1/5/2018    Hypertension     Moderate COPD (chronic obstructive pulmonary disease) ([facility]) 1/5/2018    Other emphysema ([facility]) 1/5/2018         Subjective:  Patient states: \"I will do whatever you guys think is best\"  Pain: denied   Communication with other providers: RN, PT   Restrictions: general precautions, fall risk   No one at bedside    Home Setup/Prior level of function:  Social/Functional History  Lives With: Son, Daughter  Type of Home: House  Home Layout: One level  Home Access: Level entry  Bathroom Shower/Tub:  (sponge bathes)  Bathroom Toilet: Standard  Home Equipment: Oxygen, Rollator (3L)  ADL Assistance: Independent  Homemaking Responsibilities: No (pt helps out when she feels like she can)  Ambulation Assistance: Independent ([place] with rollator)  Transfer Assistance: Independent  Additional Comments: Pt is moving in a month to a new home with 3 steps to get in, \"big\" walk in shower with a built in seat and grab bars    Examination:  Observation: Pt was in bed upon arrival, agreeable to session  Vision: WFL  Hearing: WFL  Objective Measures: 79-91% on O2NC with RN present at end of ambulation. PT titrated to 5L until pt rebounded with RN present and PT titrated to return pt to 3L O2 at conclusion of session with pt sat at 91%. Pt asymptomatic throughout.     Body Systems and functions:  ROM: WFL in BUE  Strength: 3+/5 in BUE  Sensation: WFL  Tone: normotonic in BUE  Coordination: movements fluid and coordinated  Posture: normal posture  Activity Tolerance: Good     Activities of

## 2024-06-04 VITALS
HEIGHT: 62 IN | SYSTOLIC BLOOD PRESSURE: 145 MMHG | HEART RATE: 75 BPM | TEMPERATURE: 98.1 F | OXYGEN SATURATION: 97 % | DIASTOLIC BLOOD PRESSURE: 93 MMHG | RESPIRATION RATE: 15 BRPM | BODY MASS INDEX: 16.56 KG/M2 | WEIGHT: 90 LBS

## 2024-06-04 PROCEDURE — 94761 N-INVAS EAR/PLS OXIMETRY MLT: CPT

## 2024-06-04 PROCEDURE — 6360000002 HC RX W HCPCS: Performed by: INTERNAL MEDICINE

## 2024-06-04 PROCEDURE — 94640 AIRWAY INHALATION TREATMENT: CPT

## 2024-06-04 PROCEDURE — 97530 THERAPEUTIC ACTIVITIES: CPT

## 2024-06-04 PROCEDURE — 6370000000 HC RX 637 (ALT 250 FOR IP): Performed by: INTERNAL MEDICINE

## 2024-06-04 PROCEDURE — 2580000003 HC RX 258: Performed by: INTERNAL MEDICINE

## 2024-06-04 PROCEDURE — 2700000000 HC OXYGEN THERAPY PER DAY

## 2024-06-04 RX ORDER — PREDNISONE 20 MG/1
40 TABLET ORAL DAILY
Qty: 6 TABLET | Refills: 0 | Status: SHIPPED | OUTPATIENT
Start: 2024-06-05 | End: 2024-06-08

## 2024-06-04 RX ADMIN — PREDNISONE 40 MG: 20 TABLET ORAL at 08:50

## 2024-06-04 RX ADMIN — PANTOPRAZOLE SODIUM 40 MG: 40 TABLET, DELAYED RELEASE ORAL at 06:39

## 2024-06-04 RX ADMIN — CLONAZEPAM 1 MG: 1 TABLET ORAL at 00:56

## 2024-06-04 RX ADMIN — SODIUM CHLORIDE, PRESERVATIVE FREE 10 ML: 5 INJECTION INTRAVENOUS at 08:56

## 2024-06-04 RX ADMIN — OXYBUTYNIN CHLORIDE 10 MG: 10 TABLET, EXTENDED RELEASE ORAL at 08:50

## 2024-06-04 RX ADMIN — GUAIFENESIN 600 MG: 600 TABLET, EXTENDED RELEASE ORAL at 08:50

## 2024-06-04 RX ADMIN — AZITHROMYCIN MONOHYDRATE 500 MG: 500 INJECTION, POWDER, LYOPHILIZED, FOR SOLUTION INTRAVENOUS at 00:51

## 2024-06-04 RX ADMIN — ENOXAPARIN SODIUM 30 MG: 100 INJECTION SUBCUTANEOUS at 08:50

## 2024-06-04 RX ADMIN — BUDESONIDE AND FORMOTEROL FUMARATE DIHYDRATE 2 PUFF: 160; 4.5 AEROSOL RESPIRATORY (INHALATION) at 08:17

## 2024-06-04 RX ADMIN — Medication 10 MG: at 00:57

## 2024-06-04 RX ADMIN — ASPIRIN 81 MG: 81 TABLET, CHEWABLE ORAL at 08:50

## 2024-06-04 RX ADMIN — IPRATROPIUM BROMIDE AND ALBUTEROL SULFATE 1 DOSE: 2.5; .5 SOLUTION RESPIRATORY (INHALATION) at 08:15

## 2024-06-04 RX ADMIN — WATER 1000 MG: 1 INJECTION INTRAMUSCULAR; INTRAVENOUS; SUBCUTANEOUS at 08:51

## 2024-06-04 RX ADMIN — SERTRALINE HYDROCHLORIDE 100 MG: 50 TABLET ORAL at 08:50

## 2024-06-04 ASSESSMENT — PAIN SCALES - GENERAL: PAINLEVEL_OUTOF10: 0

## 2024-06-04 NOTE — PLAN OF CARE
Problem: Discharge Planning  Goal: Discharge to home or other facility with appropriate resources  6/3/2024 0830 by Vee Dias RN  Outcome: Progressing  6/2/2024 2229 by Irineo Macdonald RN  Outcome: Progressing     Problem: Safety - Adult  Goal: Free from fall injury  6/3/2024 0830 by Vee Dias RN  Outcome: Progressing  6/2/2024 2229 by Irineo Macdonald RN  Outcome: Progressing     Problem: Skin/Tissue Integrity  Goal: Absence of new skin breakdown  Description: 1.  Monitor for areas of redness and/or skin breakdown  2.  Assess vascular access sites hourly  3.  Every 4-6 hours minimum:  Change oxygen saturation probe site  4.  Every 4-6 hours:  If on nasal continuous positive airway pressure, respiratory therapy assess nares and determine need for appliance change or resting period.  6/3/2024 0830 by Vee Dias RN  Outcome: Progressing  6/2/2024 2229 by Irineo Macdonald RN  Outcome: Progressing     Problem: Neurosensory - Adult  Goal: Achieves stable or improved neurological status  Outcome: Progressing  Goal: Absence of seizures  Outcome: Progressing  Goal: Remains free of injury related to seizures activity  Outcome: Progressing  Goal: Achieves maximal functionality and self care  Outcome: Progressing     Problem: Respiratory - Adult  Goal: Achieves optimal ventilation and oxygenation  Outcome: Progressing     Problem: Cardiovascular - Adult  Goal: Maintains optimal cardiac output and hemodynamic stability  Outcome: Progressing  Goal: Absence of cardiac dysrhythmias or at baseline  Outcome: Progressing     Problem: Skin/Tissue Integrity - Adult  Goal: Skin integrity remains intact  Outcome: Progressing  Goal: Incisions, wounds, or drain sites healing without S/S of infection  Outcome: Progressing  Goal: Oral mucous membranes remain intact  Outcome: Progressing     Problem: Musculoskeletal - Adult  Goal: Return mobility to safest level of function  Outcome: Progressing  Goal: Return ADL 
  Problem: Discharge Planning  Goal: Discharge to home or other facility with appropriate resources  Outcome: Progressing     Problem: Safety - Adult  Goal: Free from fall injury  Outcome: Progressing     Problem: Neurosensory - Adult  Goal: Achieves stable or improved neurological status  Outcome: Progressing  Goal: Absence of seizures  Outcome: Progressing  Goal: Remains free of injury related to seizures activity  Outcome: Progressing  Goal: Achieves maximal functionality and self care  Outcome: Progressing     Problem: Respiratory - Adult  Goal: Achieves optimal ventilation and oxygenation  Outcome: Progressing     Problem: Cardiovascular - Adult  Goal: Maintains optimal cardiac output and hemodynamic stability  Outcome: Progressing     Problem: Musculoskeletal - Adult  Goal: Return mobility to safest level of function  Outcome: Progressing     Problem: Infection - Adult  Goal: Absence of infection at discharge  Outcome: Progressing  Goal: Absence of infection during hospitalization  Outcome: Progressing     
  Problem: Discharge Planning  Goal: Discharge to home or other facility with appropriate resources  Outcome: Progressing     Problem: Safety - Adult  Goal: Free from fall injury  Outcome: Progressing     Problem: Skin/Tissue Integrity  Goal: Absence of new skin breakdown  Description: 1.  Monitor for areas of redness and/or skin breakdown  2.  Assess vascular access sites hourly  3.  Every 4-6 hours minimum:  Change oxygen saturation probe site  4.  Every 4-6 hours:  If on nasal continuous positive airway pressure, respiratory therapy assess nares and determine need for appliance change or resting period.  Outcome: Progressing     
Conference as is appropriate  Outcome: Completed     Problem: Confusion  Goal: Confusion, delirium, dementia, or psychosis is improved or at baseline  Description: INTERVENTIONS:  1. Assess for possible contributors to thought disturbance, including medications, impaired vision or hearing, underlying metabolic abnormalities, dehydration, psychiatric diagnoses, and notify attending LIP  2. [place] high risk fall precautions, as indicated  3. Provide frequent short contacts to provide reality reorientation, refocusing and direction  4. Decrease environmental stimuli, including noise as appropriate  5. Monitor and intervene to maintain adequate nutrition, hydration, elimination, sleep and activity  6. If unable to ensure safety without constant attention obtain sitter and review sitter guidelines with assigned personnel  7. Initiate Psychosocial CNS and Spiritual Care consult, as indicated  Outcome: Completed     Problem: Behavior  Goal: Pt/Family maintain appropriate behavior and adhere to behavioral management agreement, if implemented  Description: INTERVENTIONS:  1. Assess patient/family's coping skills and  non-compliant behavior (including use of illegal substances)  2. Notify security of behavior or suspected illegal substances which indicate the need for search of the family and/or belongings  3. Encourage verbalization of thoughts and concerns in a socially appropriate manner  4. Utilize positive, consistent limit setting strategies supporting safety of patient, staff and others  5. Encourage participation in the decision making process about the behavioral management agreement  6. If a visitor's behavior poses a threat to safety call refer to organization policy.  7. Initiate consult with , Psychosocial CNS, Spiritual Care as appropriate  Outcome: Completed     Problem: Pain  Goal: Verbalizes/displays adequate comfort level or baseline comfort level  Outcome: Completed

## 2024-06-04 NOTE — DISCHARGE SUMMARY
Constitutional:       General: She is not in acute distress.     Appearance: She is not ill-appearing, toxic-appearing or diaphoretic.   HENT:      Head: Normocephalic and atraumatic.      Right Ear: External ear normal.      Left Ear: External ear normal.      Nose: Nose normal.      Mouth/Throat:      Pharynx: Oropharynx is clear. No oropharyngeal exudate or posterior oropharyngeal erythema.   Eyes:      General: No scleral icterus.        Right eye: No discharge.         Left eye: No discharge.      Conjunctiva/sclera: Conjunctivae normal.   Cardiovascular:      Rate and Rhythm: Normal rate and regular rhythm.      Pulses: Normal pulses.      Heart sounds: Normal heart sounds. No murmur heard.     No friction rub. No gallop.   Pulmonary:      Effort: Pulmonary effort is normal. No respiratory distress.      Breath sounds: Normal breath sounds. Decreased air movement and transmitted upper airway sounds present. No stridor. No decreased breath sounds, wheezing, rhonchi or rales.   Abdominal:      General: Bowel sounds are normal. There is no distension.      Palpations: Abdomen is soft. There is no mass.      Tenderness: There is no abdominal tenderness. There is no guarding or rebound.      Hernia: No hernia is present.   Musculoskeletal:      Right lower leg: No edema.      Left lower leg: No edema.   Skin:     General: Skin is warm.      Capillary Refill: Capillary refill takes less than 2 seconds.   Neurological:      Mental Status: She is alert and oriented to person, place, and time.      Motor: Weakness (4+/5 globally) present.   Psychiatric:         Mood and Affect: Mood normal.       Labs and Imaging   No results found.     CBC:   Recent Labs     06/03/24  0146   WBC 6.2   HGB 12.2*        BMP:    Recent Labs     06/03/24  0146      K 4.3   CL 97*   CO2 36*   BUN 12   CREATININE 0.4*   GLUCOSE 141*     Hepatic: No results for input(s): \"AST\", \"ALT\", \"BILITOT\", \"ALKPHOS\" in the last 72

## 2024-06-04 NOTE — CARE COORDINATION
Discharge order noted. Call to Francesca at [facility] and updated her that patient is on discharge today.

## 2024-06-04 NOTE — PROGRESS NOTES
6/3/2024 3:01 PM  Patient Room #: 2028/2028-A  Patient Name: Shannon Amaya   Testing for Home O2 with [employer]    (Step 1 Done by RN if possible otherwise call Pulmonary Diagnostics)  Place patient on room air at rest for at least 30 minutes.  If patient falls below 88% before 30 minutes then you can record the level and stop.  Record room air saturation level 94 %.  If patient is at 88% or below, they will qualify for home oxygen and you can stop.  If level does not fall below 88%, fill in level above. If indicated continue to Step 2.   Signature:_Marychuy Manzanares RRT__ Date: _06/04/2024__  (Step 2&3 Done by P)  Ambulate patient on room air until saturation falls below 89%.  Record level of room air saturation with ambulation___ %.  Next, place patient back on _3__lpm oxygen and ambulate, record level _90_%.  (Note:  this level must show improvement from room air level done with ambulation.)  If patient’s saturation on room air with ambulation is 88% or below AND patient shows improvement with oxygen during ambulation, they will qualify for home oxygen and you can stop.  If patient does not drop below 89%, then patient should have an overnight oximetry trending on room air to see if level falls below 88%.  Complete level in Step 3 below.    Room air overnight oximetry level 88 % for___  cumulative minutes.  If patient’s room air oxygen level is below <89% for any amount of time they will qualify for nocturnal home oxygen.        (Attach Night Trending Report)    Complete order below: Diagnosis:_COPD, Emphysema___  Home oxygen at:  Length of Need: ?X Lifetime ? 3 Months     _3__lpm or __%   via  [x] nasal cannula  []mask  [] other         [x]continuous [x]  with activity  [x]  Nocturnal   [x] Portable Tanks [x]  Concentrator  [x] Conserving Device        Therapist Signature:_Marychuy Manzanares, RRT_     Date:  06/04/2024___  Physician Signature:  __Electronically Signed in EMR_    Date:___  Physician 
4 Eyes Skin Assessment     NAME:  Shannon Amaya  YOB: 1945  MEDICAL RECORD NUMBER:  [id number]    The patient is being assessed for  Admission    I agree that at least one RN has performed a thorough Head to Toe Skin Assessment on the patient. ALL assessment sites listed below have been assessed.      Areas assessed by both nurses:    Head, Face, Ears, Shoulders, Back, Chest, Arms, Elbows, Hands, Sacrum. Buttock, Coccyx, Ischium, and Legs. Feet and Heels        Does the Patient have a Wound? No noted wound(s)       Rufus Prevention initiated by RN: Yes  Wound Care Orders initiated by RN: No    Pressure Injury (Stage 3,4, Unstageable, DTI, NWPT, and Complex wounds) if present, place Wound referral order by RN under : No    New Ostomies, if present place, Ostomy referral order under : No     Nurse 1 eSignature: Electronically signed by Irineo Macdonald RN on 6/2/24 at 10:13 PM EDT    **SHARE this note so that the co-signing nurse can place an eSignature**    Nurse 2 eSignature: Electronically signed by Carlos A Acosta RN on 6/2/24 at 10:15 PM EDT   
4 Eyes Skin Assessment     NAME:  Shannon Amaya  YOB: 1945  MEDICAL RECORD NUMBER:  [id number]    The patient is being assessed for  Admission    I agree that at least one RN has performed a thorough Head to Toe Skin Assessment on the patient. ALL assessment sites listed below have been assessed.      Areas assessed by both nurses:    Head, Face, Ears, Shoulders, Back, Chest, Arms, Elbows, Hands, Sacrum. Buttock, Coccyx, Ischium, Legs. Feet and Heels, Under Medical Devices , and Other no active wounds noticed. Scattered bruises on BUE        Does the Patient have a Wound? No noted wound(s)       Rufus Prevention initiated by RN: No  Wound Care Orders initiated by RN: No    Pressure Injury (Stage 3,4, Unstageable, DTI, NWPT, and Complex wounds) if present, place Wound referral order by RN under : No    New Ostomies, if present place, Ostomy referral order under : No     Nurse 1 eSignature: Electronically signed by Kenney Herzog RN on 6/4/24 at 3:14 AM EDT    **SHARE this note so that the co-signing nurse can place an eSignature**    Nurse 2 eSignature: Electronically signed by Kyrie Guzman LPN on 6/4/24 at 3:53 AM EDT   
Comprehensive Nutrition Assessment    Type and Reason for Visit:  Initial (Low BMI)    Nutrition Recommendations/Plan:   Begin standard high calorie oral nutrition supplement TID   Continue regular diet, add snacks TID   Encourage proper hydration/fluids intake at least 1200 ml/day   Please document all PO intakes in I/O   Consider grief counseling/community resources/support group s/p discharge      Malnutrition Assessment:  Malnutrition Status:  Severe malnutrition (06/03/24 1350)    Context:  Chronic Illness     Findings of the 6 clinical characteristics of malnutrition:  Energy Intake:  Mild decrease in energy intake (Comment)  Weight Loss:  No significant weight loss (chronic low weight)     Body Fat Loss:  Severe body fat loss Orbital, Triceps, Buccal region (per limited visual)   Muscle Mass Loss:  Severe muscle mass loss Clavicles (pectoralis & deltoids), Temples (temporalis), Thigh (quadriceps), Calf (gastrocnemius), Hand (interosseous) (per limited visual)  Fluid Accumulation:  No significant fluid accumulation Extremities   Strength:  Not Performed    Nutrition Assessment:    Admitted with [facility] COPD. Hx severe malnutrition dx 4/2024, GERD, Pulmonary cachexia, HLD, HTN. Currently on regular diet, reports consumes % of meals dos. Chronically low weight, states highest wt of 103# in life, ranges usually between 80-90# per hx. Pt denies issued with appetite pta; however, pt grieves over loss of multiple family members (sister passed 06/2023 whom she lived with and loved dearly, and niece passed 1/2024). Pt lives with children who aid with breakfast and dinner meals. However, pt endorses declining in health since moving in with children after sister passed away, finds it difficult to gain weight although drinking 2-350 kcal boost/ensure oral nutrition supplements daily and eating 3 meals/day. Has poor hydration, states drinks 2 small mini bottles, approx. only ~8 oz per day. Discussed tips for 
O2 eval completed. Results in soft chart.  
Outpatient Pharmacy Progress Note for Meds-to-Beds    Total number of Prescriptions Filled: 1    Additional Documentation:  Medication(s) were delivered to the patient's room prior to discharge      Thank you for letting us serve your patients.  [address] [address]    Phone: [phone]    Fax: [phone]       
Pt has oxygen with [employer], but needs checked to see if she needs more than 4 lpm. Pt not leaving today.  [employer] said the last order they had was for 3 lpm.   
Pt's Home Oxygen testing results [facility], per request from [facility].    
Transfer order and bed placement for patient to go to room 4126. Report called to Kenney. All questions answered. Patient and family informed of transfer. Patient transported via wheelchair by DILLAN Caceres       
reach, lines managed, needs met     Assessment / Impression:      Patient's tolerance of treatment: Good   Adverse Reaction: none   Significant change in status and impact:  none   Barriers to improvement: endurance, SOB     Plan for Next Session:    Continue OT POC    Time in:  1031  Time out:  1054  Timed treatment minutes:  23  Total treatment time:  23    Electronically signed by:    KIRILL Garner/L  License: [id number]  6/4/2024, 12:56 PM    
posterior oropharyngeal erythema.   Eyes:      General: No scleral icterus.        Right eye: No discharge.         Left eye: No discharge.      Conjunctiva/sclera: Conjunctivae normal.   Cardiovascular:      Rate and Rhythm: Normal rate and regular rhythm.      Pulses: Normal pulses.      Heart sounds: Normal heart sounds. No murmur heard.     No friction rub. No gallop.   Pulmonary:      Effort: Pulmonary effort is normal. No respiratory distress.      Breath sounds: Normal breath sounds. Decreased air movement and transmitted upper airway sounds present. No stridor. No decreased breath sounds, wheezing, rhonchi or rales.   Abdominal:      General: Bowel sounds are normal. There is no distension.      Palpations: Abdomen is soft. There is no mass.      Tenderness: There is no abdominal tenderness. There is no guarding or rebound.      Hernia: No hernia is present.   Musculoskeletal:      Right lower leg: No edema.      Left lower leg: No edema.   Skin:     General: Skin is warm.      Capillary Refill: Capillary refill takes less than 2 seconds.   Neurological:      Mental Status: She is alert and oriented to person, place, and time.      Motor: Weakness (4+/5 globally) present.   Psychiatric:         Mood and Affect: Mood normal.           Current Medications      cefTRIAXone (ROCEPHIN) IV  1,000 mg IntraVENous Q24H    sodium chloride flush  5-40 mL IntraVENous 2 times per day    enoxaparin  30 mg SubCUTAneous Daily    azithromycin  500 mg IntraVENous Q24H    ipratropium 0.5 mg-albuterol 2.5 mg  1 Dose Inhalation Q4H WA RT    guaiFENesin  600 mg Oral BID    predniSONE  40 mg Oral Daily    aspirin  81 mg Oral Daily    budesonide-formoterol  2 puff Inhalation BID    montelukast  10 mg Oral Nightly    oxyBUTYnin  10 mg Oral Daily    sertraline  100 mg Oral Daily    tiZANidine  4 mg Oral Nightly    pantoprazole  40 mg Oral QAM AC         Labs and Imaging Studies   Laboratory findings:  No results found.    Recent

## 2024-06-04 NOTE — FLOWSHEET NOTE
06/04/24 1030   Oxygen Therapy   SpO2 97 %   O2 Device Nasal cannula   O2 Flow Rate (L/min) 3 L/min

## 2025-03-07 NOTE — H&P
History and Physical      Name:  Shannon Amaya /Age/Sex: 1945  (79 y.o. female)   MRN & CSN:  [id number] & [id number] Encounter Date/Time: 2024 9:53 PM EDT   Location:  -A PCP: No primary care provider on file.       Hospital Day: 1    Assessment and Plan:     #.  Acute on chronic respiratory failure with hypoxia and hypercapnia  -VBG-pH 7.30, pCO2 86, pO2 67, HCO3 41.8.  -Chest x-ray-nonacute  -Patient was briefly on BiPAP  -Repeat blood gas-pH 7.32, pCO2 69, pO2 60, HCO3 35.  -As per information patient could not tolerate BiPAP  -Currently saturating 95% on 4 L of high flow nasal cannula.  -Monitor with repeat VBG.    #.  COPD exacerbation  -Patient received IV methylprednisone 80 mg, DuoNeb, albuterol neb in ED  -Continue p.o. prednisone, DuoNeb, azithromycin.    # Acute metabolic encephalopathy  -As per family at bedside patient was confused.  At home.  Currently patient is alert and oriented x 3.  No focal deficits.    #.  Repeated falls  -Patient usually ambulates with a walker  -Fall precautions  -PT/OT evaluation.    #.  Recent admission -2024 with similar complaints.  -Patient was treated for left lower lobe pneumonia.    #.  Anxiety disorder-on clonazepam-continue    #.  Insomnia-on trazodone    #.  Overactive bladder-on oxybutynin    #.  GERD-on esomeprazole    #.  Chronic tobacco dependence  -Admits to smoking< 0.5 PPD.    #.  COPD, chronic respiratory failure on home oxygen  Patient is on Singulair, Symbicort, DuoNeb    #.  History of partial small bowel obstruction-s/p laparoscopic adhesiolysis-2018    #.  Severe protein calorie malnutrition  #.  Failure to thrive with BMI 16.46    Disposition:   Current Living situation: Home with daughter and son-in-law    Diet ADULT DIET; Regular   DVT Prophylaxis [x] Lovenox   Code Status Limited   Surrogate Decision Maker/ POA Daughter     History from:   EMR, patient.    History of Present Illness:     Chief Complaint: Acute  Our Emergency Department Referral Coordinators will be reaching out to you in the next 24-48 hours from 9:00am to 5:00pm to schedule a follow up appointment. Please expect a phone call from the hospital in that time frame. If you do not receive a call or if you have any questions or concerns, you can reach them at   [phone].  Please follow-up with orthopedics for left knee dislocation follow-up.  -------------------------------------------------  DISCHARGE INSTRUCTIONS:  Medicines:  •NSAIDs: These medicines decrease swelling and pain. Take as directed. NSAIDs can cause stomach bleeding and kidney problems if not taken correctly.    •Rest: You will need to rest your foot for 1 to 2 days after your injury. This will help decrease the risk of more damage.  •Ice: Ice helps decrease swelling and pain. Ice may also help prevent tissue damage. Use an ice pack, or put crushed ice in a plastic bag. Cover it with a towel and place it on your foot for 15 to 20 minutes every hour or as directed.  •Compression: Compression (tight hold) provides support and helps decrease swelling and movement so your foot can heal. You may be told to keep your foot wrapped with a tight elastic bandage. Follow instructions about how to apply your bandage. Do not massage your foot. You could cause more damage or pain.  •Elevation: Keep your foot raised above the level of your heart while you are sitting or lying down. This will help decrease or limit swelling. Use pillows, blankets, or rolled towels to elevate your foot comfortably.